# Patient Record
Sex: MALE | Race: WHITE | NOT HISPANIC OR LATINO | Employment: OTHER | URBAN - METROPOLITAN AREA
[De-identification: names, ages, dates, MRNs, and addresses within clinical notes are randomized per-mention and may not be internally consistent; named-entity substitution may affect disease eponyms.]

---

## 2021-11-16 ENCOUNTER — TELEPHONE (OUTPATIENT)
Dept: UROLOGY | Facility: CLINIC | Age: 71
End: 2021-11-16

## 2021-12-01 ENCOUNTER — OFFICE VISIT (OUTPATIENT)
Dept: UROLOGY | Facility: CLINIC | Age: 71
End: 2021-12-01
Payer: COMMERCIAL

## 2021-12-01 VITALS — DIASTOLIC BLOOD PRESSURE: 82 MMHG | HEART RATE: 72 BPM | WEIGHT: 176 LBS | SYSTOLIC BLOOD PRESSURE: 140 MMHG

## 2021-12-01 DIAGNOSIS — R97.20 ELEVATED PSA: Primary | ICD-10-CM

## 2021-12-01 PROCEDURE — 99205 OFFICE O/P NEW HI 60 MIN: CPT | Performed by: UROLOGY

## 2021-12-01 PROCEDURE — G0416 PROSTATE BIOPSY, ANY MTHD: HCPCS | Performed by: PATHOLOGY

## 2021-12-01 PROCEDURE — 55700 PR BIOPSY OF PROSTATE,NEEDLE/PUNCH: CPT | Performed by: UROLOGY

## 2021-12-01 PROCEDURE — 96372 THER/PROPH/DIAG INJ SC/IM: CPT

## 2021-12-01 PROCEDURE — 76942 ECHO GUIDE FOR BIOPSY: CPT | Performed by: UROLOGY

## 2021-12-01 RX ORDER — OMEGA-3S/DHA/EPA/FISH OIL/D3 300MG-1000
400 CAPSULE ORAL DAILY
COMMUNITY

## 2021-12-01 RX ORDER — LISINOPRIL 20 MG/1
20 TABLET ORAL DAILY
COMMUNITY

## 2021-12-01 RX ORDER — CEFTRIAXONE 1 G/1
1000 INJECTION, POWDER, FOR SOLUTION INTRAMUSCULAR; INTRAVENOUS ONCE
Status: COMPLETED | OUTPATIENT
Start: 2021-12-01 | End: 2021-12-01

## 2021-12-01 RX ORDER — PROPRANOLOL/HYDROCHLOROTHIAZID 40 MG-25MG
TABLET ORAL
COMMUNITY

## 2021-12-01 RX ADMIN — CEFTRIAXONE 1000 MG: 1 INJECTION, POWDER, FOR SOLUTION INTRAMUSCULAR; INTRAVENOUS at 14:38

## 2021-12-02 ENCOUNTER — TELEPHONE (OUTPATIENT)
Dept: UROLOGY | Facility: AMBULATORY SURGERY CENTER | Age: 71
End: 2021-12-02

## 2021-12-07 ENCOUNTER — TELEPHONE (OUTPATIENT)
Dept: UROLOGY | Facility: AMBULATORY SURGERY CENTER | Age: 71
End: 2021-12-07

## 2021-12-17 ENCOUNTER — OFFICE VISIT (OUTPATIENT)
Dept: UROLOGY | Facility: CLINIC | Age: 71
End: 2021-12-17
Payer: COMMERCIAL

## 2021-12-17 VITALS
WEIGHT: 176 LBS | BODY MASS INDEX: 26.67 KG/M2 | HEIGHT: 68 IN | DIASTOLIC BLOOD PRESSURE: 74 MMHG | SYSTOLIC BLOOD PRESSURE: 138 MMHG

## 2021-12-17 DIAGNOSIS — C61 PROSTATE CANCER (HCC): Primary | ICD-10-CM

## 2021-12-17 PROCEDURE — 99214 OFFICE O/P EST MOD 30 MIN: CPT | Performed by: UROLOGY

## 2021-12-17 RX ORDER — VITAMIN B COMPLEX
1 CAPSULE ORAL DAILY
COMMUNITY

## 2022-01-12 ENCOUNTER — APPOINTMENT (OUTPATIENT)
Dept: LAB | Facility: CLINIC | Age: 72
End: 2022-01-12
Payer: COMMERCIAL

## 2022-01-12 DIAGNOSIS — C61 PROSTATE CANCER (HCC): ICD-10-CM

## 2022-01-12 LAB
ANION GAP SERPL CALCULATED.3IONS-SCNC: 5 MMOL/L (ref 4–13)
BUN SERPL-MCNC: 20 MG/DL (ref 5–25)
CALCIUM SERPL-MCNC: 10.2 MG/DL (ref 8.3–10.1)
CHLORIDE SERPL-SCNC: 108 MMOL/L (ref 100–108)
CO2 SERPL-SCNC: 28 MMOL/L (ref 21–32)
CREAT SERPL-MCNC: 1.19 MG/DL (ref 0.6–1.3)
GFR SERPL CREATININE-BSD FRML MDRD: 60 ML/MIN/1.73SQ M
GLUCOSE P FAST SERPL-MCNC: 82 MG/DL (ref 65–99)
POTASSIUM SERPL-SCNC: 4.2 MMOL/L (ref 3.5–5.3)
SODIUM SERPL-SCNC: 141 MMOL/L (ref 136–145)

## 2022-01-12 PROCEDURE — 36415 COLL VENOUS BLD VENIPUNCTURE: CPT

## 2022-01-12 PROCEDURE — 80048 BASIC METABOLIC PNL TOTAL CA: CPT

## 2022-01-19 ENCOUNTER — HOSPITAL ENCOUNTER (OUTPATIENT)
Dept: NUCLEAR MEDICINE | Facility: HOSPITAL | Age: 72
Discharge: HOME | End: 2022-01-19
Attending: UROLOGY
Payer: COMMERCIAL

## 2022-01-19 ENCOUNTER — HOSPITAL ENCOUNTER (OUTPATIENT)
Dept: CT IMAGING | Facility: HOSPITAL | Age: 72
Discharge: HOME | End: 2022-01-19
Attending: UROLOGY
Payer: COMMERCIAL

## 2022-01-19 DIAGNOSIS — C61 PROSTATE CANCER (HCC): ICD-10-CM

## 2022-01-19 PROCEDURE — 74177 CT ABD & PELVIS W/CONTRAST: CPT

## 2022-01-19 PROCEDURE — 78306 BONE IMAGING WHOLE BODY: CPT

## 2022-01-19 PROCEDURE — A9503 TC99M MEDRONATE: HCPCS

## 2022-01-19 PROCEDURE — G1004 CDSM NDSC: HCPCS

## 2022-01-19 RX ADMIN — IOHEXOL 100 ML: 350 INJECTION, SOLUTION INTRAVENOUS at 11:25

## 2022-01-21 PROBLEM — C61 PROSTATE CANCER (HCC): Status: ACTIVE | Noted: 2022-01-21

## 2022-01-25 ENCOUNTER — RADIATION ONCOLOGY CONSULT (OUTPATIENT)
Dept: RADIATION ONCOLOGY | Facility: CLINIC | Age: 72
End: 2022-01-25
Attending: INTERNAL MEDICINE
Payer: COMMERCIAL

## 2022-01-25 VITALS
HEIGHT: 68 IN | DIASTOLIC BLOOD PRESSURE: 90 MMHG | WEIGHT: 178.57 LBS | RESPIRATION RATE: 20 BRPM | TEMPERATURE: 97.3 F | SYSTOLIC BLOOD PRESSURE: 146 MMHG | OXYGEN SATURATION: 98 % | BODY MASS INDEX: 27.06 KG/M2 | HEART RATE: 84 BPM

## 2022-01-25 DIAGNOSIS — C61 PROSTATE CANCER (HCC): Primary | ICD-10-CM

## 2022-01-25 PROCEDURE — 99244 OFF/OP CNSLTJ NEW/EST MOD 40: CPT | Performed by: INTERNAL MEDICINE

## 2022-01-25 PROCEDURE — 99211 OFF/OP EST MAY X REQ PHY/QHP: CPT | Performed by: INTERNAL MEDICINE

## 2022-01-25 NOTE — PROGRESS NOTES
Consultation - Radiation Oncology      Patient Name: Keo Low [id number]:[id number] : 1950  Encounter: [id number]  Referring Provider: Beth Cockayne,*    [address] [place] [address]  Chief Complaint   Patient presents with    Consult     Rad Onc      Cancer Staging  Prostate cancer [facility])  Staging form: Prostate, AJCC 8th Edition  - Clinical stage from 2021: Stage IIIC (cT2a, cN0, cM0, PSA: 45 5, Grade Group: 5) - Signed by Radha Pineda MD on 2022  Prostate specific antigen (PSA) range: 20 or greater  Sandhya primary pattern: 4  [place] secondary pattern: 5  Histologic grading system: 5 grade system    History of Present Illness  Keo Low 1950 is a 66 y o  male  presents today to discuss radiation therapy for recently diagnosed Sandhya 9 (4+5) prostate cancer  Referred by Dr Brent Murcia  Past history of HTN, HLD, OA  No family history of prostate cancer      66year old male followed by the [address] [place] [address], presented to urology for elevated PSA and abnormal digital rectal exam      PSA Trend (done at [address] [place] [address])  2012 PSA 2 85  2013 PSA 4 49  2013 PSA 4 79  2014 PSA 6 10  2015 PSA 7 27  3/10/2016 PSA 6 94  10/21/2021 PSA 45 46      21 Urology consult, Dr Brent Murcia  Digital rectal examination reveals a 35 g prostate which is quite firm on the right side and suspicious for prostate cancer  Transrectal ultrasound-guided biopsy of the prostate recommended     21 Transrectal ultrasound-guided biopsy of the prostate  A  Prostate, right lateral base:  - Prostatic adenocarcinoma, [place] score 7, 4+3, Prognostic Grade Group 3, continuously involving 10% of 1 of 1 core  - Periprostatic fat invasion:Not identified  - Lymph-vascular invasion:  not identified  - Perineural invasion: identified  - Additional Pathologic Findings: none identified     B   Prostate, right lateral mid:  - Prostatic adenocarcinoma, Sandhya score 9, 4+5, Prognostic Grade Group 5, continuously involving 100% of 1 of 1 core   - Percentage pattern 4: 70%  - Percentage pattern 5: 5%  - Periprostatic fat invasion:Not identified  - Lymph-vascular invasion:  not identified  - Perineural invasion:Not identified  - Additional Pathologic Findings:  high-grade prostatic intraepithelial neoplasia     C: Prostate, right lateral apex:  - Prostatic adenocarcinoma, [place] score 9, 4+5, Prognostic Grade Group 5, continuously involving 100% of 1 of 1 core  - Percentage pattern 4: 60%  - Percentage pattern 5: 10%  - Periprostatic fat invasion:Not identified  - Lymph-vascular invasion:  not identified  - Perineural invasion: identified  - Additional Pathologic Findings:  high-grade prostatic intraepithelial neoplasia      D  Prostate, right base:  - Prostatic adenocarcinoma, Sandhya score 7, 4+3, Prognostic Grade Group 3, continuously involving 10% of 1 of 2 cores  - Periprostatic fat invasion:Not identified  - Lymph-vascular invasion:  not identified  - Perineural invasion: identified  - Additional Pathologic Findings: high-grade prostatic intraepithelial neoplasia      E  Prostate, right mid:  - Prostatic adenocarcinoma, [place] score 9, 4+5, Prognostic Grade Group 5, continuously involving 100% of 1 of 1 core  - Percentage pattern 4: 60%  - Percentage pattern 5: 10%  - Periprostatic fat invasion:Not identified  - Lymph-vascular invasion:  not identified  - Perineural invasion: identified  - Additional Pathologic Findings:  high-grade prostatic intraepithelial neoplasia       F  Prostate, right apex:  - Prostatic adenocarcinoma, Sandhya score 9, 4+5, Prognostic Grade Group 5, continuously involving 100% of 1 of 1 core  - Percentage pattern 4: 80%  - Percentage pattern 5: 10%  - Periprostatic fat invasion:Not identified  - Lymph-vascular invasion:  not identified  - Perineural invasion: identified  - Additional Pathologic Findings:  high-grade prostatic intraepithelial neoplasia       G   Prostate, left lateral base:  - Prostatic adenocarcinoma, [place]hy[place] score 8, 4+4, Prognostic Grade Group 4, continuously involving 70% of 1 of 1 core  - Periprostatic fat invasion:Not identified  - Lymph-vascular invasion:  not identified  - Perineural invasion:Not identified  - Additional Pathologic Findings:  high-grade prostatic intraepithelial neoplasia       H  Prostate, left lateral mid:  - Prostatic adenocarcinoma, Sandhya score 8, 4+4, Prognostic Grade Group 4, continuously involving 70% of 1 of 1 core  - Periprostatic fat invasion:Not identified  - Lymph-vascular invasion:  not identified  - Perineural invasion:Not identified  - Additional Pathologic Findings:  high-grade prostatic intraepithelial neoplasia        I  Prostate, left lateral apex:  - Prostatic adenocarcinoma, [place] score 8, 4+4, Prognostic Grade Group 4, continuously involving 100% of 1 of 1 core  - Periprostatic fat invasion:Not identified  - Lymph-vascular invasion:  not identified  - Perineural invasion:Not identified  - Additional Pathologic Findings:  high-grade prostatic intraepithelial neoplasia       J  Prostate, left base:  - Prostatic adenocarcinoma, Sandhya score 8, 4+4, Prognostic Grade Group 4, continuously involving 60% of 1 of 1 core  - Periprostatic fat invasion:Not identified  - Lymph-vascular invasion:  not identified  - Perineural invasion:Not identified  - Additional Pathologic Findings:  high-grade prostatic intraepithelial neoplasia       K  Prostate, left mid:  - Prostatic adenocarcinoma, [place] score 8, 4+4, Prognostic Grade Group 4, continuously involving 30% of 1 of 1 core  - Periprostatic fat invasion:Not identified  - Lymph-vascular invasion:  not identified  - Perineural invasion:Not identified  - Additional Pathologic Findings: None identified       L  Prostate, left apex:  - Prostatic adenocarcinoma, Sandhya score 9, 4+5, Prognostic Grade Group 5, continuously involving 50% of 1 of 1 core    - Percentage pattern 4: 70%  - Percentage pattern 5: 10%  - Periprostatic fat invasion:Not identified  - Lymph-vascular invasion:  not identified  - Perineural invasion: identified  - Additional Pathologic Findings:  high-grade prostatic intraepithelial neoplasia         21 Urology, Dr Soni Crowe  Multiple cores revealed [place] 4 + 5 disease with high core positivity  CT scan and bone scan for staging  Refer to radiation oncology pending his staging studies     He will return in follow-up after staging to continue our discussion regarding treatment options      22 CT abdomen pelvis  IMPRESSION:   1   Indeterminate hypodense lesion in the left lobe of the liver   This is not a typical appearance for metastasis but a hepatic neoplasm is not excluded   Recommend follow-up with dedicated MRI of the liver with and without IV contrast   2   5 mm left lower lobe pulmonary nodule   Typical follow up in a patient with history of malignancy is follow up chest CT in 3 months      22 Bone Scan  IMPRESSION:   1   No scintigraphic evidence of osseous metastasis     Upcomin/4/22 Dr Soni Crowe    He reports feeling well overall  He presents with his wife who used to work in healthcare previously  He notes some mild urinary frequency and very mild urination  He has nocturia 1x per night  IPSS Questionnaire (AUA-7): Over the past month     1)  How often have you had a sensation of not emptying your bladder completely after you finish urinating? 0 - Not at all   2)  How often have you had to urinate again less than two hours after you finished urinating? 1 - Less than 1 time in 5   3)  How often have you found you stopped and started again several times when you urinated? 0 - Not at all   4) How difficult have you found it to postpone urination? 1 - Less than 1 time in 5   5) How often have you had a weak urinary stream?  0 - Not at all   6) How often have you had to push or strain to begin urination?   0 - Not at all   7) How many times did you most typically get up to urinate from the time you went to bed until the time you got up in the morning? 1 - 1 time   Total Score:  3     Oncology History   Prostate cancer ([facility] [address] )   12/1/2021 Initial Diagnosis    [facility]     12/1/2021 Biopsy    A  Prostate, right lateral base:  - Prostatic adenocarcinoma, [place]hy[place] score 7, 4+3, Prognostic Grade Group 3, continuously involving 10% of 1 of 1 core  - Periprostatic fat invasion:Not identified  - Lymph-vascular invasion:  not identified  - Perineural invasion: identified  - Additional Pathologic Findings: none identified     B  Prostate, right lateral mid:  - Prostatic adenocarcinoma, [place]hy[place] score 9, 4+5, Prognostic Grade Group 5, continuously involving 100% of 1 of 1 core  - Percentage pattern 4: 70%  - Percentage pattern 5: 5%  - Periprostatic fat invasion:Not identified  - Lymph-vascular invasion:  not identified  - Perineural invasion:Not identified  - Additional Pathologic Findings:  high-grade prostatic intraepithelial neoplasia     C: Prostate, right lateral apex:  - Prostatic adenocarcinoma, [place]hy[place] score 9, 4+5, Prognostic Grade Group 5, continuously involving 100% of 1 of 1 core  - Percentage pattern 4: 60%  - Percentage pattern 5: 10%  - Periprostatic fat invasion:Not identified  - Lymph-vascular invasion:  not identified  - Perineural invasion: identified  - Additional Pathologic Findings:  high-grade prostatic intraepithelial neoplasia      D  Prostate, right base:  - Prostatic adenocarcinoma, [place] score 7, 4+3, Prognostic Grade Group 3, continuously involving 10% of 1 of 2 cores  - Periprostatic fat invasion:Not identified  - Lymph-vascular invasion:  not identified  - Perineural invasion: identified  - Additional Pathologic Findings: high-grade prostatic intraepithelial neoplasia      E  Prostate, right mid:  - Prostatic adenocarcinoma, [place] score 9, 4+5, Prognostic Grade Group 5, continuously involving 100% of 1 of 1 core    - Percentage pattern 4: 60%  - Percentage pattern 5: 10%  - Periprostatic fat invasion:Not identified  - Lymph-vascular invasion:  not identified  - Perineural invasion: identified  - Additional Pathologic Findings:  high-grade prostatic intraepithelial neoplasia       F  Prostate, right apex:  - Prostatic adenocarcinoma, [place] score 9, 4+5, Prognostic Grade Group 5, continuously involving 100% of 1 of 1 core  - Percentage pattern 4: 80%  - Percentage pattern 5: 10%  - Periprostatic fat invasion:Not identified  - Lymph-vascular invasion:  not identified  - Perineural invasion: identified  - Additional Pathologic Findings:  high-grade prostatic intraepithelial neoplasia       G  Prostate, left lateral base:  - Prostatic adenocarcinoma, [place] score 8, 4+4, Prognostic Grade Group 4, continuously involving 70% of 1 of 1 core  - Periprostatic fat invasion:Not identified  - Lymph-vascular invasion:  not identified  - Perineural invasion:Not identified  - Additional Pathologic Findings:  high-grade prostatic intraepithelial neoplasia       H  Prostate, left lateral mid:  - Prostatic adenocarcinoma, Sandhy[place] score 8, 4+4, Prognostic Grade Group 4, continuously involving 70% of 1 of 1 core  - Periprostatic fat invasion:Not identified  - Lymph-vascular invasion:  not identified  - Perineural invasion:Not identified  - Additional Pathologic Findings:  high-grade prostatic intraepithelial neoplasia        I  Prostate, left lateral apex:  - Prostatic adenocarcinoma, [place] score 8, 4+4, Prognostic Grade Group 4, continuously involving 100% of 1 of 1 core  - Periprostatic fat invasion:Not identified  - Lymph-vascular invasion:  not identified  - Perineural invasion:Not identified  - Additional Pathologic Findings:  high-grade prostatic intraepithelial neoplasia       J  Prostate, left base:  - Prostatic adenocarcinoma, [place]hy[place] score 8, 4+4, Prognostic Grade Group 4, continuously involving 60% of 1 of 1 core    - Periprostatic fat invasion:Not identified  - Lymph-vascular invasion:  not identified  - Perineural invasion:Not identified  - Additional Pathologic Findings:  high-grade prostatic intraepithelial neoplasia       K  Prostate, left mid:  - Prostatic adenocarcinoma, [place] score 8, 4+4, Prognostic Grade Group 4, continuously involving 30% of 1 of 1 core  - Periprostatic fat invasion:Not identified  - Lymph-vascular invasion:  not identified  - Perineural invasion:Not identified  - Additional Pathologic Findings: None identified       L  Prostate, left apex:  - Prostatic adenocarcinoma, [place]hy[place] score 9, 4+5, Prognostic Grade Group 5, continuously involving 50% of 1 of 1 core  - Percentage pattern 4: 70%  - Percentage pattern 5: 10%  - Periprostatic fat invasion:Not identified  - Lymph-vascular invasion:  not identified  - Perineural invasion: identified  - Additional Pathologic Findings:  high-grade prostatic intraepithelial neoplasia        12/1/2021 -  Cancer Staged    Staging form: Prostate, AJCC 8th Edition  - Clinical stage from 12/1/2021: Stage IIIC (cT2a, cN0, cM0, PSA: 45 5, Grade Group: 5) - Signed by Bety Cabrera MD on 1/25/2022  Prostate specific antigen (PSA) range: 20 or greater  [place] primary pattern: 4  Sandhya secondary pattern: 5  Histologic grading system: 5 grade system         Historical Information   Past Medical History:   Diagnosis Date    Hypertension     Prostate cancer [facility])      Past Surgical History:   Procedure Laterality Date    PROSTATE BIOPSY       History reviewed  No pertinent family history  Social History   Social History     Substance and Sexual Activity   Alcohol Use Yes    Comment: Beer and liquor daily-average 2-3 drinks/day        Social History     Substance and Sexual Activity   Drug Use Yes    Types: Marijuana    Comment: Nightly use--since age 25 yo      Social History     Tobacco Use   Smoking Status Former Smoker   Smokeless Tobacco Never Used   Tobacco Comment    Quit smoking approx 7-8 yrs ago/Smoked 1/2 pk daily w/smoking      Meds/Allergies     Current Outpatient Medications:     b complex vitamins capsule, Take 1 capsule by mouth daily, Disp: , Rfl:     cholecalciferol (VITAMIN D3) 400 units tablet, Take 400 Units by mouth daily, Disp: , Rfl:     COD LIVER OIL PO, Take by mouth, Disp: , Rfl:     lisinopril (ZESTRIL) 20 mg tablet, Take 20 mg by mouth daily, Disp: , Rfl:     Turmeric (QC Tumeric Complex) 500 MG CAPS, Take by mouth, Disp: , Rfl:   No Known Allergies    Lab Results/Imaging Studies       Chemistry        Component Value Date/Time    K 4 2 01/12/2022 1115     01/12/2022 1115    CO2 28 01/12/2022 1115    BUN 20 01/12/2022 1115    CREATININE 1 19 01/12/2022 1115        Component Value Date/Time    CALCIUM 10 2 (H) 01/12/2022 1115          No results found for: WBC, HGB, HCT, MCV, PLT  Imaging Studies  NM bone scan whole body    Result Date: 1/19/2022  Narrative: BONE SCAN  WHOLE BODY INDICATION: C61: Malignant neoplasm of prostate PREVIOUS FILM CORRELATION:    CT abdomen pelvis 1/19/2022 TECHNIQUE:   This study was performed following the intravenous administration of 26 7 mCi Tc-99m labeled MDP  Delayed, anterior and posterior whole body images were acquired, 2-3 hours after radiopharmaceutical administration  Additional delayed oblique static images acquired of the bilateral ribs and pelvis  FINDINGS:  Mild focal radiotracer uptake at the left maxilla inferiorly may be related to dental disease  Foci of radiotracer uptake at the bilateral shoulders, manubrioclavicular joints, hips (more prominently on the right) and left knee likely related to arthritic changes given the distribution  There is no scintigraphic evidence of osseous metastasis  The renal activity is symmetric  Impression: 1  No scintigraphic evidence of osseous metastasis   Workstation performed: [id number]     CT abdomen pelvis w contrast    Result Date: 1/19/2022  Narrative: CT ABDOMEN AND PELVIS WITH IV CONTRAST INDICATION:   C61: Malignant neoplasm of prostate  COMPARISON:  Bone scan 1/19/2022 TECHNIQUE:  CT examination of the abdomen and pelvis was performed  Axial, sagittal, and coronal 2D reformatted images were created from the source data and submitted for interpretation  Radiation dose length product (DLP) for this visit:  512 mGy-cm   This examination, like all CT scans performed in the [address] [place] [address], was performed utilizing techniques to minimize radiation dose exposure, including the use of iterative reconstruction and automated exposure control  IV Contrast:  100 mL of iohexol (OMNIPAQUE) Enteric Contrast:  Enteric contrast was not administered  FINDINGS: ABDOMEN LOWER CHEST:  5 mm left lower lobe nodule posteriorly image 12 series 2  LIVER/BILIARY TREE:  Low attenuation liver suggests fatty infiltration  Indeterminate hypodense lesion in the left lobe of the liver close to the falciform ligament measuring 2 2 x 1 2 cm  There are a few small peripheral calcifications  Although a common location for focal fatty infiltration, this does appear more masslike particularly on coronal reformats  No biliary ductal dilatation  GALLBLADDER:  No calcified gallstones  No pericholecystic inflammatory change  SPLEEN:  Unremarkable  PANCREAS:  Unremarkable  ADRENAL GLANDS:  There is low density lobulated thickening of adrenal glands bilaterally statistically most likely to represent adenomatous hyperplasia  KIDNEYS/URETERS:  One or more small simple renal cyst(s) is noted  Additional subcentimeter hypodensities in the kidneys, too small to characterize but statistically likely small cysts  No hydronephrosis  STOMACH AND BOWEL:  Limited evaluation without enteric contrast   There is colonic diverticulosis without evidence of acute diverticulitis  APPENDIX:  There are expected postoperative changes of appendectomy  ABDOMINOPELVIC CAVITY:  No ascites  No pneumoperitoneum  No lymphadenopathy  VESSELS:  Unremarkable for patient's age  PELVIS REPRODUCTIVE ORGANS:  Mildly prominent prostate, heterogeneous in appearance without obvious mass  URINARY BLADDER:  Underdistended limiting evaluation  ABDOMINAL WALL/INGUINAL REGIONS:  Tiny fat-containing left inguinal hernia suggested  OSSEOUS STRUCTURES:  No acute fracture or destructive osseous lesion  Severe degenerative arthritic changes of the right hip  Scattered degenerative spurring along the visualized spine  Impression: 1  Indeterminate hypodense lesion in the left lobe of the liver  This is not a typical appearance for metastasis but a hepatic neoplasm is not excluded  Recommend follow-up with dedicated MRI of the liver with and without IV contrast  2   5 mm left lower lobe pulmonary nodule  Typical follow up in a patient with history of malignancy is follow up chest CT in 3 months  The study was marked in EPIC for significant notification  Workstation performed: [id number]        Review of Systems  Constitutional: Negative  HENT: Positive for dental problem (Regular dental care  )  Bilateral hearing aids/not wearing them at present time  Eyes:        Wears glasses      Respiratory: Negative  Cardiovascular: Negative  Gastrointestinal: Negative  Endocrine: Negative  Genitourinary:        Stream is fair   No dysuria/no hematuria   Nocturia x 1       Musculoskeletal: Positive for arthralgias (Arthritis left leg)  Skin: Negative  Allergic/Immunologic: Positive for environmental allergies (Poison ivy)  Neurological: Negative  Hematological: Negative  Psychiatric/Behavioral: Positive for sleep disturbance (Insomnia )       OBJECTIVE:   /90   Pulse 84   Temp (!) 97 3 °F (36 3 °C)   Resp 20   Ht 5' 8" (1 727 m)   Wt 81 kg (178 lb 9 2 oz)   SpO2 98%   BMI 27 15 kg/m²   Pain Assessment:  0  Performance Status: ECO - Symptomatic but completely ambulatory    Physical Exam  General Appearance:  Alert, cooperative, no distress, appears stated age  [de-identified]: normocephalic/atraumatic, neck supple  Cardiovascular:  Extremities warm and well perfused, no lower extremity edema  Lungs: Respirations unlabored, no cyanosis, able to speak in complete sentences without dyspnea  Abdomen: Soft, non-distended  Extremities: No cyanosis or edema, no joint swelling  TAZ: Deferred  Skin: No rash or dermatitis  Neurologic: AAOx3, CNII-XII grossly intact except bilateral hearing loss    Pathology:  See above  ASSESSMENT  1  Prostate cancer [facility])  Ambulatory referral to Radiation Oncology    MRI abdomen w wo contrast       Cancer Staging  Prostate cancer [facility])  Staging form: Prostate, AJCC 8th Edition  - Clinical stage from 12/1/2021: Stage IIIC (cT2a, cN0, cM0, PSA: 45 5, Grade Group: 5) - Signed by Mavis Brooke MD on 1/25/2022  Prostate specific antigen (PSA) range: 20 or greater  [place] primary pattern: 4  Sandhya secondary pattern: 5  Histologic grading system: 5 grade system    PLAN  Twin Austin is a 66 y o  male with 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 (rP2jT1T6, [place] 4+5, PSA 45 46)  Negative bone scan  CT A/P showed no evidence of metastases in the abdomen pelvis  There was an indeterminate hypodense lesion in the left liver for which an MRI was recommended  There was also a 5mm LLL nodule recommended f/u CT chest in 3 months  As discussed previously with the patient, options include radical prostatectomy vs definitive radiation therapy with 2 years of ADT  We discussed conventionally-fractionated external beam radiation therapy to a total dose of 79 2Gy with 45Gy to the pelvic nodes   We reviewed side effects of external beam radiation therapy including but not limited to skin reaction, lethargy, dysuria, frequency, hematuria, urinary retention possibly requiring catheterization, rectal irritation, permanent rectal ulceration, fistulization, small bowel obstruction, diarrhea, permanent impotence, permanent urinary incontinence, azoospermia, decreased ejaculate, and failure of treatment  At this time, Mr  Via Partenope 66 and his wife are leaning towards surgery  They will continue to consider their options  He is scheduled to see   [facility] on 2/4/22 to finalize his decision  I advised him to notify our department of his treatment decision  They seemed anxious about the liver finding so the MRI Liver was ordered and scheduled for 2/8/22  Thank you for the opportunity to participate in the care of this patient  Kwasi Ren MD  Department of [address] [place] [address] 12    Orders Placed This Encounter   Procedures    MRI abdomen w wo contrast      Portions of the record may have been created with voice recognition software  Occasional wrong word or "sound a like" substitutions may have occurred due to the inherent limitations of voice recognition software  Read the chart carefully and recognize, using context, where substitutions have occurred

## 2022-01-25 NOTE — PROGRESS NOTES
Boogie Perry 1950 is a 66 y o  male  presents today to discuss radiation therapy for recently diagnosed [place] 9 (4+5) prostate cancer  Referred by Dr Peri Buenrostro  Past history of HTN, HLD, OA  No family history of prostate cancer  66year old male followed by the [place], presented to urology for elevated PSA and abnormal digital rectal exam     PSA Trend (done at [place])  2/21/2012 PSA 2 85  4/19/2013 PSA 4 49  11/29/2013 PSA 4 79  12/24/2014 PSA 6 10  8/4/2015 PSA 7 27  3/10/2016 PSA 6 94  10/21/2021 PSA 45 46       12/1/21 Urology consult, Dr Peri Buenrostro  Digital rectal examination reveals a 35 g prostate which is quite firm on the right side and suspicious for prostate cancer  Transrectal ultrasound-guided biopsy of the prostate recommended    12/1/21 Transrectal ultrasound-guided biopsy of the prostate  A  Prostate, right lateral base:  - Prostatic adenocarcinoma, [place]hy[place] score 7, 4+3, Prognostic Grade Group 3, continuously involving 10% of 1 of 1 core  - Periprostatic fat invasion:Not identified  - Lymph-vascular invasion:  not identified  - Perineural invasion: identified  - Additional Pathologic Findings: none identified     B  Prostate, right lateral mid:  - Prostatic adenocarcinoma, Sandhya score 9, 4+5, Prognostic Grade Group 5, continuously involving 100% of 1 of 1 core  - Percentage pattern 4: 70%  - Percentage pattern 5: 5%  - Periprostatic fat invasion:Not identified  - Lymph-vascular invasion:  not identified  - Perineural invasion:Not identified  - Additional Pathologic Findings:  high-grade prostatic intraepithelial neoplasia     C: Prostate, right lateral apex:  - Prostatic adenocarcinoma, Sandhya score 9, 4+5, Prognostic Grade Group 5, continuously involving 100% of 1 of 1 core    - Percentage pattern 4: 60%  - Percentage pattern 5: 10%  - Periprostatic fat invasion:Not identified  - Lymph-vascular invasion:  not identified  - Perineural invasion: identified  - Additional Pathologic Findings: high-grade prostatic intraepithelial neoplasia      D  Prostate, right base:  - Prostatic adenocarcinoma, [place] score 7, 4+3, Prognostic Grade Group 3, continuously involving 10% of 1 of 2 cores  - Periprostatic fat invasion:Not identified  - Lymph-vascular invasion:  not identified  - Perineural invasion: identified  - Additional Pathologic Findings: high-grade prostatic intraepithelial neoplasia      E  Prostate, right mid:  - Prostatic adenocarcinoma, Sandhy[place] score 9, 4+5, Prognostic Grade Group 5, continuously involving 100% of 1 of 1 core  - Percentage pattern 4: 60%  - Percentage pattern 5: 10%  - Periprostatic fat invasion:Not identified  - Lymph-vascular invasion:  not identified  - Perineural invasion: identified  - Additional Pathologic Findings:  high-grade prostatic intraepithelial neoplasia       F  Prostate, right apex:  - Prostatic adenocarcinoma, [place] score 9, 4+5, Prognostic Grade Group 5, continuously involving 100% of 1 of 1 core  - Percentage pattern 4: 80%  - Percentage pattern 5: 10%  - Periprostatic fat invasion:Not identified  - Lymph-vascular invasion:  not identified  - Perineural invasion: identified  - Additional Pathologic Findings:  high-grade prostatic intraepithelial neoplasia       G  Prostate, left lateral base:  - Prostatic adenocarcinoma, [place] score 8, 4+4, Prognostic Grade Group 4, continuously involving 70% of 1 of 1 core  - Periprostatic fat invasion:Not identified  - Lymph-vascular invasion:  not identified  - Perineural invasion:Not identified  - Additional Pathologic Findings:  high-grade prostatic intraepithelial neoplasia       H  Prostate, left lateral mid:  - Prostatic adenocarcinoma, [place] score 8, 4+4, Prognostic Grade Group 4, continuously involving 70% of 1 of 1 core    - Periprostatic fat invasion:Not identified  - Lymph-vascular invasion:  not identified  - Perineural invasion:Not identified  - Additional Pathologic Findings:  high-grade prostatic intraepithelial neoplasia        I  Prostate, left lateral apex:  - Prostatic adenocarcinoma, Sandhya score 8, 4+4, Prognostic Grade Group 4, continuously involving 100% of 1 of 1 core  - Periprostatic fat invasion:Not identified  - Lymph-vascular invasion:  not identified  - Perineural invasion:Not identified  - Additional Pathologic Findings:  high-grade prostatic intraepithelial neoplasia       J  Prostate, left base:  - Prostatic adenocarcinoma, Sandhya score 8, 4+4, Prognostic Grade Group 4, continuously involving 60% of 1 of 1 core  - Periprostatic fat invasion:Not identified  - Lymph-vascular invasion:  not identified  - Perineural invasion:Not identified  - Additional Pathologic Findings:  high-grade prostatic intraepithelial neoplasia       K  Prostate, left mid:  - Prostatic adenocarcinoma, [place] score 8, 4+4, Prognostic Grade Group 4, continuously involving 30% of 1 of 1 core  - Periprostatic fat invasion:Not identified  - Lymph-vascular invasion:  not identified  - Perineural invasion:Not identified  - Additional Pathologic Findings: None identified       L  Prostate, left apex:  - Prostatic adenocarcinoma, [place] score 9, 4+5, Prognostic Grade Group 5, continuously involving 50% of 1 of 1 core  - Percentage pattern 4: 70%  - Percentage pattern 5: 10%  - Periprostatic fat invasion:Not identified  - Lymph-vascular invasion:  not identified  - Perineural invasion: identified  - Additional Pathologic Findings:  high-grade prostatic intraepithelial neoplasia           12/7/21 Urology, Dr Soni Crowe  Multiple cores revealed [place] 4 + 5 disease with high core positivity  CT scan and bone scan for staging  Refer to radiation oncology pending his staging studies  He will return in follow-up after staging to continue our discussion regarding treatment options  1/19/22 CT abdomen pelvis  IMPRESSION:   1  Indeterminate hypodense lesion in the left lobe of the liver    This is not a typical appearance for metastasis but a hepatic neoplasm is not excluded  Recommend follow-up with dedicated MRI of the liver with and without IV contrast   2   5 mm left lower lobe pulmonary nodule  Typical follow up in a patient with history of malignancy is follow up chest CT in 3 months  22 Bone Scan  IMPRESSION:   1  No scintigraphic evidence of osseous metastasis      Upcomin/4/22 Dr Freddy Gamez          Oncology History   Prostate cancer [facility])   2021 Initial Diagnosis    Prostate cancer ([facility] [address] )     2021 Biopsy    A  Prostate, right lateral base:  - Prostatic adenocarcinoma, Sandhya score 7, 4+3, Prognostic Grade Group 3, continuously involving 10% of 1 of 1 core  - Periprostatic fat invasion:Not identified  - Lymph-vascular invasion:  not identified  - Perineural invasion: identified  - Additional Pathologic Findings: none identified     B  Prostate, right lateral mid:  - Prostatic adenocarcinoma, Sandhya score 9, 4+5, Prognostic Grade Group 5, continuously involving 100% of 1 of 1 core  - Percentage pattern 4: 70%  - Percentage pattern 5: 5%  - Periprostatic fat invasion:Not identified  - Lymph-vascular invasion:  not identified  - Perineural invasion:Not identified  - Additional Pathologic Findings:  high-grade prostatic intraepithelial neoplasia     C: Prostate, right lateral apex:  - Prostatic adenocarcinoma, [place] score 9, 4+5, Prognostic Grade Group 5, continuously involving 100% of 1 of 1 core  - Percentage pattern 4: 60%  - Percentage pattern 5: 10%  - Periprostatic fat invasion:Not identified  - Lymph-vascular invasion:  not identified  - Perineural invasion: identified  - Additional Pathologic Findings:  high-grade prostatic intraepithelial neoplasia      D  Prostate, right base:  - Prostatic adenocarcinoma, Sandhya score 7, 4+3, Prognostic Grade Group 3, continuously involving 10% of 1 of 2 cores    - Periprostatic fat invasion:Not identified  - Lymph-vascular invasion:  not identified  - Perineural invasion: identified  - Additional Pathologic Findings: high-grade prostatic intraepithelial neoplasia      E  Prostate, right mid:  - Prostatic adenocarcinoma, [place] score 9, 4+5, Prognostic Grade Group 5, continuously involving 100% of 1 of 1 core  - Percentage pattern 4: 60%  - Percentage pattern 5: 10%  - Periprostatic fat invasion:Not identified  - Lymph-vascular invasion:  not identified  - Perineural invasion: identified  - Additional Pathologic Findings:  high-grade prostatic intraepithelial neoplasia       F  Prostate, right apex:  - Prostatic adenocarcinoma, [place] score 9, 4+5, Prognostic Grade Group 5, continuously involving 100% of 1 of 1 core  - Percentage pattern 4: 80%  - Percentage pattern 5: 10%  - Periprostatic fat invasion:Not identified  - Lymph-vascular invasion:  not identified  - Perineural invasion: identified  - Additional Pathologic Findings:  high-grade prostatic intraepithelial neoplasia       G  Prostate, left lateral base:  - Prostatic adenocarcinoma, [place] score 8, 4+4, Prognostic Grade Group 4, continuously involving 70% of 1 of 1 core  - Periprostatic fat invasion:Not identified  - Lymph-vascular invasion:  not identified  - Perineural invasion:Not identified  - Additional Pathologic Findings:  high-grade prostatic intraepithelial neoplasia       H  Prostate, left lateral mid:  - Prostatic adenocarcinoma, [place] score 8, 4+4, Prognostic Grade Group 4, continuously involving 70% of 1 of 1 core  - Periprostatic fat invasion:Not identified  - Lymph-vascular invasion:  not identified  - Perineural invasion:Not identified  - Additional Pathologic Findings:  high-grade prostatic intraepithelial neoplasia        I  Prostate, left lateral apex:  - Prostatic adenocarcinoma, [place] score 8, 4+4, Prognostic Grade Group 4, continuously involving 100% of 1 of 1 core    - Periprostatic fat invasion:Not identified  - Lymph-vascular invasion:  not identified  - Perineural invasion:Not identified  - Additional Pathologic Findings:  high-grade prostatic intraepithelial neoplasia       J  Prostate, left base:  - Prostatic adenocarcinoma, [place] score 8, 4+4, Prognostic Grade Group 4, continuously involving 60% of 1 of 1 core  - Periprostatic fat invasion:Not identified  - Lymph-vascular invasion:  not identified  - Perineural invasion:Not identified  - Additional Pathologic Findings:  high-grade prostatic intraepithelial neoplasia       K  Prostate, left mid:  - Prostatic adenocarcinoma, [place] score 8, 4+4, Prognostic Grade Group 4, continuously involving 30% of 1 of 1 core  - Periprostatic fat invasion:Not identified  - Lymph-vascular invasion:  not identified  - Perineural invasion:Not identified  - Additional Pathologic Findings: None identified       L  Prostate, left apex:  - Prostatic adenocarcinoma, Sandhy[place] score 9, 4+5, Prognostic Grade Group 5, continuously involving 50% of 1 of 1 core  - Percentage pattern 4: 70%  - Percentage pattern 5: 10%  - Periprostatic fat invasion:Not identified  - Lymph-vascular invasion:  not identified  - Perineural invasion: identified  - Additional Pathologic Findings:  high-grade prostatic intraepithelial neoplasia            Review of Systems:  Review of Systems   Constitutional: Negative  HENT: Positive for dental problem (Regular dental care  )  Bilateral hearing aids/not wearing them at present time  Eyes:        Wears glasses      Respiratory: Negative  Cardiovascular: Negative  Gastrointestinal: Negative  Endocrine: Negative  Genitourinary:        Stream is fair   No dysuria/no hematuria   Nocturia x 1       Musculoskeletal: Positive for arthralgias (Arthritis left leg)  Skin: Negative  Allergic/Immunologic: Positive for environmental allergies (Poison ivy)  Neurological: Negative  Hematological: Negative  Psychiatric/Behavioral: Positive for sleep disturbance (Insomnia )         Clinical Trial: no    IPSS Questionnaire (AUA-7): Over the past month    1)  How often have you had a sensation of not emptying your bladder completely after you finish urinating? 0 - Not at all   2)  How often have you had to urinate again less than two hours after you finished urinating? 1 - Less than 1 time in 5   3)  How often have you found you stopped and started again several times when you urinated? 0 - Not at all   4) How difficult have you found it to postpone urination? 1 - Less than 1 time in 5   5) How often have you had a weak urinary stream?  0 - Not at all   6) How often have you had to push or strain to begin urination? 0 - Not at all   7) How many times did you most typically get up to urinate from the time you went to bed until the time you got up in the morning? 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     MST      Implantable Devices [facility], pacemaker, pain stimulator)   No     Hip Replacement  No     Covid Vaccine Status   Vaccinated and Boosted  Health Maintenance   Topic Date Due    Hepatitis C Screening  Never done    BMI: Followup Plan  Never done    Annual Physical  Never done    DTaP,Tdap,and Td Vaccines (1 - Tdap) Never done    Colorectal Cancer Screening  Never done    Fall Risk  Never done    Pneumococcal Vaccine: 65+ Years (1 of 1 - PPSV23) Never done    Influenza Vaccine (1) 09/01/2021    Depression Screening  01/25/2023    BMI: Adult  01/25/2023    COVID-19 Vaccine  Completed    HIB Vaccine  Aged Out    Hepatitis B Vaccine  Aged Out    IPV Vaccine  Aged Out    Hepatitis A Vaccine  Aged Out    Meningococcal ACWY Vaccine  Aged Out    HPV Vaccine  Aged Out       Past Medical History:   Diagnosis Date    Hypertension     Prostate cancer ([facility] [address] )        Past Surgical History:   Procedure Laterality Date    PROSTATE BIOPSY         History reviewed  No pertinent family history  Social History     Tobacco Use    Smoking status: Former Smoker    Smokeless tobacco: Never Used    Tobacco comment: Quit smoking approx 7-8 yrs ago/Smoked 1/2 pk daily w/smoking    Vaping Use    Vaping Use: Never used   Substance Use Topics    Alcohol use: Yes     Comment: Beer and liquor daily-average 2-3 drinks/day       Drug use: Yes     Types: Marijuana     Comment: Nightly use--since age 25 yo           Current Outpatient Medications:     b complex vitamins capsule, Take 1 capsule by mouth daily, Disp: , Rfl:     cholecalciferol (VITAMIN D3) 400 units tablet, Take 400 Units by mouth daily, Disp: , Rfl:     COD LIVER OIL PO, Take by mouth, Disp: , Rfl:     lisinopril (ZESTRIL) 20 mg tablet, Take 20 mg by mouth daily, Disp: , Rfl:     Turmeric (QC Tumeric Complex) 500 MG CAPS, Take by mouth, Disp: , Rfl:     No Known Allergies     Vitals:    01/25/22 1244   BP: 146/90   Pulse: 84   Resp: 20   Temp: (!) 97 3 °F (36 3 °C)   SpO2: 98%   Weight: 81 kg (178 lb 9 2 oz)   Height: 5' 8" (1 727 m)       Pain Score: 0-No pain

## 2022-01-25 NOTE — LETTER
2022     Andria Mcdonnell [address]  [address] [address]    Patient: Gerhardt Blight   YOB: 1950   Date of Visit: 2022       Dear Dr Yancy Casiano:    Thank you for referring Gerhardt Blight to me for evaluation  Below are my notes for this consultation  If you have questions, please do not hesitate to call me  I look forward to following your patient along with you  Sincerely,        Ana Vick MD        CC: No Recipients  Ana iVck MD  2022  2:28 PM  Sign when Signing Visit  Consultation - Radiation Oncology      Patient Name: Gerhardt Blight DEQ:[id number] : 1950  Encounter: [id number]  Referring Provider: Damion Donnelly    [address] [place] [address]  Chief Complaint   Patient presents with    Consult     Rad Onc      Cancer Staging  Prostate cancer [facility])  Staging form: Prostate, AJCC 8th Edition  - Clinical stage from 2021: Stage IIIC (cT2a, cN0, cM0, PSA: 45 5, Grade Group: 5) - Signed by Ana Vick MD on 2022  Prostate specific antigen (PSA) range: 20 or greater  [place] primary pattern: 4  [place] secondary pattern: 5  Histologic grading system: 5 grade system    History of Present Illness  Gerhardt Blight 1950 is a 66 y o  male  presents today to discuss radiation therapy for recently diagnosed [place] 9 (4+5) prostate cancer  Referred by Dr Yancy Casiano  Past history of HTN, HLD, OA  No family history of prostate cancer      66year old male followed by the [facility], presented to urology for elevated PSA and abnormal digital rectal exam      PSA Trend (done at [facility])  2012 PSA 2 85  2013 PSA 4 49  2013 PSA 4 79  2014 PSA 6 10  2015 PSA 7 27  3/10/2016 PSA 6 94  10/21/2021 PSA 45 46      21 Urology consult, Dr Yancy Casiano  Digital rectal examination reveals a 35 g prostate which is quite firm on the right side and suspicious for prostate cancer    Transrectal ultrasound-guided biopsy of the prostate recommended     12/1/21 Transrectal ultrasound-guided biopsy of the prostate  A  Prostate, right lateral base:  - Prostatic adenocarcinoma, Sandhya score 7, 4+3, Prognostic Grade Group 3, continuously involving 10% of 1 of 1 core  - Periprostatic fat invasion:Not identified  - Lymph-vascular invasion:  not identified  - Perineural invasion: identified  - Additional Pathologic Findings: none identified     B  Prostate, right lateral mid:  - Prostatic adenocarcinoma, Sandhya score 9, 4+5, Prognostic Grade Group 5, continuously involving 100% of 1 of 1 core  - Percentage pattern 4: 70%  - Percentage pattern 5: 5%  - Periprostatic fat invasion:Not identified  - Lymph-vascular invasion:  not identified  - Perineural invasion:Not identified  - Additional Pathologic Findings:  high-grade prostatic intraepithelial neoplasia     C: Prostate, right lateral apex:  - Prostatic adenocarcinoma, Sandhya score 9, 4+5, Prognostic Grade Group 5, continuously involving 100% of 1 of 1 core  - Percentage pattern 4: 60%  - Percentage pattern 5: 10%  - Periprostatic fat invasion:Not identified  - Lymph-vascular invasion:  not identified  - Perineural invasion: identified  - Additional Pathologic Findings:  high-grade prostatic intraepithelial neoplasia      D  Prostate, right base:  - Prostatic adenocarcinoma, Sandhya score 7, 4+3, Prognostic Grade Group 3, continuously involving 10% of 1 of 2 cores  - Periprostatic fat invasion:Not identified  - Lymph-vascular invasion:  not identified  - Perineural invasion: identified  - Additional Pathologic Findings: high-grade prostatic intraepithelial neoplasia      E  Prostate, right mid:  - Prostatic adenocarcinoma, Sandhya score 9, 4+5, Prognostic Grade Group 5, continuously involving 100% of 1 of 1 core    - Percentage pattern 4: 60%  - Percentage pattern 5: 10%  - Periprostatic fat invasion:Not identified  - Lymph-vascular invasion:  not identified  - Perineural invasion: identified  - Additional Pathologic Findings:  high-grade prostatic intraepithelial neoplasia       F  Prostate, right apex:  - Prostatic adenocarcinoma, Sandhya score 9, 4+5, Prognostic Grade Group 5, continuously involving 100% of 1 of 1 core  - Percentage pattern 4: 80%  - Percentage pattern 5: 10%  - Periprostatic fat invasion:Not identified  - Lymph-vascular invasion:  not identified  - Perineural invasion: identified  - Additional Pathologic Findings:  high-grade prostatic intraepithelial neoplasia       G  Prostate, left lateral base:  - Prostatic adenocarcinoma, Sandhya score 8, 4+4, Prognostic Grade Group 4, continuously involving 70% of 1 of 1 core  - Periprostatic fat invasion:Not identified  - Lymph-vascular invasion:  not identified  - Perineural invasion:Not identified  - Additional Pathologic Findings:  high-grade prostatic intraepithelial neoplasia       H  Prostate, left lateral mid:  - Prostatic adenocarcinoma, Sandhya score 8, 4+4, Prognostic Grade Group 4, continuously involving 70% of 1 of 1 core  - Periprostatic fat invasion:Not identified  - Lymph-vascular invasion:  not identified  - Perineural invasion:Not identified  - Additional Pathologic Findings:  high-grade prostatic intraepithelial neoplasia        I  Prostate, left lateral apex:  - Prostatic adenocarcinoma, [place] score 8, 4+4, Prognostic Grade Group 4, continuously involving 100% of 1 of 1 core  - Periprostatic fat invasion:Not identified  - Lymph-vascular invasion:  not identified  - Perineural invasion:Not identified  - Additional Pathologic Findings:  high-grade prostatic intraepithelial neoplasia       J  Prostate, left base:  - Prostatic adenocarcinoma, Sandhya score 8, 4+4, Prognostic Grade Group 4, continuously involving 60% of 1 of 1 core    - Periprostatic fat invasion:Not identified  - Lymph-vascular invasion:  not identified  - Perineural invasion:Not identified  - Additional Pathologic Findings:  high-grade prostatic intraepithelial neoplasia       K  Prostate, left mid:  - Prostatic adenocarcinoma, Sandhya score 8, 4+4, Prognostic Grade Group 4, continuously involving 30% of 1 of 1 core  - Periprostatic fat invasion:Not identified  - Lymph-vascular invasion:  not identified  - Perineural invasion:Not identified  - Additional Pathologic Findings: None identified       L  Prostate, left apex:  - Prostatic adenocarcinoma, Sandhya score 9, 4+5, Prognostic Grade Group 5, continuously involving 50% of 1 of 1 core  - Percentage pattern 4: 70%  - Percentage pattern 5: 10%  - Periprostatic fat invasion:Not identified  - Lymph-vascular invasion:  not identified  - Perineural invasion: identified  - Additional Pathologic Findings:  high-grade prostatic intraepithelial neoplasia         21 Urology, Dr Jada Grier  Multiple cores revealed [place] 4 + 5 disease with high core positivity  CT scan and bone scan for staging  Refer to radiation oncology pending his staging studies     He will return in follow-up after staging to continue our discussion regarding treatment options      22 CT abdomen pelvis  IMPRESSION:   1   Indeterminate hypodense lesion in the left lobe of the liver   This is not a typical appearance for metastasis but a hepatic neoplasm is not excluded   Recommend follow-up with dedicated MRI of the liver with and without IV contrast   2   5 mm left lower lobe pulmonary nodule   Typical follow up in a patient with history of malignancy is follow up chest CT in 3 months      22 Bone Scan  IMPRESSION:   1   No scintigraphic evidence of osseous metastasis     Upcomin/4/22 Dr Jada Grier    He reports feeling well overall  He presents with his wife who used to work in healthcare previously  He notes some mild urinary frequency and very mild urination  He has nocturia 1x per night  IPSS Questionnaire (AUA-7):   Over the past month     1)  How often have you had a sensation of not emptying your bladder completely after you finish urinating? 0 - Not at all   2)  How often have you had to urinate again less than two hours after you finished urinating? 1 - Less than 1 time in 5   3)  How often have you found you stopped and started again several times when you urinated? 0 - Not at all   4) How difficult have you found it to postpone urination? 1 - Less than 1 time in 5   5) How often have you had a weak urinary stream?  0 - Not at all   6) How often have you had to push or strain to begin urination? 0 - Not at all   7) How many times did you most typically get up to urinate from the time you went to bed until the time you got up in the morning? 1 - 1 time   Total Score:  3     Oncology History   Prostate cancer ([facility] )   12/1/2021 Initial Diagnosis    Prostate cancer ([facility] )     12/1/2021 Biopsy    A  Prostate, right lateral base:  - Prostatic adenocarcinoma, [place] score 7, 4+3, Prognostic Grade Group 3, continuously involving 10% of 1 of 1 core  - Periprostatic fat invasion:Not identified  - Lymph-vascular invasion:  not identified  - Perineural invasion: identified  - Additional Pathologic Findings: none identified     B  Prostate, right lateral mid:  - Prostatic adenocarcinoma, Sandhya score 9, 4+5, Prognostic Grade Group 5, continuously involving 100% of 1 of 1 core  - Percentage pattern 4: 70%  - Percentage pattern 5: 5%  - Periprostatic fat invasion:Not identified  - Lymph-vascular invasion:  not identified  - Perineural invasion:Not identified  - Additional Pathologic Findings:  high-grade prostatic intraepithelial neoplasia     C: Prostate, right lateral apex:  - Prostatic adenocarcinoma, [place] score 9, 4+5, Prognostic Grade Group 5, continuously involving 100% of 1 of 1 core    - Percentage pattern 4: 60%  - Percentage pattern 5: 10%  - Periprostatic fat invasion:Not identified  - Lymph-vascular invasion:  not identified  - Perineural invasion: identified  - Additional Pathologic Findings:  high-grade prostatic intraepithelial neoplasia      D  Prostate, right base:  - Prostatic adenocarcinoma, [place] score 7, 4+3, Prognostic Grade Group 3, continuously involving 10% of 1 of 2 cores  - Periprostatic fat invasion:Not identified  - Lymph-vascular invasion:  not identified  - Perineural invasion: identified  - Additional Pathologic Findings: high-grade prostatic intraepithelial neoplasia      E  Prostate, right mid:  - Prostatic adenocarcinoma, [place] score 9, 4+5, Prognostic Grade Group 5, continuously involving 100% of 1 of 1 core  - Percentage pattern 4: 60%  - Percentage pattern 5: 10%  - Periprostatic fat invasion:Not identified  - Lymph-vascular invasion:  not identified  - Perineural invasion: identified  - Additional Pathologic Findings:  high-grade prostatic intraepithelial neoplasia       F  Prostate, right apex:  - Prostatic adenocarcinoma, Sandhya score 9, 4+5, Prognostic Grade Group 5, continuously involving 100% of 1 of 1 core  - Percentage pattern 4: 80%  - Percentage pattern 5: 10%  - Periprostatic fat invasion:Not identified  - Lymph-vascular invasion:  not identified  - Perineural invasion: identified  - Additional Pathologic Findings:  high-grade prostatic intraepithelial neoplasia       G  Prostate, left lateral base:  - Prostatic adenocarcinoma, Sandhya score 8, 4+4, Prognostic Grade Group 4, continuously involving 70% of 1 of 1 core  - Periprostatic fat invasion:Not identified  - Lymph-vascular invasion:  not identified  - Perineural invasion:Not identified  - Additional Pathologic Findings:  high-grade prostatic intraepithelial neoplasia       H  Prostate, left lateral mid:  - Prostatic adenocarcinoma, Sandhya score 8, 4+4, Prognostic Grade Group 4, continuously involving 70% of 1 of 1 core    - Periprostatic fat invasion:Not identified  - Lymph-vascular invasion:  not identified  - Perineural invasion:Not identified  - Additional Pathologic Findings:  high-grade prostatic intraepithelial neoplasia        I  Prostate, left lateral apex:  - Prostatic adenocarcinoma, [place] score 8, 4+4, Prognostic Grade Group 4, continuously involving 100% of 1 of 1 core  - Periprostatic fat invasion:Not identified  - Lymph-vascular invasion:  not identified  - Perineural invasion:Not identified  - Additional Pathologic Findings:  high-grade prostatic intraepithelial neoplasia       J  Prostate, left base:  - Prostatic adenocarcinoma, [place] score 8, 4+4, Prognostic Grade Group 4, continuously involving 60% of 1 of 1 core  - Periprostatic fat invasion:Not identified  - Lymph-vascular invasion:  not identified  - Perineural invasion:Not identified  - Additional Pathologic Findings:  high-grade prostatic intraepithelial neoplasia       K  Prostate, left mid:  - Prostatic adenocarcinoma, [place] score 8, 4+4, Prognostic Grade Group 4, continuously involving 30% of 1 of 1 core  - Periprostatic fat invasion:Not identified  - Lymph-vascular invasion:  not identified  - Perineural invasion:Not identified  - Additional Pathologic Findings: None identified       L  Prostate, left apex:  - Prostatic adenocarcinoma, Sandhya score 9, 4+5, Prognostic Grade Group 5, continuously involving 50% of 1 of 1 core    - Percentage pattern 4: 70%  - Percentage pattern 5: 10%  - Periprostatic fat invasion:Not identified  - Lymph-vascular invasion:  not identified  - Perineural invasion: identified  - Additional Pathologic Findings:  high-grade prostatic intraepithelial neoplasia        12/1/2021 -  Cancer Staged    Staging form: Prostate, AJCC 8th Edition  - Clinical stage from 12/1/2021: Stage IIIC (cT2a, cN0, cM0, PSA: 45 5, Grade Group: 5) - Signed by Torin Heller MD on 1/25/2022  Prostate specific antigen (PSA) range: 20 or greater  Sandhya primary pattern: 4  [place] secondary pattern: 5  Histologic grading system: 5 grade system         Historical Information   Past Medical History:   Diagnosis Date    Hypertension     Prostate cancer [facility])      Past Surgical History:   Procedure Laterality Date    PROSTATE BIOPSY       History reviewed  No pertinent family history  Social History   Social History     Substance and Sexual Activity   Alcohol Use Yes    Comment: Beer and liquor daily-average 2-3 drinks/day  Social History     Substance and Sexual Activity   Drug Use Yes    Types: Marijuana    Comment: Nightly use--since age 23 yo      Social History     Tobacco Use   Smoking Status Former Smoker   Smokeless Tobacco Never Used   Tobacco Comment    Quit smoking approx 7-8 yrs ago/Smoked 1/2 pk daily w/smoking      Meds/Allergies     Current Outpatient Medications:     b complex vitamins capsule, Take 1 capsule by mouth daily, Disp: , Rfl:     cholecalciferol (VITAMIN D3) 400 units tablet, Take 400 Units by mouth daily, Disp: , Rfl:     COD LIVER OIL PO, Take by mouth, Disp: , Rfl:     lisinopril (ZESTRIL) 20 mg tablet, Take 20 mg by mouth daily, Disp: , Rfl:     Turmeric (QC Tumeric Complex) 500 MG CAPS, Take by mouth, Disp: , Rfl:   No Known Allergies    Lab Results/Imaging Studies       Chemistry        Component Value Date/Time    K 4 2 01/12/2022 1115     01/12/2022 1115    CO2 28 01/12/2022 1115    BUN 20 01/12/2022 1115    CREATININE 1 19 01/12/2022 1115        Component Value Date/Time    CALCIUM 10 2 (H) 01/12/2022 1115          No results found for: WBC, HGB, HCT, MCV, PLT  Imaging Studies  NM bone scan whole body    Result Date: 1/19/2022  Narrative: BONE SCAN  WHOLE BODY INDICATION: C61: Malignant neoplasm of prostate PREVIOUS FILM CORRELATION:    CT abdomen pelvis 1/19/2022 TECHNIQUE:   This study was performed following the intravenous administration of 26 7 mCi Tc-99m labeled MDP  Delayed, anterior and posterior whole body images were acquired, 2-3 hours after radiopharmaceutical administration  Additional delayed oblique static images acquired of the bilateral ribs and pelvis   FINDINGS:  Mild focal radiotracer uptake at the left maxilla inferiorly may be related to dental disease  Foci of radiotracer uptake at the bilateral shoulders, manubrioclavicular joints, hips (more prominently on the right) and left knee likely related to arthritic changes given the distribution  There is no scintigraphic evidence of osseous metastasis  The renal activity is symmetric  Impression: 1  No scintigraphic evidence of osseous metastasis  Workstation performed: [id number]     CT abdomen pelvis w contrast    Result Date: 1/19/2022  Narrative: CT ABDOMEN AND PELVIS WITH IV CONTRAST INDICATION:   C61: Malignant neoplasm of prostate  COMPARISON:  Bone scan 1/19/2022 TECHNIQUE:  CT examination of the abdomen and pelvis was performed  Axial, sagittal, and coronal 2D reformatted images were created from the source data and submitted for interpretation  Radiation dose length product (DLP) for this visit:  512 mGy-cm   This examination, like all CT scans performed in the [facility], was performed utilizing techniques to minimize radiation dose exposure, including the use of iterative reconstruction and automated exposure control  IV Contrast:  100 mL of iohexol (OMNIPAQUE) Enteric Contrast:  Enteric contrast was not administered  FINDINGS: ABDOMEN LOWER CHEST:  5 mm left lower lobe nodule posteriorly image 12 series 2  LIVER/BILIARY TREE:  Low attenuation liver suggests fatty infiltration  Indeterminate hypodense lesion in the left lobe of the liver close to the falciform ligament measuring 2 2 x 1 2 cm  There are a few small peripheral calcifications  Although a common location for focal fatty infiltration, this does appear more masslike particularly on coronal reformats  No biliary ductal dilatation  GALLBLADDER:  No calcified gallstones  No pericholecystic inflammatory change  SPLEEN:  Unremarkable  PANCREAS:  Unremarkable   ADRENAL GLANDS:  There is low density lobulated thickening of adrenal glands bilaterally statistically most likely to represent adenomatous hyperplasia  KIDNEYS/URETERS:  One or more small simple renal cyst(s) is noted  Additional subcentimeter hypodensities in the kidneys, too small to characterize but statistically likely small cysts  No hydronephrosis  STOMACH AND BOWEL:  Limited evaluation without enteric contrast   There is colonic diverticulosis without evidence of acute diverticulitis  APPENDIX:  There are expected postoperative changes of appendectomy  ABDOMINOPELVIC CAVITY:  No ascites  No pneumoperitoneum  No lymphadenopathy  VESSELS:  Unremarkable for patient's age  PELVIS REPRODUCTIVE ORGANS:  Mildly prominent prostate, heterogeneous in appearance without obvious mass  URINARY BLADDER:  Underdistended limiting evaluation  ABDOMINAL WALL/INGUINAL REGIONS:  Tiny fat-containing left inguinal hernia suggested  OSSEOUS STRUCTURES:  No acute fracture or destructive osseous lesion  Severe degenerative arthritic changes of the right hip  Scattered degenerative spurring along the visualized spine  Impression: 1  Indeterminate hypodense lesion in the left lobe of the liver  This is not a typical appearance for metastasis but a hepatic neoplasm is not excluded  Recommend follow-up with dedicated MRI of the liver with and without IV contrast  2   5 mm left lower lobe pulmonary nodule  Typical follow up in a patient with history of malignancy is follow up chest CT in 3 months  The study was marked in EPIC for significant notification  Workstation performed: [id number]        Review of Systems  Constitutional: Negative  HENT: Positive for dental problem (Regular dental care  )  Bilateral hearing aids/not wearing them at present time  Eyes:        Wears glasses      Respiratory: Negative  Cardiovascular: Negative  Gastrointestinal: Negative  Endocrine: Negative      Genitourinary:        Stream is fair   No dysuria/no hematuria   Nocturia x 1 Musculoskeletal: Positive for arthralgias (Arthritis left leg)  Skin: Negative  Allergic/Immunologic: Positive for environmental allergies (Poison ivy)  Neurological: Negative  Hematological: Negative  Psychiatric/Behavioral: Positive for sleep disturbance (Insomnia )  OBJECTIVE:   /90   Pulse 84   Temp (!) 97 3 °F (36 3 °C)   Resp 20   Ht 5' 8" (1 727 m)   Wt 81 kg (178 lb 9 2 oz)   SpO2 98%   BMI 27 15 kg/m²   Pain Assessment:  0  Performance Status: ECO - Symptomatic but completely ambulatory    Physical Exam  General Appearance:  Alert, cooperative, no distress, appears stated age  HEENT: normocephalic/atraumatic, neck supple  Cardiovascular:  Extremities warm and well perfused, no lower extremity edema  Lungs: Respirations unlabored, no cyanosis, able to speak in complete sentences without dyspnea  Abdomen: Soft, non-distended  Extremities: No cyanosis or edema, no joint swelling  TAZ: Deferred  Skin: No rash or dermatitis  Neurologic: AAOx3, CNII-XII grossly intact except bilateral hearing loss    Pathology:  See above  ASSESSMENT  1  Prostate cancer [facility])  Ambulatory referral to Radiation Oncology    MRI abdomen w wo contrast       Cancer Staging  Prostate cancer [facility])  Staging form: Prostate, AJCC 8th Edition  - Clinical stage from 2021: Stage IIIC (cT2a, cN0, cM0, PSA: 45 5, Grade Group: 5) - Signed by Ruth Becerra MD on 2022  Prostate specific antigen (PSA) range: 20 or greater  [place] primary pattern: 4  [place] secondary pattern: 5  Histologic grading system: 5 grade system    PLAN  Priyanka Zaidi is a 66 y o  male with 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 ([id number], [place] 4+5, PSA 45 46)  Negative bone scan  CT A/P showed no evidence of metastases in the abdomen pelvis  There was an indeterminate hypodense lesion in the left liver for which an MRI was recommended  There was also a 5mm LLL nodule recommended f/u CT chest in 3 months      As discussed previously with the patient, options include radical prostatectomy vs definitive radiation therapy with 2 years of ADT  We discussed conventionally-fractionated external beam radiation therapy to a total dose of 79 2Gy with 45Gy to the pelvic nodes  We reviewed side effects of external beam radiation therapy including but not limited to skin reaction, lethargy, dysuria, frequency, hematuria, urinary retention possibly requiring catheterization, rectal irritation, permanent rectal ulceration, fistulization, small bowel obstruction, diarrhea, permanent impotence, permanent urinary incontinence, azoospermia, decreased ejaculate, and failure of treatment  At this time, Mr Adonay Valente and his wife are leaning towards surgery  They will continue to consider their options  He is scheduled to see Dr Amanda Montana on 2/4/22 to finalize his decision  I advised him to notify our department of his treatment decision  They seemed anxious about the liver finding so the MRI Liver was ordered and scheduled for 2/8/22  Thank you for the opportunity to participate in the care of this patient  Suni Killian MD  Department of [address] [place] [address] 12    Orders Placed This Encounter   Procedures    MRI abdomen w wo contrast      Portions of the record may have been created with voice recognition software  Occasional wrong word or "sound a like" substitutions may have occurred due to the inherent limitations of voice recognition software  Read the chart carefully and recognize, using context, where substitutions have occurred

## 2022-01-28 ENCOUNTER — PREP FOR PROCEDURE (OUTPATIENT)
Dept: UROLOGY | Facility: AMBULATORY SURGERY CENTER | Age: 72
End: 2022-01-28

## 2022-01-28 ENCOUNTER — TELEPHONE (OUTPATIENT)
Dept: UROLOGY | Facility: AMBULATORY SURGERY CENTER | Age: 72
End: 2022-01-28

## 2022-01-28 DIAGNOSIS — C61 PROSTATE CANCER (HCC): Primary | ICD-10-CM

## 2022-01-28 DIAGNOSIS — R16.0 LIVER MASS, LEFT LOBE: ICD-10-CM

## 2022-01-28 RX ORDER — CEFAZOLIN SODIUM 2 G/50ML
2000 SOLUTION INTRAVENOUS ONCE
Status: CANCELLED | OUTPATIENT
Start: 2022-02-17 | End: 2022-01-28

## 2022-01-28 RX ORDER — SODIUM CHLORIDE 9 MG/ML
125 INJECTION, SOLUTION INTRAVENOUS CONTINUOUS
Status: CANCELLED | OUTPATIENT
Start: 2022-02-17

## 2022-01-28 NOTE — TELEPHONE ENCOUNTER
Patient with a history of Sandhya 4+5=9/10 prostate cancer  His PSA is noted to be 45  CT and bone scan show no evidence of extraprostatic disease, however, the CT scan does show a indeterminate lesion in the left lobe of the liver measuring 2 cm  This will require dedicated MRI of the liver with and without IV contrast   Assuming that this is not a neoplastic process the patient wishes to proceed with robot assisted laparoscopic prostatectomy  He understands that he will be at high risk to receive multimodal therapy including surgery followed by radiation and possibly even androgen deprivation therapy in the future  MRI is scheduled already for 2/8

## 2022-02-01 ENCOUNTER — TELEPHONE (OUTPATIENT)
Dept: UROLOGY | Facility: AMBULATORY SURGERY CENTER | Age: 72
End: 2022-02-01

## 2022-02-01 ENCOUNTER — PREP FOR PROCEDURE (OUTPATIENT)
Dept: UROLOGY | Facility: AMBULATORY SURGERY CENTER | Age: 72
End: 2022-02-01

## 2022-02-01 DIAGNOSIS — Z01.812 PRE-PROCEDURE LAB EXAM: ICD-10-CM

## 2022-02-01 DIAGNOSIS — Z01.810 PREOP CARDIOVASCULAR EXAM: ICD-10-CM

## 2022-02-01 DIAGNOSIS — R39.89 SUSPECTED UTI: ICD-10-CM

## 2022-02-01 DIAGNOSIS — Z01.818 PREOP EXAMINATION: ICD-10-CM

## 2022-02-01 DIAGNOSIS — C61 PROSTATE CANCER (HCC): Primary | ICD-10-CM

## 2022-02-01 DIAGNOSIS — Z79.01 LONG TERM (CURRENT) USE OF ANTICOAGULANTS: ICD-10-CM

## 2022-02-01 NOTE — TELEPHONE ENCOUNTER
I spoke with pt this afternoon to discuss scheduling him for a RALP with Dr Grant Reyes at the [facility] on 2/17/2022  I verbally went over all of pt 's pre op instructions and prep information with pt  Pt will have his testing done next week at a [facility]'s lab as a walk in  He stated that he will  his surgical packet at his appt on 2/4/22  Pt was instructed to call our office with any questions or concerns regarding this procedure

## 2022-02-02 ENCOUNTER — ANESTHESIA EVENT (OUTPATIENT)
Dept: PERIOP | Facility: HOSPITAL | Age: 72
DRG: 707 | End: 2022-02-02
Payer: COMMERCIAL

## 2022-02-08 ENCOUNTER — HOSPITAL ENCOUNTER (OUTPATIENT)
Dept: MRI IMAGING | Facility: HOSPITAL | Age: 72
Discharge: HOME | End: 2022-02-08
Attending: INTERNAL MEDICINE
Payer: COMMERCIAL

## 2022-02-08 DIAGNOSIS — C61 PROSTATE CANCER (HCC): ICD-10-CM

## 2022-02-08 PROCEDURE — A9585 GADOBUTROL INJECTION: HCPCS | Performed by: INTERNAL MEDICINE

## 2022-02-08 PROCEDURE — 74183 MRI ABD W/O CNTR FLWD CNTR: CPT

## 2022-02-08 PROCEDURE — G1004 CDSM NDSC: HCPCS

## 2022-02-08 RX ADMIN — GADOBUTROL 8 ML: 604.72 INJECTION INTRAVENOUS at 13:48

## 2022-02-10 ENCOUNTER — OFFICE VISIT (OUTPATIENT)
Dept: UROLOGY | Facility: AMBULATORY SURGERY CENTER | Age: 72
End: 2022-02-10
Payer: COMMERCIAL

## 2022-02-10 ENCOUNTER — TELEPHONE (OUTPATIENT)
Dept: UROLOGY | Facility: AMBULATORY SURGERY CENTER | Age: 72
End: 2022-02-10

## 2022-02-10 VITALS
HEIGHT: 68 IN | SYSTOLIC BLOOD PRESSURE: 138 MMHG | WEIGHT: 174.4 LBS | BODY MASS INDEX: 26.43 KG/M2 | DIASTOLIC BLOOD PRESSURE: 88 MMHG | HEART RATE: 89 BPM

## 2022-02-10 DIAGNOSIS — C61 PROSTATE CANCER (HCC): Primary | ICD-10-CM

## 2022-02-10 PROCEDURE — 99214 OFFICE O/P EST MOD 30 MIN: CPT | Performed by: UROLOGY

## 2022-02-10 NOTE — TELEPHONE ENCOUNTER
Patient seen for H&P JERICA Couch 2/17  Unable to find 2 week PATH f/u cath removal     Please assist   Thank you!

## 2022-02-10 NOTE — PROGRESS NOTES
2/10/2022    Gonzalo Kulkarni  1950  [id number]        Assessment  [place] 9 prostate cancer      Discussion  I had a lengthy discussion with the patient in the office today regarding his Sandhya 9 prostate cancer in his PSA greater than 40  Fortunately his CT and bone scan show no evidence of extraprostatic disease without evidence of lymphadenopathy or bony osseous metastatic disease  A liver lesion was identified on the CT scan and an MRI was performed  Results are pending at this time  We discussed that solid organ visceral metastases from prostate cancer are unlikely especially if there is no sign of lymphadenopathy or accompanying osseous metastatic disease  He has been referred for radiation oncology consultation and wishes to proceed with robot assisted laparoscopic prostatectomy  Today we discussed the risk and benefits of prostatectomy including, but not limited to, intraoperative injury, postoperative incontinence and erectile dysfunction as well as disease recurrence  We discussed the risk of anastomotic stricture especially if he were to require adjuvant radiation therapy  We discussed that with Sandhya 9 disease in a PSA greater than 40 there is a high likelihood that he will require multimodal treatment  The patient wishes to proceed  Informed consent was obtained  History of Present Illness  66 y o  male with a history of PSA greater than 40 and an abnormal digital rectal examination  This prompted a prostate biopsy which revealed [place] 4+5=9/10 prostate cancer  He was referred to Radiation Oncology for discussion regarding radiation therapy  CT scan and bone scan show no evidence of extraprostatic disease, lymphadenopathy, or osseous metastatic disease  The CT scan did show an abnormal finding within the liver which prompted a MRI of the abdomen  The MRI has been performed but the results of not been reported    He returns in follow-up today to discuss options for treatment for his Sandhya 9 prostate cancer  He wishes to proceed with robot assisted laparoscopic prostatectomy  We discussed that there is a high likelihood that he may ultimately require multimodal treatment based on his high risk disease  AUA Symptom Score      Review of Systems  Review of Systems   Constitutional: Negative  HENT: Negative  Eyes: Negative  Respiratory: Negative  Cardiovascular: Negative  Gastrointestinal: Negative  Endocrine: Negative  Genitourinary:        Per HPI   Musculoskeletal: Negative  Skin: Negative  Allergic/Immunologic: Negative  Neurological: Negative  Hematological: Negative  Psychiatric/Behavioral: Negative  Past Medical History  Past Medical History:   Diagnosis Date    Hypertension     Prostate cancer [facility])        Past Social History  Past Surgical History:   Procedure Laterality Date    PROSTATE BIOPSY         Past Family History  History reviewed  No pertinent family history  Past Social history  Social History     Socioeconomic History    Marital status: /Civil Union     Spouse name: Not on file    Number of children: Not on file    Years of education: Not on file    Highest education level: Not on file   Occupational History    Not on file   Tobacco Use    Smoking status: Former Smoker    Smokeless tobacco: Never Used    Tobacco comment: Quit smoking approx 7-8 yrs ago/Smoked 1/2 pk daily w/smoking    Vaping Use    Vaping Use: Never used   Substance and Sexual Activity    Alcohol use: Yes     Comment: Beer and liquor daily-average 2-3 drinks/day       Drug use: Yes     Types: Marijuana     Comment: Nightly use--since age 25 yo     Sexual activity: Not on file   Other Topics Concern    Not on file   Social History Narrative    Not on file     Social Determinants of Health     Financial Resource Strain: Not on file   Food Insecurity: Not on file   Transportation Needs: Not on file   Physical Activity: Not on file   Stress: Not on file   Social Connections: Not on file   Intimate Partner Violence: Not on file   Housing Stability: Not on file       Current Medications  Current Outpatient Medications   Medication Sig Dispense Refill    b complex vitamins capsule Take 1 capsule by mouth daily      cholecalciferol (VITAMIN D3) 400 units tablet Take 400 Units by mouth daily      COD LIVER OIL PO Take by mouth      lisinopril (ZESTRIL) 20 mg tablet Take 20 mg by mouth daily      Turmeric (QC Tumeric Complex) 500 MG CAPS Take by mouth       No current facility-administered medications for this visit  Allergies  No Known Allergies    Past Medical History, Social History, Family History, medications and allergies were reviewed  Vitals  Vitals:    02/10/22 1106   BP: 138/88   BP Location: Left arm   Patient Position: Sitting   Cuff Size: Adult   Pulse: 89   Weight: 79 1 kg (174 lb 6 4 oz)   Height: 5' 8" (1 727 m)       Physical Exam  Physical Exam  On examination he is in no acute distress  Lungs are clear  Cardiac is regular  Abdomen is soft nontender nondistended   examination reveals no CVA tenderness  Skin is warm  Extremities without edema    Neurologic is grossly intact nonfocal   Gait normal   Affect normal    Results  No results found for: PSA  Lab Results   Component Value Date    CALCIUM 10 2 (H) 01/12/2022    K 4 2 01/12/2022    CO2 28 01/12/2022     01/12/2022    BUN 20 01/12/2022    CREATININE 1 19 01/12/2022     No results found for: WBC, HGB, HCT, MCV, PLT      Office Urine Dip  No results found for this or any previous visit (from the past 1 hour(s)) ]

## 2022-02-10 NOTE — H&P (VIEW-ONLY)
2/10/2022    León Tovar  1950  [id number]        Assessment  [place] 9 prostate cancer      Discussion  I had a lengthy discussion with the patient in the office today regarding his [place] 9 prostate cancer in his PSA greater than 40  Fortunately his CT and bone scan show no evidence of extraprostatic disease without evidence of lymphadenopathy or bony osseous metastatic disease  A liver lesion was identified on the CT scan and an MRI was performed  Results are pending at this time  We discussed that solid organ visceral metastases from prostate cancer are unlikely especially if there is no sign of lymphadenopathy or accompanying osseous metastatic disease  He has been referred for radiation oncology consultation and wishes to proceed with robot assisted laparoscopic prostatectomy  Today we discussed the risk and benefits of prostatectomy including, but not limited to, intraoperative injury, postoperative incontinence and erectile dysfunction as well as disease recurrence  We discussed the risk of anastomotic stricture especially if he were to require adjuvant radiation therapy  We discussed that with Sandhya 9 disease in a PSA greater than 40 there is a high likelihood that he will require multimodal treatment  The patient wishes to proceed  Informed consent was obtained  History of Present Illness  66 y o  male with a history of PSA greater than 40 and an abnormal digital rectal examination  This prompted a prostate biopsy which revealed [place] 4+5=9/10 prostate cancer  He was referred to Radiation Oncology for discussion regarding radiation therapy  CT scan and bone scan show no evidence of extraprostatic disease, lymphadenopathy, or osseous metastatic disease  The CT scan did show an abnormal finding within the liver which prompted a MRI of the abdomen  The MRI has been performed but the results of not been reported    He returns in follow-up today to discuss options for treatment for his [place] 9 prostate cancer  He wishes to proceed with robot assisted laparoscopic prostatectomy  We discussed that there is a high likelihood that he may ultimately require multimodal treatment based on his high risk disease  AUA Symptom Score      Review of Systems  Review of Systems   Constitutional: Negative  HENT: Negative  Eyes: Negative  Respiratory: Negative  Cardiovascular: Negative  Gastrointestinal: Negative  Endocrine: Negative  Genitourinary:        Per HPI   Musculoskeletal: Negative  Skin: Negative  Allergic/Immunologic: Negative  Neurological: Negative  Hematological: Negative  Psychiatric/Behavioral: Negative  Past Medical History  Past Medical History:   Diagnosis Date    Hypertension     Prostate cancer [facility])        Past Social History  Past Surgical History:   Procedure Laterality Date    PROSTATE BIOPSY         Past Family History  History reviewed  No pertinent family history  Past Social history  Social History     Socioeconomic History    Marital status: /Civil Union     Spouse name: Not on file    Number of children: Not on file    Years of education: Not on file    Highest education level: Not on file   Occupational History    Not on file   Tobacco Use    Smoking status: Former Smoker    Smokeless tobacco: Never Used    Tobacco comment: Quit smoking approx 7-8 yrs ago/Smoked 1/2 pk daily w/smoking    Vaping Use    Vaping Use: Never used   Substance and Sexual Activity    Alcohol use: Yes     Comment: Beer and liquor daily-average 2-3 drinks/day       Drug use: Yes     Types: Marijuana     Comment: Nightly use--since age 25 yo     Sexual activity: Not on file   Other Topics Concern    Not on file   Social History Narrative    Not on file     Social Determinants of Health     Financial Resource Strain: Not on file   Food Insecurity: Not on file   Transportation Needs: Not on file   Physical Activity: Not on file   Stress: Not on file   Social Connections: Not on file   Intimate Partner Violence: Not on file   Housing Stability: Not on file       Current Medications  Current Outpatient Medications   Medication Sig Dispense Refill    b complex vitamins capsule Take 1 capsule by mouth daily      cholecalciferol (VITAMIN D3) 400 units tablet Take 400 Units by mouth daily      COD LIVER OIL PO Take by mouth      lisinopril (ZESTRIL) 20 mg tablet Take 20 mg by mouth daily      Turmeric (QC Tumeric Complex) 500 MG CAPS Take by mouth       No current facility-administered medications for this visit  Allergies  No Known Allergies    Past Medical History, Social History, Family History, medications and allergies were reviewed  Vitals  Vitals:    02/10/22 1106   BP: 138/88   BP Location: Left arm   Patient Position: Sitting   Cuff Size: Adult   Pulse: 89   Weight: 79 1 kg (174 lb 6 4 oz)   Height: 5' 8" (1 727 m)       Physical Exam  Physical Exam  On examination he is in no acute distress  Lungs are clear  Cardiac is regular  Abdomen is soft nontender nondistended   examination reveals no CVA tenderness  Skin is warm  Extremities without edema    Neurologic is grossly intact nonfocal   Gait normal   Affect normal    Results  No results found for: PSA  Lab Results   Component Value Date    CALCIUM 10 2 (H) 01/12/2022    K 4 2 01/12/2022    CO2 28 01/12/2022     01/12/2022    BUN 20 01/12/2022    CREATININE 1 19 01/12/2022     No results found for: WBC, HGB, HCT, MCV, PLT      Office Urine Dip  No results found for this or any previous visit (from the past 1 hour(s)) ]

## 2022-02-11 RX ORDER — DIPHENHYDRAMINE HCL 25 MG
25 TABLET ORAL EVERY 6 HOURS PRN
COMMUNITY

## 2022-02-11 NOTE — PRE-PROCEDURE INSTRUCTIONS
Pre-Surgery Instructions:   Medication Instructions    b complex vitamins capsule Instructed patient per Anesthesia Guidelines  Stop 2/12  Do not take morning of surgery    cholecalciferol (VITAMIN D3) 400 units tablet Instructed patient per Anesthesia Guidelines  Stop 2/12  Do not take morning of surgery    COD LIVER OIL PO Instructed patient per Anesthesia Guidelines  Stop 2/12  Do not take morning of surgery    diphenhydrAMINE (BENADRYL) 25 mg tablet Patient was instructed by Physician and understands  Take morning of surgery with small sip of water if needed    lisinopril (ZESTRIL) 20 mg tablet Instructed patient per Anesthesia Guidelines  Do not take 2/16 or 2/17   Turmeric (QC Tumeric Complex) 500 MG CAPS Instructed patient per Anesthesia Guidelines  Stop 2/12  Do not take morning of surgery   Covid screening negative as per patient  Fully vaccinated  Reviewed showering and medication instructions  Stop all non-prescribed vitamins 2/12  Patient verbalized understanding  Advised NPO after MN and ASC will call with scheduled surgical time  Reviewed [facility] prostate booklet and bowel prep instructions  Patient verbalized understanding

## 2022-02-14 ENCOUNTER — APPOINTMENT (OUTPATIENT)
Dept: LAB | Facility: MEDICAL CENTER | Age: 72
DRG: 707 | End: 2022-02-14
Payer: COMMERCIAL

## 2022-02-14 ENCOUNTER — TELEPHONE (OUTPATIENT)
Dept: RADIATION ONCOLOGY | Facility: HOSPITAL | Age: 72
End: 2022-02-14

## 2022-02-14 ENCOUNTER — CLINICAL SUPPORT (OUTPATIENT)
Dept: URGENT CARE | Facility: MEDICAL CENTER | Age: 72
End: 2022-02-14
Payer: COMMERCIAL

## 2022-02-14 DIAGNOSIS — C61 PROSTATE CANCER (HCC): ICD-10-CM

## 2022-02-14 DIAGNOSIS — Z01.810 PREOP CARDIOVASCULAR EXAM: ICD-10-CM

## 2022-02-14 DIAGNOSIS — Z01.818 PREOP EXAMINATION: ICD-10-CM

## 2022-02-14 DIAGNOSIS — R39.89 SUSPECTED UTI: ICD-10-CM

## 2022-02-14 DIAGNOSIS — Z79.01 LONG TERM (CURRENT) USE OF ANTICOAGULANTS: ICD-10-CM

## 2022-02-14 DIAGNOSIS — Z01.812 PRE-PROCEDURE LAB EXAM: ICD-10-CM

## 2022-02-14 LAB
ABO GROUP BLD: NORMAL
APTT PPP: 28 SECONDS (ref 23–37)
ATRIAL RATE: 78 BPM
BASOPHILS # BLD AUTO: 0.05 THOUSANDS/ΜL (ref 0–0.1)
BASOPHILS NFR BLD AUTO: 1 % (ref 0–1)
BLD GP AB SCN SERPL QL: NEGATIVE
EOSINOPHIL # BLD AUTO: 0.17 THOUSAND/ΜL (ref 0–0.61)
EOSINOPHIL NFR BLD AUTO: 2 % (ref 0–6)
ERYTHROCYTE [DISTWIDTH] IN BLOOD BY AUTOMATED COUNT: 13.1 % (ref 11.6–15.1)
HCT VFR BLD AUTO: 43 % (ref 36.5–49.3)
HGB BLD-MCNC: 15 G/DL (ref 12–17)
IMM GRANULOCYTES # BLD AUTO: 0.03 THOUSAND/UL (ref 0–0.2)
IMM GRANULOCYTES NFR BLD AUTO: 0 % (ref 0–2)
INR PPP: 0.88 (ref 0.84–1.19)
LYMPHOCYTES # BLD AUTO: 2.66 THOUSANDS/ΜL (ref 0.6–4.47)
LYMPHOCYTES NFR BLD AUTO: 38 % (ref 14–44)
MCH RBC QN AUTO: 35.9 PG (ref 26.8–34.3)
MCHC RBC AUTO-ENTMCNC: 34.9 G/DL (ref 31.4–37.4)
MCV RBC AUTO: 103 FL (ref 82–98)
MONOCYTES # BLD AUTO: 0.72 THOUSAND/ΜL (ref 0.17–1.22)
MONOCYTES NFR BLD AUTO: 10 % (ref 4–12)
NEUTROPHILS # BLD AUTO: 3.46 THOUSANDS/ΜL (ref 1.85–7.62)
NEUTS SEG NFR BLD AUTO: 49 % (ref 43–75)
NRBC BLD AUTO-RTO: 0 /100 WBCS
P AXIS: 50 DEGREES
PLATELET # BLD AUTO: 196 THOUSANDS/UL (ref 149–390)
PMV BLD AUTO: 10.1 FL (ref 8.9–12.7)
PR INTERVAL: 152 MS
PROTHROMBIN TIME: 11.6 SECONDS (ref 11.6–14.5)
QRS AXIS: -4 DEGREES
QRSD INTERVAL: 82 MS
QT INTERVAL: 378 MS
QTC INTERVAL: 430 MS
RBC # BLD AUTO: 4.18 MILLION/UL (ref 3.88–5.62)
RH BLD: POSITIVE
SPECIMEN EXPIRATION DATE: NORMAL
T WAVE AXIS: 68 DEGREES
VENTRICULAR RATE: 78 BPM
WBC # BLD AUTO: 7.09 THOUSAND/UL (ref 4.31–10.16)

## 2022-02-14 PROCEDURE — 93010 ELECTROCARDIOGRAM REPORT: CPT | Performed by: INTERNAL MEDICINE

## 2022-02-14 PROCEDURE — 85610 PROTHROMBIN TIME: CPT

## 2022-02-14 PROCEDURE — 86900 BLOOD TYPING SEROLOGIC ABO: CPT

## 2022-02-14 PROCEDURE — 86850 RBC ANTIBODY SCREEN: CPT

## 2022-02-14 PROCEDURE — 85025 COMPLETE CBC W/AUTO DIFF WBC: CPT

## 2022-02-14 PROCEDURE — 85730 THROMBOPLASTIN TIME PARTIAL: CPT

## 2022-02-14 PROCEDURE — 87086 URINE CULTURE/COLONY COUNT: CPT

## 2022-02-14 PROCEDURE — 93005 ELECTROCARDIOGRAM TRACING: CPT

## 2022-02-14 PROCEDURE — 86901 BLOOD TYPING SEROLOGIC RH(D): CPT

## 2022-02-14 PROCEDURE — 36415 COLL VENOUS BLD VENIPUNCTURE: CPT

## 2022-02-14 NOTE — TELEPHONE ENCOUNTER
2422 Call to pt  Recent MRI abdomen dated 2 8 22-reviewed findings and reviewed need to follow up with PCP and/or urology, if MRI to be repeated in three months per radiologist notes  Pt has opted for surgical approach to care and will not be seeing Dr Eitan Briscoe for follow up care  Pt verbalizes understanding information above

## 2022-02-15 LAB — BACTERIA UR CULT: NORMAL

## 2022-02-16 ENCOUNTER — TELEPHONE (OUTPATIENT)
Dept: OTHER | Facility: OTHER | Age: 72
End: 2022-02-16

## 2022-02-16 NOTE — TELEPHONE ENCOUNTER
Patient would like to verify instructions about medications prior to his procedure  Please call to discuss

## 2022-02-16 NOTE — TELEPHONE ENCOUNTER
[facility] is requesting a call back to verify all paperwork has been received for patients procedure on 2/17/2022

## 2022-02-16 NOTE — TELEPHONE ENCOUNTER
I returned the call to Celeste Miramontes at the [facility] to confirm that Edgar Subhash had faxed clearance information to our office last week and we did receive everything we needed for pt  I spoke with ANA at the [facility] who stated that Celeste Miramontes was not available  I did ask ANA to let Celeste Miramontes know that we have all needed paperwork for pt 's upcoming surgery tomorrow  I instructed Marmelissafabiola to tell Celeste Miramontes call our office back if she has any questions or concerns

## 2022-02-17 ENCOUNTER — HOSPITAL ENCOUNTER (INPATIENT)
Facility: HOSPITAL | Age: 72
LOS: 4 days | Discharge: HOME | DRG: 707 | End: 2022-02-23
Attending: UROLOGY | Admitting: UROLOGY
Payer: COMMERCIAL

## 2022-02-17 ENCOUNTER — ANESTHESIA (OUTPATIENT)
Dept: PERIOP | Facility: HOSPITAL | Age: 72
DRG: 707 | End: 2022-02-17
Payer: COMMERCIAL

## 2022-02-17 DIAGNOSIS — L03.90 CELLULITIS OF SKIN: Primary | ICD-10-CM

## 2022-02-17 DIAGNOSIS — C61 PROSTATE CANCER (HCC): ICD-10-CM

## 2022-02-17 LAB
ABO GROUP BLD: NORMAL
ANION GAP SERPL CALCULATED.3IONS-SCNC: 13 MMOL/L (ref 4–13)
BUN SERPL-MCNC: 22 MG/DL (ref 5–25)
CALCIUM SERPL-MCNC: 8.5 MG/DL (ref 8.3–10.1)
CHLORIDE SERPL-SCNC: 101 MMOL/L (ref 100–108)
CO2 SERPL-SCNC: 24 MMOL/L (ref 21–32)
CREAT SERPL-MCNC: 1.52 MG/DL (ref 0.6–1.3)
ERYTHROCYTE [DISTWIDTH] IN BLOOD BY AUTOMATED COUNT: 12.6 % (ref 11.6–15.1)
GFR SERPL CREATININE-BSD FRML MDRD: 45 ML/MIN/1.73SQ M
GLUCOSE P FAST SERPL-MCNC: 123 MG/DL (ref 65–99)
GLUCOSE SERPL-MCNC: 123 MG/DL (ref 65–140)
HCT VFR BLD AUTO: 39.3 % (ref 36.5–49.3)
HGB BLD-MCNC: 13.5 G/DL (ref 12–17)
MCH RBC QN AUTO: 36.2 PG (ref 26.8–34.3)
MCHC RBC AUTO-ENTMCNC: 34.4 G/DL (ref 31.4–37.4)
MCV RBC AUTO: 105 FL (ref 82–98)
PLATELET # BLD AUTO: 151 THOUSANDS/UL (ref 149–390)
PMV BLD AUTO: 9.9 FL (ref 8.9–12.7)
POTASSIUM SERPL-SCNC: 3.6 MMOL/L (ref 3.5–5.3)
RBC # BLD AUTO: 3.73 MILLION/UL (ref 3.88–5.62)
RH BLD: POSITIVE
SODIUM SERPL-SCNC: 138 MMOL/L (ref 136–145)
WBC # BLD AUTO: 10.65 THOUSAND/UL (ref 4.31–10.16)

## 2022-02-17 PROCEDURE — 88342 IMHCHEM/IMCYTCHM 1ST ANTB: CPT | Performed by: PATHOLOGY

## 2022-02-17 PROCEDURE — 55866 LAPS SURG PRST8ECT RPBIC RAD: CPT | Performed by: UROLOGY

## 2022-02-17 PROCEDURE — S2900 ROBOTIC SURGICAL SYSTEM: HCPCS | Performed by: UROLOGY

## 2022-02-17 PROCEDURE — 88309 TISSUE EXAM BY PATHOLOGIST: CPT | Performed by: PATHOLOGY

## 2022-02-17 PROCEDURE — 55866 LAPS SURG PRST8ECT RPBIC RAD: CPT | Performed by: PHYSICIAN ASSISTANT

## 2022-02-17 PROCEDURE — 8E0W4CZ ROBOTIC ASSISTED PROCEDURE OF TRUNK REGION, PERCUTANEOUS ENDOSCOPIC APPROACH: ICD-10-PCS | Performed by: UROLOGY

## 2022-02-17 PROCEDURE — 85027 COMPLETE CBC AUTOMATED: CPT | Performed by: UROLOGY

## 2022-02-17 PROCEDURE — 88307 TISSUE EXAM BY PATHOLOGIST: CPT | Performed by: PATHOLOGY

## 2022-02-17 PROCEDURE — 80048 BASIC METABOLIC PNL TOTAL CA: CPT | Performed by: UROLOGY

## 2022-02-17 PROCEDURE — 0VT34ZZ RESECTION OF BILATERAL SEMINAL VESICLES, PERCUTANEOUS ENDOSCOPIC APPROACH: ICD-10-PCS | Performed by: UROLOGY

## 2022-02-17 PROCEDURE — 07TC4ZZ RESECTION OF PELVIS LYMPHATIC, PERCUTANEOUS ENDOSCOPIC APPROACH: ICD-10-PCS | Performed by: UROLOGY

## 2022-02-17 PROCEDURE — 38571 LAPAROSCOPY LYMPHADENECTOMY: CPT | Performed by: UROLOGY

## 2022-02-17 PROCEDURE — 0VT04ZZ RESECTION OF PROSTATE, PERCUTANEOUS ENDOSCOPIC APPROACH: ICD-10-PCS | Performed by: UROLOGY

## 2022-02-17 PROCEDURE — 38571 LAPAROSCOPY LYMPHADENECTOMY: CPT | Performed by: PHYSICIAN ASSISTANT

## 2022-02-17 RX ORDER — MAGNESIUM HYDROXIDE 1200 MG/15ML
LIQUID ORAL AS NEEDED
Status: DISCONTINUED | OUTPATIENT
Start: 2022-02-17 | End: 2022-02-17 | Stop reason: HOSPADM

## 2022-02-17 RX ORDER — FENTANYL CITRATE 50 UG/ML
INJECTION, SOLUTION INTRAMUSCULAR; INTRAVENOUS AS NEEDED
Status: DISCONTINUED | OUTPATIENT
Start: 2022-02-17 | End: 2022-02-17

## 2022-02-17 RX ORDER — SODIUM CHLORIDE, SODIUM LACTATE, POTASSIUM CHLORIDE, CALCIUM CHLORIDE 600; 310; 30; 20 MG/100ML; MG/100ML; MG/100ML; MG/100ML
INJECTION, SOLUTION INTRAVENOUS CONTINUOUS PRN
Status: DISCONTINUED | OUTPATIENT
Start: 2022-02-17 | End: 2022-02-17

## 2022-02-17 RX ORDER — ONDANSETRON 2 MG/ML
4 INJECTION INTRAMUSCULAR; INTRAVENOUS ONCE AS NEEDED
Status: DISCONTINUED | OUTPATIENT
Start: 2022-02-17 | End: 2022-02-17 | Stop reason: HOSPADM

## 2022-02-17 RX ORDER — ACETAMINOPHEN 325 MG/1
650 TABLET ORAL EVERY 6 HOURS SCHEDULED
Status: DISCONTINUED | OUTPATIENT
Start: 2022-02-17 | End: 2022-02-23 | Stop reason: HOSPADM

## 2022-02-17 RX ORDER — CEFAZOLIN SODIUM 2 G/50ML
SOLUTION INTRAVENOUS AS NEEDED
Status: DISCONTINUED | OUTPATIENT
Start: 2022-02-17 | End: 2022-02-17

## 2022-02-17 RX ORDER — MEPERIDINE HYDROCHLORIDE 25 MG/ML
12.5 INJECTION INTRAMUSCULAR; INTRAVENOUS; SUBCUTANEOUS ONCE AS NEEDED
Status: COMPLETED | OUTPATIENT
Start: 2022-02-17 | End: 2022-02-17

## 2022-02-17 RX ORDER — OXYCODONE HYDROCHLORIDE 5 MG/1
5 TABLET ORAL EVERY 4 HOURS PRN
Status: DISCONTINUED | OUTPATIENT
Start: 2022-02-17 | End: 2022-02-23 | Stop reason: HOSPADM

## 2022-02-17 RX ORDER — BUPIVACAINE HYDROCHLORIDE 5 MG/ML
INJECTION, SOLUTION PERINEURAL AS NEEDED
Status: DISCONTINUED | OUTPATIENT
Start: 2022-02-17 | End: 2022-02-17 | Stop reason: HOSPADM

## 2022-02-17 RX ORDER — DEXTROSE, SODIUM CHLORIDE, SODIUM LACTATE, POTASSIUM CHLORIDE, AND CALCIUM CHLORIDE 5; .6; .31; .03; .02 G/100ML; G/100ML; G/100ML; G/100ML; G/100ML
125 INJECTION, SOLUTION INTRAVENOUS CONTINUOUS
Status: DISCONTINUED | OUTPATIENT
Start: 2022-02-17 | End: 2022-02-18

## 2022-02-17 RX ORDER — KETAMINE HYDROCHLORIDE 50 MG/ML
INJECTION, SOLUTION, CONCENTRATE INTRAMUSCULAR; INTRAVENOUS AS NEEDED
Status: DISCONTINUED | OUTPATIENT
Start: 2022-02-17 | End: 2022-02-17

## 2022-02-17 RX ORDER — HYDROMORPHONE HCL/PF 1 MG/ML
0.5 SYRINGE (ML) INJECTION
Status: DISCONTINUED | OUTPATIENT
Start: 2022-02-17 | End: 2022-02-17 | Stop reason: HOSPADM

## 2022-02-17 RX ORDER — LIDOCAINE HYDROCHLORIDE 20 MG/ML
INJECTION, SOLUTION EPIDURAL; INFILTRATION; INTRACAUDAL; PERINEURAL AS NEEDED
Status: DISCONTINUED | OUTPATIENT
Start: 2022-02-17 | End: 2022-02-17

## 2022-02-17 RX ORDER — ROCURONIUM BROMIDE 10 MG/ML
INJECTION, SOLUTION INTRAVENOUS AS NEEDED
Status: DISCONTINUED | OUTPATIENT
Start: 2022-02-17 | End: 2022-02-17

## 2022-02-17 RX ORDER — PROPOFOL 10 MG/ML
INJECTION, EMULSION INTRAVENOUS AS NEEDED
Status: DISCONTINUED | OUTPATIENT
Start: 2022-02-17 | End: 2022-02-17

## 2022-02-17 RX ORDER — SODIUM CHLORIDE 9 MG/ML
125 INJECTION, SOLUTION INTRAVENOUS CONTINUOUS
Status: DISCONTINUED | OUTPATIENT
Start: 2022-02-17 | End: 2022-02-17

## 2022-02-17 RX ORDER — MIDAZOLAM HYDROCHLORIDE 2 MG/2ML
INJECTION, SOLUTION INTRAMUSCULAR; INTRAVENOUS AS NEEDED
Status: DISCONTINUED | OUTPATIENT
Start: 2022-02-17 | End: 2022-02-17

## 2022-02-17 RX ORDER — NEOSTIGMINE METHYLSULFATE 1 MG/ML
INJECTION INTRAVENOUS AS NEEDED
Status: DISCONTINUED | OUTPATIENT
Start: 2022-02-17 | End: 2022-02-17

## 2022-02-17 RX ORDER — PROMETHAZINE HYDROCHLORIDE 25 MG/ML
6.25 INJECTION, SOLUTION INTRAMUSCULAR; INTRAVENOUS ONCE AS NEEDED
Status: DISCONTINUED | OUTPATIENT
Start: 2022-02-17 | End: 2022-02-17 | Stop reason: HOSPADM

## 2022-02-17 RX ORDER — FENTANYL CITRATE/PF 50 MCG/ML
50 SYRINGE (ML) INJECTION
Status: DISCONTINUED | OUTPATIENT
Start: 2022-02-17 | End: 2022-02-17 | Stop reason: HOSPADM

## 2022-02-17 RX ORDER — OXYCODONE HYDROCHLORIDE 5 MG/1
2.5 TABLET ORAL EVERY 4 HOURS PRN
Status: DISCONTINUED | OUTPATIENT
Start: 2022-02-17 | End: 2022-02-23 | Stop reason: HOSPADM

## 2022-02-17 RX ORDER — ONDANSETRON 2 MG/ML
4 INJECTION INTRAMUSCULAR; INTRAVENOUS EVERY 6 HOURS PRN
Status: DISCONTINUED | OUTPATIENT
Start: 2022-02-17 | End: 2022-02-23 | Stop reason: HOSPADM

## 2022-02-17 RX ORDER — GABAPENTIN 300 MG/1
300 CAPSULE ORAL
Status: DISCONTINUED | OUTPATIENT
Start: 2022-02-17 | End: 2022-02-23 | Stop reason: HOSPADM

## 2022-02-17 RX ORDER — ONDANSETRON 2 MG/ML
INJECTION INTRAMUSCULAR; INTRAVENOUS AS NEEDED
Status: DISCONTINUED | OUTPATIENT
Start: 2022-02-17 | End: 2022-02-17

## 2022-02-17 RX ORDER — CEFAZOLIN SODIUM 2 G/50ML
2000 SOLUTION INTRAVENOUS ONCE
Status: DISCONTINUED | OUTPATIENT
Start: 2022-02-17 | End: 2022-02-17 | Stop reason: HOSPADM

## 2022-02-17 RX ORDER — DEXAMETHASONE SODIUM PHOSPHATE 4 MG/ML
INJECTION, SOLUTION INTRA-ARTICULAR; INTRALESIONAL; INTRAMUSCULAR; INTRAVENOUS; SOFT TISSUE AS NEEDED
Status: DISCONTINUED | OUTPATIENT
Start: 2022-02-17 | End: 2022-02-17

## 2022-02-17 RX ORDER — GLYCOPYRROLATE 0.2 MG/ML
INJECTION INTRAMUSCULAR; INTRAVENOUS AS NEEDED
Status: DISCONTINUED | OUTPATIENT
Start: 2022-02-17 | End: 2022-02-17

## 2022-02-17 RX ORDER — KETOROLAC TROMETHAMINE 30 MG/ML
15 INJECTION, SOLUTION INTRAMUSCULAR; INTRAVENOUS EVERY 6 HOURS SCHEDULED
Status: DISCONTINUED | OUTPATIENT
Start: 2022-02-17 | End: 2022-02-18

## 2022-02-17 RX ORDER — BACITRACIN, NEOMYCIN, POLYMYXIN B 400; 3.5; 5 [USP'U]/G; MG/G; [USP'U]/G
1 OINTMENT TOPICAL 2 TIMES DAILY
Status: DISCONTINUED | OUTPATIENT
Start: 2022-02-17 | End: 2022-02-23 | Stop reason: HOSPADM

## 2022-02-17 RX ADMIN — CEFAZOLIN SODIUM 1000 MG: 2 SOLUTION INTRAVENOUS at 12:30

## 2022-02-17 RX ADMIN — FENTANYL CITRATE 50 MCG: 50 INJECTION, SOLUTION INTRAMUSCULAR; INTRAVENOUS at 15:02

## 2022-02-17 RX ADMIN — KETAMINE HYDROCHLORIDE 50 MG: 50 INJECTION INTRAMUSCULAR; INTRAVENOUS at 08:39

## 2022-02-17 RX ADMIN — HYDROMORPHONE HYDROCHLORIDE 0.5 MG: 1 INJECTION, SOLUTION INTRAMUSCULAR; INTRAVENOUS; SUBCUTANEOUS at 15:27

## 2022-02-17 RX ADMIN — SODIUM CHLORIDE, SODIUM LACTATE, POTASSIUM CHLORIDE, AND CALCIUM CHLORIDE: .6; .31; .03; .02 INJECTION, SOLUTION INTRAVENOUS at 10:00

## 2022-02-17 RX ADMIN — CEFAZOLIN SODIUM 2000 MG: 2 SOLUTION INTRAVENOUS at 08:30

## 2022-02-17 RX ADMIN — HYDROMORPHONE HYDROCHLORIDE 0.5 MG: 1 INJECTION, SOLUTION INTRAMUSCULAR; INTRAVENOUS; SUBCUTANEOUS at 15:14

## 2022-02-17 RX ADMIN — MIDAZOLAM 2 MG: 1 INJECTION INTRAMUSCULAR; INTRAVENOUS at 08:28

## 2022-02-17 RX ADMIN — PROPOFOL 200 MG: 10 INJECTION, EMULSION INTRAVENOUS at 08:39

## 2022-02-17 RX ADMIN — ROCURONIUM BROMIDE 70 MG: 50 INJECTION, SOLUTION INTRAVENOUS at 08:39

## 2022-02-17 RX ADMIN — FENTANYL CITRATE 100 MCG: 50 INJECTION INTRAMUSCULAR; INTRAVENOUS at 09:27

## 2022-02-17 RX ADMIN — HYDROMORPHONE HYDROCHLORIDE 0.5 MG: 1 INJECTION, SOLUTION INTRAMUSCULAR; INTRAVENOUS; SUBCUTANEOUS at 15:21

## 2022-02-17 RX ADMIN — KETOROLAC TROMETHAMINE 15 MG: 30 INJECTION, SOLUTION INTRAMUSCULAR at 17:12

## 2022-02-17 RX ADMIN — ROCURONIUM BROMIDE 30 MG: 50 INJECTION, SOLUTION INTRAVENOUS at 10:00

## 2022-02-17 RX ADMIN — MEPERIDINE HYDROCHLORIDE 12.5 MG: 25 INJECTION INTRAMUSCULAR; INTRAVENOUS; SUBCUTANEOUS at 13:05

## 2022-02-17 RX ADMIN — SODIUM CHLORIDE, SODIUM LACTATE, POTASSIUM CHLORIDE, AND CALCIUM CHLORIDE: .6; .31; .03; .02 INJECTION, SOLUTION INTRAVENOUS at 08:44

## 2022-02-17 RX ADMIN — GABAPENTIN 300 MG: 300 CAPSULE ORAL at 21:43

## 2022-02-17 RX ADMIN — BACITRACIN, NEOMYCIN, POLYMYXIN B 1 SMALL APPLICATION: 400; 3.5; 5 OINTMENT TOPICAL at 17:13

## 2022-02-17 RX ADMIN — ONDANSETRON 4 MG: 2 INJECTION INTRAMUSCULAR; INTRAVENOUS at 11:54

## 2022-02-17 RX ADMIN — SODIUM CHLORIDE 125 ML/HR: 0.9 INJECTION, SOLUTION INTRAVENOUS at 06:51

## 2022-02-17 RX ADMIN — FENTANYL CITRATE 100 MCG: 50 INJECTION INTRAMUSCULAR; INTRAVENOUS at 08:39

## 2022-02-17 RX ADMIN — ROCURONIUM BROMIDE 50 MG: 50 INJECTION, SOLUTION INTRAVENOUS at 10:57

## 2022-02-17 RX ADMIN — ACETAMINOPHEN 650 MG: 325 TABLET, FILM COATED ORAL at 17:13

## 2022-02-17 RX ADMIN — ENOXAPARIN SODIUM 40 MG: 40 INJECTION SUBCUTANEOUS at 21:43

## 2022-02-17 RX ADMIN — FENTANYL CITRATE 50 MCG: 50 INJECTION, SOLUTION INTRAMUSCULAR; INTRAVENOUS at 15:08

## 2022-02-17 RX ADMIN — DEXAMETHASONE SODIUM PHOSPHATE 8 MG: 4 INJECTION INTRA-ARTICULAR; INTRALESIONAL; INTRAMUSCULAR; INTRAVENOUS; SOFT TISSUE at 08:44

## 2022-02-17 RX ADMIN — NEOSTIGMINE METHYLSULFATE 4 MG: 1 INJECTION INTRAVENOUS at 12:15

## 2022-02-17 RX ADMIN — LIDOCAINE HYDROCHLORIDE 100 MG: 20 INJECTION, SOLUTION EPIDURAL; INFILTRATION; INTRACAUDAL; PERINEURAL at 08:39

## 2022-02-17 RX ADMIN — SODIUM CHLORIDE: 0.9 INJECTION, SOLUTION INTRAVENOUS at 08:22

## 2022-02-17 RX ADMIN — DEXTROSE, SODIUM CHLORIDE, SODIUM LACTATE, POTASSIUM CHLORIDE, AND CALCIUM CHLORIDE 125 ML/HR: 5; .6; .31; .03; .02 INJECTION, SOLUTION INTRAVENOUS at 15:06

## 2022-02-17 RX ADMIN — GLYCOPYRROLATE 0.4 MG: 0.2 INJECTION, SOLUTION INTRAMUSCULAR; INTRAVENOUS at 12:15

## 2022-02-17 NOTE — OP NOTE
PERATIVE REPORT  PATIENT NAME: Amelia Paige    :  1950  MRN: [id number]  Pt Location: [facility] OR ROOM 07    SURGERY DATE: 2022    Surgeon(s) and Role:     * Purcell Boas, MD - Primary     * Rodney Lynch PA-C - Assisting    Preop Diagnosis:  Prostate cancer ([facility] [address] ) Michael [place]    Post-Op Diagnosis Codes:     * Prostate cancer ([facility] [address] ) Michael Davis    Procedure(s) (LRB):  PROSTATECTOMY RADICAL LAPAROSCOPIC  W ROBOTICS, BPLND, LAPROSCOPIC LYSIS OF ADHESIONS (N/A)    Specimen(s):  ID Type Source Tests Collected by Time Destination   1 : PROSTATE AND SEMINAL VESICLES Tissue Prostate TISSUE EXAM Purcell Boas, MD 2022 1059    2 : RIGHT PELVIC LYMPH NODE Tissue Lymph Node, Regional Resection TISSUE EXAM Purcell Boas, MD 2022 1059    3 : LEFT PELVIC LYMPH NODE Tissue Lymph Node, Regional Resection TISSUE EXAM Purcell Boas, MD 2022 1100        Estimated Blood Loss:   200 cc    Drains:  Closed/Suction Drain Lateral;Left LLQ Bulb 10 Fr  (Active)   Number of days: 0       Urethral Catheter Non-latex 20 Fr  (Active)   Number of days: 0       Anesthesia Type:   General    Operative Indications:  Prostate cancer ([facility] [address] ) [C61]    Operative Findings:  Standard robot assisted laparoscopic prostatectomy  Wide non nerve-sparing resection performed secondary to [place] 9 cancer  No gross evidence of extraprostatic or lymph node disease  Moderate adhesions in the right lower quadrant secondary to prior laparoscopic appendectomy  Complications:   None    Procedure and Technique:  Susan Cuello is a 68-year-old male with a history of [place] 4+5=9/10 prostate cancer identified diffusely throughout the prostate along with Sandhya 7 and 8 disease  His preoperative PSA was 40, however, CT scan and bone scan showed no evidence of extraprostatic disease    A 3 cm liver lesion was identified and a follow-up MRI have an isolated solid organ liver metastasis without any sign of adenopathy or osseous metastatic disease  of the prostate  Risk and benefits of da Mauricio robot-assisted laparoscopic prostatectomy with bilateral pelvic lymph node dissection were discussed and reviewed  Informed consent was obtained and the patient was brought to the operating room on February 17, 2022  After the smooth induction of general endotracheal anesthesia, the patient was placed in a low lithotomy position  Venodyne boots were applied  Pressure points were padded  The patient was secured to the bed with padding, towels, and tape  Intravenous antibiotics were administered  A timeout was performed with all members of the operative team confirming the patient's identity and procedure to be performed  A humphreys catheter was placed at the start of the case  An 8 mm supra-umbilical skin incision was made  The skin was elevated  A Veress needle was utilized to create a pneumoperitoneum  An 8 mm robotic camera port was then placed  The patient was placed into steep Trendelenburg  2 additional 8 mm robotic working robotic ports were placed at the level of the umbilicus and approximately 4 fingerbreadths lateral to the umbilicus  An additional left mid quadrant robotic port was along with a  12 mm assistant port in the right mid abdomen  After laparoscopic lysis of adhesions was performed in the right lower quadrant  The robot was docked  I did not attempt to posterior approach the seminal vesicle secondary to the adhesions in the pelvis  The urachal structures were taken down  The bladder was dropped and the space of Retzius was developed utilizing monopolar scissors and a fenestrated bipolar dissector  Fibrofatty tissue was cleaned from the dorsum of the prostate and the endopelvic fascia  A large superficial venous complex was identified on the dorsum of the prostate  This was taken with bipolar electro dissection    I then opened the endopelvic fascia bilaterally from base to apex first on the right and then on the left   I dropped the puboprostatic ligaments  I exposed the dorsal venous complex  Muscular fibers were swept off the dorsal venous complex to expose the notch between the DVC and the urethra  The dorsal venous complex was ligated with 0 Vicryl x 2  Next, I  focused my attention on the prostate-vesical junction  Hot and cold scissor dissection was utilized to create a plane between the bladder and prostate until the Dove catheter was identified in the midline  The balloon was deflated area the catheter was brought into the surgical field and placed on tension  The posterior bladder neck was taken with hot scissor dissection  A plane was now created behind the bladder and beneath the prostate until denonvilles fascia was identified  I then identified the vasa deferentia  Bilateral vasa deferentia were dissected proximally and transected  I then used each vas as a handle to provide traction to dissect out the ipsilateral seminal vesicle  Both seminal vesicles were dissected to their tip  I then performed exclusive sharp dissection beneath the prostate creating a plane between the prostate and the rectum  Per my discussion with the patient I performed a bilateral wide resection which was non nerve-sparing secondary to his PSA and Sandhya 9 score  Both vascular pedicles were taken with fenestrated bipolar dissection as well as hot and cold scissor dissection  The last remaining attachments were the dorsal venous complex and the urethra  The dorsal venous complex was taken with hot scissors  I then placed the prostate on traction and identified the urethra  The urethra was taken sharply with scissors  The Dove catheter was pulled back and the posterior urethra was taken with sharp scissors as well  The last remaining attachments of the rectourethralis muscle were taken with sharp scissors    At this point this specimen was completely free within the pelvis and placed into an Endo Catch bag     Attention was focused on performing the pelvic lymph node dissection  I first focused on the right side  The external iliac vein was identified and skeletonized  The pelvic lymph node packet adjacent to the pelvic sidewall was elevated off of the sidewall and dissected free from the surrounding tissue  The obturator nerve was identified and preserved  The lymph node packet was completely dissected and removed from the surgical field and sent for permanent specimen  The same procedure was then repeated on the left side  Prior to starting the anastomosis I performed a Jake stitch with a 3-0 V lock to reapproximate the posterior wall the bladder to the rectal urethralis muscle  I then performed the anastomosis between the bladder and urethra  The bladder neck did not require calibration  I started with 2 V lock sutures placed in an outside in fashion on the bladder neck posteriorly  The anastomosis was performed in an inside out fashion on the urethra and an outside in fashion on the bladder  Once the 2 sutures were placed in either side tension was carefully taken off of the sutures until there was excellent reapproximation of the posterior urethra to the posterior bladder neck  I then completed approximately two thirds of the anastomosis in this fashion  I ultimately locked suture on the bladder side and completed the anastomosis in an outside in fashion on the urethra and in an inside out fashion on the bladder  Prior to completing the anastomosis a new Dove catheter was placed under vision  The anastomosis was completed and the sutures were secured  15 cc were placed into the balloon  The catheter was placed on gentle traction and the bladder was irrigated with over 150 cc of sterile saline with minimal extravasation  A Negrito-Amanda drain was then brought out through the left lower quadrant robotic port    The Endo Catch bag string was brought out through the supra umbilical 8 mm port   The supraumbilical 8 mm port was opened to allow for easy removal of the prostate and seminal vesicles within the Endo Catch bag  The fascia was then reapproximated with 0 Prolene suture  All incisions were irrigated and the skin was closed with 4-0 Monocryl and history  The drain was secured in place with a drain stitch and a drain sponge was placed  The catheter was placed onto gentle traction and secured  Overall the patient tolerated the procedure well and there were no complications  The patient was extubated in the operating room and transferred to the PACU in stable condition at the conclusion of the case      Patient Disposition:  PACU stable and extubated      SIGNATURE: Jennifer Fleming MD  DATE: February 17, 2022  TIME: 12:26 PM

## 2022-02-17 NOTE — PLAN OF CARE
Problem: Potential for Falls  Goal: Patient will remain free of falls  Description: INTERVENTIONS:  - Educate patient/family on patient safety including physical limitations  - Instruct patient to call for assistance with activity   - Consult OT/PT to assist with strengthening/mobility   - Keep Call bell within reach  - Keep bed low and locked with side rails adjusted as appropriate  - Keep care items and personal belongings within reach  - Initiate and maintain comfort rounds  - Make Fall Risk Sign visible to staff  - Offer Toileting every  Hours, in advance of need  - Initiate/Maintain alarm  - Obtain necessary fall risk management equipment:   - Apply yellow socks and bracelet for high fall risk patients  - Consider moving patient to room near nurses station  2/17/2022 1606 by Ruth Godfrey RN  Outcome: Progressing  2/17/2022 1606 by Ruth Godfrey RN  Outcome: Progressing     Problem: MOBILITY - ADULT  Goal: Maintain or return to baseline ADL function  Description: INTERVENTIONS:  -  Assess patient's ability to carry out ADLs; assess patient's baseline for ADL function and identify physical deficits which impact ability to perform ADLs (bathing, care of mouth/teeth, toileting, grooming, dressing, etc )  - Assess/evaluate cause of self-care deficits   - Assess range of motion  - Assess patient's mobility; develop plan if impaired  - Assess patient's need for assistive devices and provide as appropriate  - Encourage maximum independence but intervene and supervise when necessary  - Involve family in performance of ADLs  - Assess for home care needs following discharge   - Consider OT consult to assist with ADL evaluation and planning for discharge  - Provide patient education as appropriate  2/17/2022 1606 by Ruth Godfrey RN  Outcome: Progressing  2/17/2022 1606 by Ruth Godfrey RN  Outcome: Progressing  Goal: Maintains/Returns to pre admission functional level  Description: INTERVENTIONS:  - Perform BMAT or MOVE assessment daily    - Set and communicate daily mobility goal to care team and patient/family/caregiver  - Collaborate with rehabilitation services on mobility goals if consulted  - Perform Range of Motion  times a day  - Reposition patient every  hours    - Dangle patient  times a day  - Stand patient  times a day  - Ambulate patient  times a day  - Out of bed to chair  times a day   - Out of bed for meals  times a day  - Out of bed for toileting  - Record patient progress and toleration of activity level   2/17/2022 1606 by Sagar Long RN  Outcome: Progressing  2/17/2022 1606 by Sagar Long RN  Outcome: Progressing     Problem: PAIN - ADULT  Goal: Verbalizes/displays adequate comfort level or baseline comfort level  Description: Interventions:  - Encourage patient to monitor pain and request assistance  - Assess pain using appropriate pain scale  - Administer analgesics based on type and severity of pain and evaluate response  - Implement non-pharmacological measures as appropriate and evaluate response  - Consider cultural and social influences on pain and pain management  - Notify physician/advanced practitioner if interventions unsuccessful or patient reports new pain  Outcome: Progressing     Problem: INFECTION - ADULT  Goal: Absence or prevention of progression during hospitalization  Description: INTERVENTIONS:  - Assess and monitor for signs and symptoms of infection  - Monitor lab/diagnostic results  - Monitor all insertion sites, i e  indwelling lines, tubes, and drains  - Monitor endotracheal if appropriate and nasal secretions for changes in amount and color  - [place] appropriate cooling/warming therapies per order  - Administer medications as ordered  - Instruct and encourage patient and family to use good hand hygiene technique  - Identify and instruct in appropriate isolation precautions for identified infection/condition  Outcome: Progressing  Goal: Absence of fever/infection during neutropenic period  Description: INTERVENTIONS:  - Monitor WBC    Outcome: Progressing     Problem: SAFETY ADULT  Goal: Patient will remain free of falls  Description: INTERVENTIONS:  - Educate patient/family on patient safety including physical limitations  - Instruct patient to call for assistance with activity   - Consult OT/PT to assist with strengthening/mobility   - Keep Call bell within reach  - Keep bed low and locked with side rails adjusted as appropriate  - Keep care items and personal belongings within reach  - Initiate and maintain comfort rounds  - Make Fall Risk Sign visible to staff  - Offer Toileting every  Hours, in advance of need  - Initiate/Maintain alarm  - Obtain necessary fall risk management equipment:   - Apply yellow socks and bracelet for high fall risk patients  - Consider moving patient to room near nurses station  2/17/2022 1606 by Facundo Romero RN  Outcome: Progressing  2/17/2022 1606 by Facundo Romero RN  Outcome: Progressing  Goal: Maintain or return to baseline ADL function  Description: INTERVENTIONS:  -  Assess patient's ability to carry out ADLs; assess patient's baseline for ADL function and identify physical deficits which impact ability to perform ADLs (bathing, care of mouth/teeth, toileting, grooming, dressing, etc )  - Assess/evaluate cause of self-care deficits   - Assess range of motion  - Assess patient's mobility; develop plan if impaired  - Assess patient's need for assistive devices and provide as appropriate  - Encourage maximum independence but intervene and supervise when necessary  - Involve family in performance of ADLs  - Assess for home care needs following discharge   - Consider OT consult to assist with ADL evaluation and planning for discharge  - Provide patient education as appropriate  2/17/2022 1606 by Facundo Romero RN  Outcome: Progressing  2/17/2022 1606 by Facundo Romero RN  Outcome: Progressing  Goal: Maintains/Returns to pre admission functional level  Description: INTERVENTIONS:  - Perform BMAT or MOVE assessment daily    - Set and communicate daily mobility goal to care team and patient/family/caregiver  - Collaborate with rehabilitation services on mobility goals if consulted  - Perform Range of Motion  times a day  - Reposition patient every  hours  - Dangle patient  times a day  - Stand patient  times a day  - Ambulate patient  times a day  - Out of bed to chair  times a day   - Out of bed for meals  times a day  - Out of bed for toileting  - Record patient progress and toleration of activity level   2/17/2022 1606 by Giuliana Steel RN  Outcome: Progressing  2/17/2022 1606 by Giuliana Steel RN  Outcome: Progressing     Problem: DISCHARGE PLANNING  Goal: Discharge to home or other facility with appropriate resources  Description: INTERVENTIONS:  - Identify barriers to discharge w/patient and caregiver  - Arrange for needed discharge resources and transportation as appropriate  - Identify discharge learning needs (meds, wound care, etc )  - Arrange for interpretive services to assist at discharge as needed  - Refer to Case Management Department for coordinating discharge planning if the patient needs post-hospital services based on physician/advanced practitioner order or complex needs related to functional status, cognitive ability, or social support system  Outcome: Progressing     Problem: Knowledge Deficit  Goal: Patient/family/caregiver demonstrates understanding of disease process, treatment plan, medications, and discharge instructions  Description: Complete learning assessment and assess knowledge base    Interventions:  - Provide teaching at level of understanding  - Provide teaching via preferred learning methods  Outcome: Progressing

## 2022-02-17 NOTE — ANESTHESIA PREPROCEDURE EVALUATION
Procedure:  PROSTATECTOMY RADICAL LAPAROSCOPIC  W ROBOTICS, BPLND (N/A Abdomen)    Relevant Problems   ANESTHESIA (within normal limits)      CARDIO (within normal limits)      ENDO (within normal limits)      GI/HEPATIC (within normal limits)      /RENAL   (+) Prostate cancer ([facility] [address] )      GYN (within normal limits)      HEMATOLOGY (within normal limits)      MUSCULOSKELETAL (within normal limits)      NEURO/PSYCH (within normal limits)      PULMONARY (within normal limits)        Physical Exam    Airway    Mallampati score: III  TM Distance: <3 FB  Neck ROM: full     Dental   No notable dental hx     Cardiovascular  Rhythm: regular, Murmur,     Pulmonary  Breath sounds clear to auscultation,     Other Findings        Anesthesia Plan  ASA Score- 2     Anesthesia Type- general with ASA Monitors  Additional Monitors:   Airway Plan: ETT  Plan Factors-Exercise tolerance (METS): >4 METS  Chart reviewed  EKG reviewed  Existing labs reviewed  Patient summary reviewed  Patient is a current smoker  Patient instructed to abstain from smoking on day of procedure  Patient did not smoke on day of surgery  Induction- intravenous  Postoperative Plan- Plan for postoperative opioid use  Planned trial extubation    Informed Consent- Anesthetic plan and risks discussed with patient

## 2022-02-17 NOTE — INTERVAL H&P NOTE
H&P reviewed  After examining the patient I find no changes in the patients condition since the H&P had been written  Vitals:    02/17/22 0714   BP: 168/91   Pulse:    Resp:    Temp:    SpO2:        Darreld Andrew has a history of a PSA greater than 40  He underwent transrectal ultrasound-guided biopsy the prostate revealing [place] 9 disease  Fortunately there is no sign of lymphadenopathy or osseous metastatic disease on CT scan or bone scan  A 3 cm lesion in the liver was identified and workup with an MRI was performed  Although the radiologist as a metastatic focus cannot be excluded, this would be unlikely without signs of lymphadenopathy or osseous metastatic disease  I have discussed this at length with the patient  Based on his overall clinical picture he is most likely to have organ confined prostate cancer at this time or possibly micro metastatic disease but unlikely to have a 3 cm lesion in his liver which may also be a hemangioma by report  The patient is in agreement and wishes to proceed with robot assisted laparoscopic prostatectomy  He clearly understands that he may require multimodal therapy including adjuvant radiation therapy  He also understands that he may require additional workup for his liver lesion with a possible biopsy  Risk and benefits of surgery were discussed reviewed  Risks including, but not limited to, intraoperative abdominal organ injury, postoperative incontinence/erectile dysfunction, as well as the need for adjuvant therapy such as radiation after surgery  Informed consent obtained

## 2022-02-18 LAB
ANION GAP SERPL CALCULATED.3IONS-SCNC: 10 MMOL/L (ref 4–13)
ANION GAP SERPL CALCULATED.3IONS-SCNC: 9 MMOL/L (ref 4–13)
BACTERIA UR QL AUTO: ABNORMAL /HPF
BILIRUB UR QL STRIP: NEGATIVE
BUN SERPL-MCNC: 21 MG/DL (ref 5–25)
BUN SERPL-MCNC: 22 MG/DL (ref 5–25)
CALCIUM SERPL-MCNC: 7.2 MG/DL (ref 8.3–10.1)
CALCIUM SERPL-MCNC: 7.4 MG/DL (ref 8.3–10.1)
CHLORIDE SERPL-SCNC: 101 MMOL/L (ref 100–108)
CHLORIDE SERPL-SCNC: 101 MMOL/L (ref 100–108)
CLARITY UR: ABNORMAL
CO2 SERPL-SCNC: 23 MMOL/L (ref 21–32)
CO2 SERPL-SCNC: 26 MMOL/L (ref 21–32)
COLOR UR: YELLOW
CREAT FLD-MCNC: 2.08 MG/DL
CREAT SERPL-MCNC: 1.71 MG/DL (ref 0.6–1.3)
CREAT SERPL-MCNC: 2.09 MG/DL (ref 0.6–1.3)
ERYTHROCYTE [DISTWIDTH] IN BLOOD BY AUTOMATED COUNT: 12.6 % (ref 11.6–15.1)
GFR SERPL CREATININE-BSD FRML MDRD: 30 ML/MIN/1.73SQ M
GFR SERPL CREATININE-BSD FRML MDRD: 39 ML/MIN/1.73SQ M
GLUCOSE P FAST SERPL-MCNC: 115 MG/DL (ref 65–99)
GLUCOSE SERPL-MCNC: 112 MG/DL (ref 65–140)
GLUCOSE SERPL-MCNC: 115 MG/DL (ref 65–140)
GLUCOSE UR STRIP-MCNC: NEGATIVE MG/DL
HCT VFR BLD AUTO: 32.6 % (ref 36.5–49.3)
HGB BLD-MCNC: 11.3 G/DL (ref 12–17)
HGB UR QL STRIP.AUTO: ABNORMAL
KETONES UR STRIP-MCNC: NEGATIVE MG/DL
LEUKOCYTE ESTERASE UR QL STRIP: ABNORMAL
MCH RBC QN AUTO: 36.5 PG (ref 26.8–34.3)
MCHC RBC AUTO-ENTMCNC: 34.7 G/DL (ref 31.4–37.4)
MCV RBC AUTO: 105 FL (ref 82–98)
NITRITE UR QL STRIP: NEGATIVE
NON-SQ EPI CELLS URNS QL MICRO: ABNORMAL /HPF
PH UR STRIP.AUTO: 6 [PH]
PLATELET # BLD AUTO: 130 THOUSANDS/UL (ref 149–390)
PMV BLD AUTO: 10.6 FL (ref 8.9–12.7)
POTASSIUM SERPL-SCNC: 3.3 MMOL/L (ref 3.5–5.3)
POTASSIUM SERPL-SCNC: 3.6 MMOL/L (ref 3.5–5.3)
PROT UR STRIP-MCNC: NEGATIVE MG/DL
RBC # BLD AUTO: 3.1 MILLION/UL (ref 3.88–5.62)
RBC #/AREA URNS AUTO: ABNORMAL /HPF
SODIUM SERPL-SCNC: 134 MMOL/L (ref 136–145)
SODIUM SERPL-SCNC: 136 MMOL/L (ref 136–145)
SP GR UR STRIP.AUTO: 1.01 (ref 1–1.03)
UROBILINOGEN UR QL STRIP.AUTO: 0.2 E.U./DL
WBC # BLD AUTO: 8.44 THOUSAND/UL (ref 4.31–10.16)
WBC #/AREA URNS AUTO: ABNORMAL /HPF

## 2022-02-18 PROCEDURE — 99024 POSTOP FOLLOW-UP VISIT: CPT | Performed by: UROLOGY

## 2022-02-18 PROCEDURE — 85027 COMPLETE CBC AUTOMATED: CPT | Performed by: UROLOGY

## 2022-02-18 PROCEDURE — 80048 BASIC METABOLIC PNL TOTAL CA: CPT | Performed by: NURSE PRACTITIONER

## 2022-02-18 PROCEDURE — 82570 ASSAY OF URINE CREATININE: CPT | Performed by: NURSE PRACTITIONER

## 2022-02-18 PROCEDURE — 80048 BASIC METABOLIC PNL TOTAL CA: CPT | Performed by: UROLOGY

## 2022-02-18 PROCEDURE — 99204 OFFICE O/P NEW MOD 45 MIN: CPT | Performed by: INTERNAL MEDICINE

## 2022-02-18 PROCEDURE — 81001 URINALYSIS AUTO W/SCOPE: CPT | Performed by: INTERNAL MEDICINE

## 2022-02-18 RX ORDER — SODIUM CHLORIDE, SODIUM GLUCONATE, SODIUM ACETATE, POTASSIUM CHLORIDE, MAGNESIUM CHLORIDE, SODIUM PHOSPHATE, DIBASIC, AND POTASSIUM PHOSPHATE .53; .5; .37; .037; .03; .012; .00082 G/100ML; G/100ML; G/100ML; G/100ML; G/100ML; G/100ML; G/100ML
75 INJECTION, SOLUTION INTRAVENOUS CONTINUOUS
Status: DISPENSED | OUTPATIENT
Start: 2022-02-18 | End: 2022-02-21

## 2022-02-18 RX ORDER — POTASSIUM CHLORIDE 20 MEQ/1
20 TABLET, EXTENDED RELEASE ORAL ONCE
Status: COMPLETED | OUTPATIENT
Start: 2022-02-18 | End: 2022-02-18

## 2022-02-18 RX ADMIN — POTASSIUM CHLORIDE 20 MEQ: 1500 TABLET, EXTENDED RELEASE ORAL at 13:55

## 2022-02-18 RX ADMIN — ENOXAPARIN SODIUM 40 MG: 40 INJECTION SUBCUTANEOUS at 22:45

## 2022-02-18 RX ADMIN — ACETAMINOPHEN 650 MG: 325 TABLET, FILM COATED ORAL at 17:19

## 2022-02-18 RX ADMIN — ACETAMINOPHEN 650 MG: 325 TABLET, FILM COATED ORAL at 06:00

## 2022-02-18 RX ADMIN — SODIUM CHLORIDE, SODIUM GLUCONATE, SODIUM ACETATE, POTASSIUM CHLORIDE, MAGNESIUM CHLORIDE, SODIUM PHOSPHATE, DIBASIC, AND POTASSIUM PHOSPHATE 100 ML/HR: .53; .5; .37; .037; .03; .012; .00082 INJECTION, SOLUTION INTRAVENOUS at 13:55

## 2022-02-18 RX ADMIN — SODIUM CHLORIDE, SODIUM LACTATE, POTASSIUM CHLORIDE, AND CALCIUM CHLORIDE 1000 ML: .6; .31; .03; .02 INJECTION, SOLUTION INTRAVENOUS at 08:08

## 2022-02-18 RX ADMIN — GABAPENTIN 300 MG: 300 CAPSULE ORAL at 22:45

## 2022-02-18 RX ADMIN — DEXTROSE, SODIUM CHLORIDE, SODIUM LACTATE, POTASSIUM CHLORIDE, AND CALCIUM CHLORIDE 125 ML/HR: 5; .6; .31; .03; .02 INJECTION, SOLUTION INTRAVENOUS at 01:26

## 2022-02-18 RX ADMIN — ACETAMINOPHEN 650 MG: 325 TABLET, FILM COATED ORAL at 12:47

## 2022-02-18 RX ADMIN — BACITRACIN, NEOMYCIN, POLYMYXIN B 1 SMALL APPLICATION: 400; 3.5; 5 OINTMENT TOPICAL at 17:18

## 2022-02-18 RX ADMIN — ACETAMINOPHEN 650 MG: 325 TABLET, FILM COATED ORAL at 23:03

## 2022-02-18 RX ADMIN — BACITRACIN, NEOMYCIN, POLYMYXIN B 1 SMALL APPLICATION: 400; 3.5; 5 OINTMENT TOPICAL at 09:47

## 2022-02-18 RX ADMIN — KETOROLAC TROMETHAMINE 15 MG: 30 INJECTION, SOLUTION INTRAMUSCULAR at 06:00

## 2022-02-18 RX ADMIN — KETOROLAC TROMETHAMINE 15 MG: 30 INJECTION, SOLUTION INTRAMUSCULAR at 00:58

## 2022-02-18 RX ADMIN — ACETAMINOPHEN 650 MG: 325 TABLET, FILM COATED ORAL at 00:58

## 2022-02-18 NOTE — PLAN OF CARE
Problem: Potential for Falls  Goal: Patient will remain free of falls  Description: INTERVENTIONS:  - Educate patient/family on patient safety including physical limitations  - Instruct patient to call for assistance with activity   - Consult OT/PT to assist with strengthening/mobility   - Keep Call bell within reach  - Keep bed low and locked with side rails adjusted as appropriate  - Keep care items and personal belongings within reach  - Initiate and maintain comfort rounds  - Make Fall Risk Sign visible to staff  - Offer Toileting every 3 Hours, in advance of need  - Initiate/Maintain bed alarm  - Obtain necessary fall risk management equipment:   - Apply yellow socks and bracelet for high fall risk patients  - Consider moving patient to room near nurses station  Outcome: Progressing     Problem: MOBILITY - ADULT  Goal: Maintain or return to baseline ADL function  Description: INTERVENTIONS:  -  Assess patient's ability to carry out ADLs; assess patient's baseline for ADL function and identify physical deficits which impact ability to perform ADLs (bathing, care of mouth/teeth, toileting, grooming, dressing, etc )  - Assess/evaluate cause of self-care deficits   - Assess range of motion  - Assess patient's mobility; develop plan if impaired  - Assess patient's need for assistive devices and provide as appropriate  - Encourage maximum independence but intervene and supervise when necessary  - Involve family in performance of ADLs  - Assess for home care needs following discharge   - Consider OT consult to assist with ADL evaluation and planning for discharge  - Provide patient education as appropriate  Outcome: Progressing  Goal: Maintains/Returns to pre admission functional level  Description: INTERVENTIONS:  - Perform BMAT or MOVE assessment daily    - Set and communicate daily mobility goal to care team and patient/family/caregiver     - Collaborate with rehabilitation services on mobility goals if consulted  - Perform Range of Motion 3 times a day  - Reposition patient every 2 hours    - Dangle patient 3 times a day  - Stand patient 3 times a day  - Ambulate patient 3 times a day  - Out of bed to chair 3 times a day   - Out of bed for meals 3 times a day  - Out of bed for toileting  - Record patient progress and toleration of activity level   Outcome: Progressing     Problem: PAIN - ADULT  Goal: Verbalizes/displays adequate comfort level or baseline comfort level  Description: Interventions:  - Encourage patient to monitor pain and request assistance  - Assess pain using appropriate pain scale  - Administer analgesics based on type and severity of pain and evaluate response  - Implement non-pharmacological measures as appropriate and evaluate response  - Consider cultural and social influences on pain and pain management  - Notify physician/advanced practitioner if interventions unsuccessful or patient reports new pain  Outcome: Progressing     Problem: INFECTION - ADULT  Goal: Absence or prevention of progression during hospitalization  Description: INTERVENTIONS:  - Assess and monitor for signs and symptoms of infection  - Monitor lab/diagnostic results  - Monitor all insertion sites, i e  indwelling lines, tubes, and drains  - Monitor endotracheal if appropriate and nasal secretions for changes in amount and color  - [place] appropriate cooling/warming therapies per order  - Administer medications as ordered  - Instruct and encourage patient and family to use good hand hygiene technique  - Identify and instruct in appropriate isolation precautions for identified infection/condition  Outcome: Progressing  Goal: Absence of fever/infection during neutropenic period  Description: INTERVENTIONS:  - Monitor WBC    Outcome: Progressing     Problem: SAFETY ADULT  Goal: Patient will remain free of falls  Description: INTERVENTIONS:  - Educate patient/family on patient safety including physical limitations  - Instruct patient to call for assistance with activity   - Consult OT/PT to assist with strengthening/mobility   - Keep Call bell within reach  - Keep bed low and locked with side rails adjusted as appropriate  - Keep care items and personal belongings within reach  - Initiate and maintain comfort rounds  - Make Fall Risk Sign visible to staff  - Offer Toileting every 2 Hours, in advance of need  - Initiate/Maintain bed alarm  - Obtain necessary fall risk management equipment:   - Apply yellow socks and bracelet for high fall risk patients  - Consider moving patient to room near nurses station  Outcome: Progressing  Goal: Maintain or return to baseline ADL function  Description: INTERVENTIONS:  -  Assess patient's ability to carry out ADLs; assess patient's baseline for ADL function and identify physical deficits which impact ability to perform ADLs (bathing, care of mouth/teeth, toileting, grooming, dressing, etc )  - Assess/evaluate cause of self-care deficits   - Assess range of motion  - Assess patient's mobility; develop plan if impaired  - Assess patient's need for assistive devices and provide as appropriate  - Encourage maximum independence but intervene and supervise when necessary  - Involve family in performance of ADLs  - Assess for home care needs following discharge   - Consider OT consult to assist with ADL evaluation and planning for discharge  - Provide patient education as appropriate  Outcome: Progressing  Goal: Maintains/Returns to pre admission functional level  Description: INTERVENTIONS:  - Perform BMAT or MOVE assessment daily    - Set and communicate daily mobility goal to care team and patient/family/caregiver  - Collaborate with rehabilitation services on mobility goals if consulted  - Perform Range of Motion 6 times a day  - Reposition patient every 2 hours    - Dangle patient 6 times a day  - Stand patient 3 times a day  - Ambulate patient 3 times a day  - Out of bed to chair 3 times a day   - Out of bed for meals 3 times a day  - Out of bed for toileting  - Record patient progress and toleration of activity level   Outcome: Progressing     Problem: DISCHARGE PLANNING  Goal: Discharge to home or other facility with appropriate resources  Description: INTERVENTIONS:  - Identify barriers to discharge w/patient and caregiver  - Arrange for needed discharge resources and transportation as appropriate  - Identify discharge learning needs (meds, wound care, etc )  - Arrange for interpretive services to assist at discharge as needed  - Refer to Case Management Department for coordinating discharge planning if the patient needs post-hospital services based on physician/advanced practitioner order or complex needs related to functional status, cognitive ability, or social support system  Outcome: Progressing     Problem: Knowledge Deficit  Goal: Patient/family/caregiver demonstrates understanding of disease process, treatment plan, medications, and discharge instructions  Description: Complete learning assessment and assess knowledge base    Interventions:  - Provide teaching at level of understanding  - Provide teaching via preferred learning methods  Outcome: Progressing

## 2022-02-18 NOTE — PHYSICIAN ADVISOR
Current patient class: Outpatient Surgery  The patient is currently on Hospital Day: 2 at [address]        The patient was admitted to the hospital  on N/A at N/A for the following diagnosis[facility]  [facility]     After review of the relevant documentation, labs, vital signs and test results, the patient is most appropriate to remain OUTPATIENT CLASS today  Rationale is as follows: The patient is a 65 yo male with HTN who was recently diagnosed with prostate CA who was admitted  2/17/22 for radical prostatectomy  Pt had PSA > 40 and underwent a transrectal US guided prostate biopsy revealing [place] 9 disease  No sign of lyphadenopathy or osseous mets noted on CT or bone scan  After lengthy discussion with numerous specialists was admitted yesterday and underwent a radical prostatectomy with bilateral pelvic node dissection and lysis of adhesions  Surgery was uncomplicated  Today urology noted that creat had bumped to 2 0 (was 1 52 day of surgery) and WANDA output was elevated  Nephrology was consulted who noted only other lab hx shows creat 1 19 January 12 2022 but note that he underwent CT a/p with IV contrast 1 week after those labs and unsure if he had insult to kidneys at that time  They suspect acute kidney injury related to hypotension (pt had some towards end of procedure and when he got to the floor post op),  that may have led sot component of ATN  Dove cath in place and pt nonoliguric  They changed IVF to isolate 100cc/hr and ordered u/a, want to continue to avoid any further renal insults and requested creat be resent this afternoon  There was thought that pt might need to stay for another 24 hours if not improved  Creat 1 71 down from 2 0  Pt will likely be discharged home and therefore pt appropriate to stay OP at this time  If for any reason he stays overnight will likely change to IP tomorrow        The patients vitals on arrival were   ED Triage Vitals   Temperature Pulse Respirations Blood Pressure SpO2   02/17/22 0637 02/17/22 0637 02/17/22 0637 02/17/22 0637 02/17/22 0637   97 9 °F (36 6 °C) 102 16 (!) 175/96 97 %      Temp Source Heart Rate Source Patient Position - Orthostatic VS BP Location FiO2 (%)   02/17/22 0637 -- 02/18/22 0750 02/18/22 0750 --   Temporal  Lying Right arm       Pain Score       02/17/22 0708       No Pain           Past Medical History:   Diagnosis Date    Cancer [facility])     prostate  dx 12/2021    Colon polyp     Hearing aid worn     b/l    Hypertension     Prostate cancer ([address] )     Seasonal allergies     Wears glasses      Past Surgical History:   Procedure Laterality Date    APPENDECTOMY      COLONOSCOPY      EGD      HI LAP,PROSTATECTOMY,RADICAL,W/NERVE SPARE,INCL ROBOTIC N/A 2/17/2022    Procedure: PROSTATECTOMY RADICAL LAPAROSCOPIC  W ROBOTICS, BPLND, LAPROSCOPIC LYSIS OF ADHESIONS;  Surgeon: Nirmal Mcadams MD;  Location: [facility];  Service: Urology    PROSTATE BIOPSY      TONSILLECTOMY             Consults have been placed to:   IP CONSULT TO NEPHROLOGY    Vitals:    02/18/22 0400 02/18/22 0750 02/18/22 0933 02/18/22 1531   BP: 109/67 (!) 89/60 126/84 125/83   BP Location:  Right arm  Right arm   Pulse: 81 76 89 91   Resp: 17 17  18   Temp:  98 3 °F (36 8 °C)  98 3 °F (36 8 °C)   TempSrc:  Oral  Oral   SpO2: 99% 97% 99% 97%   Weight:       Height:           Most recent labs:    Recent Labs     02/18/22  0517 02/18/22  0517 02/18/22  1522   WBC 8 44  --   --    HGB 11 3*  --   --    HCT 32 6*  --   --    *  --   --    K 3 3*   < > 3 6   CALCIUM 7 4*   < > 7 2*   BUN 21   < > 22   CREATININE 2 09*   < > 1 71*    < > = values in this interval not displayed         Scheduled Meds:  Current Facility-Administered Medications   Medication Dose Route Frequency Provider Last Rate    acetaminophen  650 mg Oral Q6H [address] Nirmal Mcadams MD      enoxaparin  40 mg Subcutaneous Daily Justin Valentine Александр Manning MD      gabapentin  300 mg Oral HS Mary Pozo MD      morphine injection  2 mg Intravenous Q2H PRN Mary Pozo MD      multi-electrolyte  100 mL/hr Intravenous Continuous Enrigue MD Renate 100 mL/hr (02/18/22 6745)    neomycin-bacitracin-polymyxin b  1 small application Topical BID Mary Pozo MD      ondansetron  4 mg Intravenous Q6H PRN Mary Pozo MD      oxyCODONE  2 5 mg Oral Q4H PRN Mary Pozo MD      oxyCODONE  5 mg Oral Q4H PRN Mary Pozo MD       Continuous Infusions:multi-electrolyte, 100 mL/hr, Last Rate: 100 mL/hr (02/18/22 2595)      PRN Meds: morphine injection    ondansetron    oxyCODONE    oxyCODONE

## 2022-02-18 NOTE — PROGRESS NOTES
Progress Note - Urology  Amelia Paige 1950, 66 y o  male MRN: [id number]    Unit/Bed#: E5 -01 Encounter: [id number]    Prostate Ca  · POD # 1 RALP with BL pelvic lymph node dissection and lysis of adhesions   · Dove draining pale pink-yellow urine  · WANDA serosanguinous drainage-325 ml in 24 hours   · Will remove prior to dc once outputs decrease   · Advance diet     AYO  · Creatinine 1 5 yesterday increased to 2 0 today   · Continue IVFs  · Nephrology consulted  · Repeat BMP at 3 pm today   · Discontinued Toradol     Subjective:   HPI: Patient is doing well  Has some abdominal pain with movement, "feels muscular"  Passing some minimal gas, no BM yet  Diet increased for lunch, tolerating well  Review of Systems   Constitutional: Negative  HENT: Negative  Respiratory: Negative  Gastrointestinal: Positive for abdominal pain  Genitourinary: Negative for hematuria  Musculoskeletal: Negative  Skin: Positive for wound  Neurological: Negative  Hematological: Negative  Psychiatric/Behavioral: Negative  Objective:  Nursing Rounds: RN updated  Vitals: Blood pressure 126/84, pulse 89, temperature 98 3 °F (36 8 °C), temperature source Oral, resp  rate 17, height 5' 8" (1 727 m), weight 77 7 kg (171 lb 4 8 oz), SpO2 99 %  ,Body mass index is 26 05 kg/m²  Physical Exam  Vitals reviewed  Constitutional:       Appearance: He is obese  HENT:      Head: Normocephalic and atraumatic  Cardiovascular:      Rate and Rhythm: Normal rate  Pulmonary:      Effort: Pulmonary effort is normal    Abdominal:      General: Bowel sounds are normal       Tenderness: There is abdominal tenderness  Genitourinary:     Comments: Dove draining pale pink yellow urine   Musculoskeletal:      Cervical back: Normal range of motion  Skin:     General: Skin is warm and dry  Comments: Trocar sites, CDI   Neurological:      Mental Status: He is alert and oriented to person, place, and time  Psychiatric:         Mood and Affect: Mood normal          Behavior: Behavior normal          Thought Content: Thought content normal          Judgment: Judgment normal            Labs:  Recent Labs     22  1420 22  0517   WBC 10 65* 8 44       Recent Labs     22  1420 22  0517   HGB 13 5 11 3*     Recent Labs     22  1420 22  0517   HCT 39 3 32 6*     Recent Labs     22  1420 22  0517   CREATININE 1 52* 2 09*         History:    Past Medical History:   Diagnosis Date    Cancer [facility])     prostate  dx 2021    Colon polyp     Hearing aid worn     b/l    Hypertension     Prostate cancer ([facility] [address] )     Seasonal allergies     Wears glasses      Social History     Socioeconomic History    Marital status: /Civil Union     Spouse name: None    Number of children: None    Years of education: None    Highest education level: None   Occupational History    None   Tobacco Use    Smoking status: Former Smoker     Packs/day: 0 50     Years: 45 00     Pack years: 22 50     Quit date:      Years since quittin 1    Smokeless tobacco: Never Used   Vaping Use    Vaping Use: Never used   Substance and Sexual Activity    Alcohol use: Yes     Alcohol/week: 14 0 standard drinks     Types: 14 Standard drinks or equivalent per week     Comment: Beer and liquor daily-average 2-3 drinks/day       Drug use: Yes     Types: Marijuana     Comment: marijuana   smokes  1 x daily  LD 2022    Sexual activity: Not Currently   Other Topics Concern    None   Social History Narrative    None     Social Determinants of Health     Financial Resource Strain: Not on file   Food Insecurity: Not on file   Transportation Needs: Not on file   Physical Activity: Not on file   Stress: Not on file   Social Connections: Not on file   Intimate Partner Violence: Not on file   Housing Stability: Not on file     Past Surgical History:   Procedure Laterality Date    APPENDECTOMY      COLONOSCOPY      EGD      [place] LAP,PROSTATECTOMY,RADICAL,W/NERVE SPARE,INCL ROBOTIC N/A 2/17/2022    Procedure: PROSTATECTOMY RADICAL LAPAROSCOPIC  W ROBOTICS, BPLND, LAPROSCOPIC LYSIS OF ADHESIONS;  Surgeon: Herberth Ortiz MD;  Location: [facility];  Service: Urology    PROSTATE BIOPSY      TONSILLECTOMY       History reviewed  No pertinent family history      DEMAR Escamilla  Date: 2/18/2022 Time: 2:53 PM

## 2022-02-18 NOTE — CONSULTS
[address] 66 y o  male MRN: [id number]  Unit/Bed#: E5 -01 Encounter: [id number]    ASSESSMENT and PLAN:    41-year-old male with recently diagnosed prostate cancer who was electively admitted to [facility] for prostatectomy  Nephrology consult for acute kidney injury    Acute kidney injury  --postoperative creatinine 1 5 mg/dL, renal function worsening now to 2 mg/dL  --creatinine from January 12 2022 was 1 19 mg/dL, otherwise we have no other prior blood work to comment    --it should be noted that the patient underwent a CT scan with intravenous contrast on January 19th, unsure if that patient underwent contrast associated nephropathy at that time  --cannot fully comment on a baseline creatinine  --suspect acute kidney injury in the setting of hypotension, and fluctuations of blood pressure that may have also led to a component of acute tubular necrosis  --Dove catheter in place, nonoliguric  --may possibly have some underlying chronic kidney disease  --tolerating oral intake  --check urinalysis, though Dove specimen anticipate hematuria  --change fluids to isolate at 100 cc/hour  --monitor urine output  --closely monitor volume status  --avoid any further contrast studies and avoid NSAIDs  --continue to hold lisinopril as you currently are  --maintain systolic blood pressure greater than 120    Hypokalemia  --secondary to intravenous fluids  --replace potassium  --continue balanced crystalloid fluid    Blood pressure/volume status  --history of hypertension  --on lisinopril as an outpatient which has been currently on hold since February 16th  --continue intravenous fluids but change to isolate at 100 cc/hour can bolus if needed  --examined euvolemic  --fluctuations in blood pressure with episodes of hypotension and intra operative hypotension    Prostate cancer  --recently diagnosed  --elevated PSA  --status post radical laparoscopic prostatectomy on February 17th  --Dove catheter in place    SUMMARY OF RECOMMENDATIONS:    Please see above    HISTORY OF PRESENT ILLNESS:  Requesting Physician: Bambi Jean MD  Reason for Consult:  Acute kidney injury    Mickey Bain is a 66 y o  male who was admitted to [facility] after presenting with elective prostatectomy for prostate cancer  A renal consultation is requested today for assistance in the management of acute kidney injury  Patient has recently diagnosed prostate cancer which was initially found by an elevated PSA level  He reported no use significant urinary symptoms such as weak urinary stream   Reported no chest pain or shortness of breath  Has a history of hypertension for many years and maintained on lisinopril  His average blood pressures around 785 systolic  Smoker which she is actively quit many years ago  No family history of kidney disease  He was found to have a new diagnosis prostate cancer and was electively admitted for radical prostatectomy which he underwent yesterday February 17th  Intraoperative report reviewed and blood pressure started to drop at the end of the procedure  He did have episodes of low blood pressure and hypotension on the floor  Examined euvolemic at this time a Dove catheter is in place      PAST MEDICAL HISTORY:  Past Medical History:   Diagnosis Date    Cancer [facility])     prostate  dx 12/2021    Colon polyp     Hearing aid worn     b/l    Hypertension     Prostate cancer ([address] )     Seasonal allergies     Wears glasses        PAST SURGICAL HISTORY:  Past Surgical History:   Procedure Laterality Date    APPENDECTOMY      COLONOSCOPY      EGD      [place] LAP,PROSTATECTOMY,RADICAL,W/NERVE SPARE,INCL ROBOTIC N/A 2/17/2022    Procedure: PROSTATECTOMY RADICAL LAPAROSCOPIC  W ROBOTICS, BPLND, LAPROSCOPIC LYSIS OF ADHESIONS;  Surgeon: Bambi Jean MD;  Location: [facility];  Service: Urology    PROSTATE BIOPSY      TONSILLECTOMY         ALLERGIES:  No Known Allergies    SOCIAL HISTORY:  Social History     Substance and Sexual Activity   Alcohol Use Yes    Alcohol/week: 14 0 standard drinks    Types: 14 Standard drinks or equivalent per week    Comment: Beer and liquor daily-average 2-3 drinks/day  Social History     Substance and Sexual Activity   Drug Use Yes    Types: Marijuana    Comment: marijuana   smokes  1 x daily  LD 2022     Social History     Tobacco Use   Smoking Status Former Smoker    Packs/day: 0 50    Years: 45 00    Pack years: 22 50    Quit date:     Years since quittin 1   Smokeless Tobacco Never Used       FAMILY HISTORY:  History reviewed  No pertinent family history      MEDICATIONS:    Current Facility-Administered Medications:     acetaminophen (TYLENOL) tablet 650 mg, 650 mg, Oral, Q6H [address], Jovita Eaton MD, 650 mg at 22 1247    dextrose 5 % in lactated Ringer's infusion, 125 mL/hr, Intravenous, Continuous, Jovita Eaton MD, Last Rate: 125 mL/hr at 22 0126, 125 mL/hr at 22 0126    enoxaparin (LOVENOX) subcutaneous injection 40 mg, 40 mg, Subcutaneous, Daily, Jovita Eaton MD, 40 mg at 22    gabapentin (NEURONTIN) capsule 300 mg, 300 mg, Oral, HS, Jovita Eaton MD, 300 mg at 22    lactated ringers bolus 1,000 mL, 1,000 mL, Intravenous, Once PRN **AND** lactated ringers bolus 1,000 mL, 1,000 mL, Intravenous, Once PRN, Jovita Eaton MD, Last Rate: 1,000 mL/hr at 22 0808, 1,000 mL at 22 0808    morphine injection 2 mg, 2 mg, Intravenous, Q2H PRN, Jovita Eaton MD    neomycin-bacitracin-polymyxin b (NEOSPORIN) ointment 1 small application, 1 small application, Topical, BID, Jovita Eaton MD, 1 small application at 72/64/50 0947    ondansetron (ZOFRAN) injection 4 mg, 4 mg, Intravenous, Q6H PRN, Jovita Eaton MD    oxyCODONE (ROXICODONE) IR tablet 2 5 mg, 2 5 mg, Oral, Q4H PRN, Austin Marques MD    oxyCODONE (ROXICODONE) IR tablet 5 mg, 5 mg, Oral, Q4H PRN, Austin Marques MD    sodium chloride 0 9 % bolus 1,000 mL, 1,000 mL, Intravenous, Once PRN **AND** sodium chloride 0 9 % bolus 1,000 mL, 1,000 mL, Intravenous, Once PRN, Austin Marques MD    REVIEW OF SYSTEMS:  Constitutional: Negative for fatigue, anorexia, fever, chills, diaphoresis  HENT: Negative for postnasal drip  Eyes: Negative for visual disturbance  Respiratory: Negative for cough, shortness of breath and wheezing  Cardiovascular: Negative for chest pain, palpitations and leg swelling  Gastrointestinal: Negative for abdominal pain, constipation, diarrhea, nausea and vomiting  Genitourinary: No dysuria, hematuria  Endocrine: Negative for polyuria  Musculoskeletal: Negative for arthralgias, back pain and joint swelling  Skin: Negative for rash  Neurological: Negative for focal weakness, headaches, dizziness  Hematological: Negative for easy bruising or bleeding  Psychiatric/Behavioral: Negative for confusion and sleep disturbance  All the systems were reviewed and were negative except as documented on the HPI  PHYSICAL EXAM:  Current Weight: Weight - Scale: 77 7 kg (171 lb 4 8 oz) (with clothing on)  First Weight: Weight - Scale: 77 7 kg (171 lb 4 8 oz) (with clothing on)  Vitals:    02/18/22 0231 02/18/22 0400 02/18/22 0750 02/18/22 0933   BP: 99/55 109/67 (!) 89/60 126/84   BP Location:   Right arm    Pulse: 89 81 76 89   Resp: 17 17 17    Temp: 98 °F (36 7 °C)  98 3 °F (36 8 °C)    TempSrc:   Oral    SpO2: (!) 85% 99% 97% 99%   Weight:       Height:           Intake/Output Summary (Last 24 hours) at 2/18/2022 1316  Last data filed at 2/18/2022 0955  Gross per 24 hour   Intake 2231 25 ml   Output 1425 ml   Net 806 25 ml     Physical Exam  Vitals and nursing note reviewed  Constitutional:       General: He is not in acute distress       Appearance: He is well-developed  He is not diaphoretic  HENT:      Head: Normocephalic and atraumatic  Eyes:      General: No scleral icterus  Pupils: Pupils are equal, round, and reactive to light  Cardiovascular:      Rate and Rhythm: Normal rate and regular rhythm  Heart sounds: Normal heart sounds  No murmur heard  No friction rub  No gallop  Pulmonary:      Effort: Pulmonary effort is normal  No respiratory distress  Breath sounds: Normal breath sounds  No wheezing or rales  Chest:      Chest wall: No tenderness  Abdominal:      General: Bowel sounds are normal  There is no distension  Palpations: Abdomen is soft  Tenderness: There is no abdominal tenderness  There is no rebound  Genitourinary:     Comments: Dove catheter in place with some hematuria noted  Musculoskeletal:         General: Normal range of motion  Cervical back: Normal range of motion and neck supple  Skin:     Findings: No rash  Neurological:      Mental Status: He is alert and oriented to person, place, and time  Invasive Devices:   Urethral Catheter Non-latex 20 Fr   (Active)   Site Assessment Clean;Skin intact 02/18/22 0642   Dove Care Done 02/18/22 0900   Collection Container Standard drainage bag 02/18/22 0642   Output (mL) 350 mL 02/18/22 0955     Lab Results:   Results from last 7 days   Lab Units 02/18/22  0517 02/17/22  1420 02/14/22  1139   WBC Thousand/uL 8 44 10 65* 7 09   HEMOGLOBIN g/dL 11 3* 13 5 15 0   HEMATOCRIT % 32 6* 39 3 43 0   PLATELETS Thousands/uL 130* 151 196   POTASSIUM mmol/L 3 3* 3 6  --    CHLORIDE mmol/L 101 101  --    CO2 mmol/L 26 24  --    BUN mg/dL 21 22  --    CREATININE mg/dL 2 09* 1 52*  --    CALCIUM mg/dL 7 4* 8 5  --

## 2022-02-18 NOTE — PLAN OF CARE
Problem: Potential for Falls  Goal: Patient will remain free of falls  Description: INTERVENTIONS:  - Educate patient/family on patient safety including physical limitations  - Instruct patient to call for assistance with activity   - Consult OT/PT to assist with strengthening/mobility   - Keep Call bell within reach  - Keep bed low and locked with side rails adjusted as appropriate  - Keep care items and personal belongings within reach  - Initiate and maintain comfort rounds  - Make Fall Risk Sign visible to staff  - Offer Toileting every  Hours, in advance of need  - Initiate/Maintain alarm  - Obtain necessary fall risk management equipment:   - Apply yellow socks and bracelet for high fall risk patients  - Consider moving patient to room near nurses station  Outcome: Progressing     Problem: MOBILITY - ADULT  Goal: Maintain or return to baseline ADL function  Description: INTERVENTIONS:  -  Assess patient's ability to carry out ADLs; assess patient's baseline for ADL function and identify physical deficits which impact ability to perform ADLs (bathing, care of mouth/teeth, toileting, grooming, dressing, etc )  - Assess/evaluate cause of self-care deficits   - Assess range of motion  - Assess patient's mobility; develop plan if impaired  - Assess patient's need for assistive devices and provide as appropriate  - Encourage maximum independence but intervene and supervise when necessary  - Involve family in performance of ADLs  - Assess for home care needs following discharge   - Consider OT consult to assist with ADL evaluation and planning for discharge  - Provide patient education as appropriate  Outcome: Progressing  Goal: Maintains/Returns to pre admission functional level  Description: INTERVENTIONS:  - Perform BMAT or MOVE assessment daily    - Set and communicate daily mobility goal to care team and patient/family/caregiver     - Collaborate with rehabilitation services on mobility goals if consulted  - Perform Range of Motion  times a day  - Reposition patient every  hours    - Dangle patient  times a day  - Stand patient  times a day  - Ambulate patient  times a day  - Out of bed to chair  times a day   - Out of bed for meals  times a day  - Out of bed for toileting  - Record patient progress and toleration of activity level   Outcome: Progressing     Problem: PAIN - ADULT  Goal: Verbalizes/displays adequate comfort level or baseline comfort level  Description: Interventions:  - Encourage patient to monitor pain and request assistance  - Assess pain using appropriate pain scale  - Administer analgesics based on type and severity of pain and evaluate response  - Implement non-pharmacological measures as appropriate and evaluate response  - Consider cultural and social influences on pain and pain management  - Notify physician/advanced practitioner if interventions unsuccessful or patient reports new pain  Outcome: Progressing     Problem: INFECTION - ADULT  Goal: Absence or prevention of progression during hospitalization  Description: INTERVENTIONS:  - Assess and monitor for signs and symptoms of infection  - Monitor lab/diagnostic results  - Monitor all insertion sites, i e  indwelling lines, tubes, and drains  - Monitor endotracheal if appropriate and nasal secretions for changes in amount and color  - [place] appropriate cooling/warming therapies per order  - Administer medications as ordered  - Instruct and encourage patient and family to use good hand hygiene technique  - Identify and instruct in appropriate isolation precautions for identified infection/condition  Outcome: Progressing  Goal: Absence of fever/infection during neutropenic period  Description: INTERVENTIONS:  - Monitor WBC    Outcome: Progressing     Problem: SAFETY ADULT  Goal: Patient will remain free of falls  Description: INTERVENTIONS:  - Educate patient/family on patient safety including physical limitations  - Instruct patient to call for assistance with activity   - Consult OT/PT to assist with strengthening/mobility   - Keep Call bell within reach  - Keep bed low and locked with side rails adjusted as appropriate  - Keep care items and personal belongings within reach  - Initiate and maintain comfort rounds  - Make Fall Risk Sign visible to staff  - Offer Toileting every  Hours, in advance of need  - Initiate/Maintain alarm  - Obtain necessary fall risk management equipment:   - Apply yellow socks and bracelet for high fall risk patients  - Consider moving patient to room near nurses station  Outcome: Progressing  Goal: Maintain or return to baseline ADL function  Description: INTERVENTIONS:  -  Assess patient's ability to carry out ADLs; assess patient's baseline for ADL function and identify physical deficits which impact ability to perform ADLs (bathing, care of mouth/teeth, toileting, grooming, dressing, etc )  - Assess/evaluate cause of self-care deficits   - Assess range of motion  - Assess patient's mobility; develop plan if impaired  - Assess patient's need for assistive devices and provide as appropriate  - Encourage maximum independence but intervene and supervise when necessary  - Involve family in performance of ADLs  - Assess for home care needs following discharge   - Consider OT consult to assist with ADL evaluation and planning for discharge  - Provide patient education as appropriate  Outcome: Progressing  Goal: Maintains/Returns to pre admission functional level  Description: INTERVENTIONS:  - Perform BMAT or MOVE assessment daily    - Set and communicate daily mobility goal to care team and patient/family/caregiver  - Collaborate with rehabilitation services on mobility goals if consulted  - Perform Range of Motion  times a day  - Reposition patient every  hours    - Dangle patient  times a day  - Stand patient  times a day  - Ambulate patient  times a day  - Out of bed to chair  times a day   - Out of bed for meals times a day  - Out of bed for toileting  - Record patient progress and toleration of activity level   Outcome: Progressing     Problem: DISCHARGE PLANNING  Goal: Discharge to home or other facility with appropriate resources  Description: INTERVENTIONS:  - Identify barriers to discharge w/patient and caregiver  - Arrange for needed discharge resources and transportation as appropriate  - Identify discharge learning needs (meds, wound care, etc )  - Arrange for interpretive services to assist at discharge as needed  - Refer to Case Management Department for coordinating discharge planning if the patient needs post-hospital services based on physician/advanced practitioner order or complex needs related to functional status, cognitive ability, or social support system  Outcome: Progressing     Problem: Knowledge Deficit  Goal: Patient/family/caregiver demonstrates understanding of disease process, treatment plan, medications, and discharge instructions  Description: Complete learning assessment and assess knowledge base    Interventions:  - Provide teaching at level of understanding  - Provide teaching via preferred learning methods  Outcome: Progressing

## 2022-02-19 LAB
ALBUMIN SERPL BCP-MCNC: 2.6 G/DL (ref 3.5–5)
ALP SERPL-CCNC: 67 U/L (ref 46–116)
ALT SERPL W P-5'-P-CCNC: 33 U/L (ref 12–78)
ANION GAP SERPL CALCULATED.3IONS-SCNC: 11 MMOL/L (ref 4–13)
ANION GAP SERPL CALCULATED.3IONS-SCNC: 7 MMOL/L (ref 4–13)
AST SERPL W P-5'-P-CCNC: 20 U/L (ref 5–45)
BILIRUB SERPL-MCNC: 0.39 MG/DL (ref 0.2–1)
BUN SERPL-MCNC: 20 MG/DL (ref 5–25)
BUN SERPL-MCNC: 21 MG/DL (ref 5–25)
CALCIUM ALBUM COR SERPL-MCNC: 8.3 MG/DL (ref 8.3–10.1)
CALCIUM SERPL-MCNC: 7.2 MG/DL (ref 8.3–10.1)
CALCIUM SERPL-MCNC: 7.6 MG/DL (ref 8.3–10.1)
CHLORIDE SERPL-SCNC: 102 MMOL/L (ref 100–108)
CHLORIDE SERPL-SCNC: 102 MMOL/L (ref 100–108)
CO2 SERPL-SCNC: 25 MMOL/L (ref 21–32)
CO2 SERPL-SCNC: 27 MMOL/L (ref 21–32)
CREAT SERPL-MCNC: 1.97 MG/DL (ref 0.6–1.3)
CREAT SERPL-MCNC: 2.19 MG/DL (ref 0.6–1.3)
GFR SERPL CREATININE-BSD FRML MDRD: 29 ML/MIN/1.73SQ M
GFR SERPL CREATININE-BSD FRML MDRD: 32 ML/MIN/1.73SQ M
GLUCOSE P FAST SERPL-MCNC: 85 MG/DL (ref 65–99)
GLUCOSE SERPL-MCNC: 118 MG/DL (ref 65–140)
GLUCOSE SERPL-MCNC: 85 MG/DL (ref 65–140)
MAGNESIUM SERPL-MCNC: 1.3 MG/DL (ref 1.6–2.6)
POTASSIUM SERPL-SCNC: 3.4 MMOL/L (ref 3.5–5.3)
POTASSIUM SERPL-SCNC: 4 MMOL/L (ref 3.5–5.3)
PROT SERPL-MCNC: 5.7 G/DL (ref 6.4–8.2)
SODIUM SERPL-SCNC: 136 MMOL/L (ref 136–145)
SODIUM SERPL-SCNC: 138 MMOL/L (ref 136–145)

## 2022-02-19 PROCEDURE — 80048 BASIC METABOLIC PNL TOTAL CA: CPT | Performed by: NURSE PRACTITIONER

## 2022-02-19 PROCEDURE — 83735 ASSAY OF MAGNESIUM: CPT | Performed by: INTERNAL MEDICINE

## 2022-02-19 PROCEDURE — 80053 COMPREHEN METABOLIC PANEL: CPT | Performed by: INTERNAL MEDICINE

## 2022-02-19 PROCEDURE — 82570 ASSAY OF URINE CREATININE: CPT | Performed by: NURSE PRACTITIONER

## 2022-02-19 PROCEDURE — 99232 SBSQ HOSP IP/OBS MODERATE 35: CPT | Performed by: INTERNAL MEDICINE

## 2022-02-19 PROCEDURE — 99024 POSTOP FOLLOW-UP VISIT: CPT | Performed by: NURSE PRACTITIONER

## 2022-02-19 RX ORDER — SIMETHICONE 80 MG
80 TABLET,CHEWABLE ORAL EVERY 6 HOURS PRN
Status: DISCONTINUED | OUTPATIENT
Start: 2022-02-19 | End: 2022-02-23 | Stop reason: HOSPADM

## 2022-02-19 RX ORDER — CALCIUM CARBONATE 200(500)MG
500 TABLET,CHEWABLE ORAL DAILY PRN
Status: DISCONTINUED | OUTPATIENT
Start: 2022-02-19 | End: 2022-02-23 | Stop reason: HOSPADM

## 2022-02-19 RX ORDER — POTASSIUM CHLORIDE 20 MEQ/1
40 TABLET, EXTENDED RELEASE ORAL ONCE
Status: COMPLETED | OUTPATIENT
Start: 2022-02-19 | End: 2022-02-19

## 2022-02-19 RX ORDER — SENNOSIDES 8.6 MG
1 TABLET ORAL
Status: DISCONTINUED | OUTPATIENT
Start: 2022-02-19 | End: 2022-02-23 | Stop reason: HOSPADM

## 2022-02-19 RX ORDER — DOCUSATE SODIUM 100 MG/1
100 CAPSULE, LIQUID FILLED ORAL 2 TIMES DAILY
Status: DISCONTINUED | OUTPATIENT
Start: 2022-02-19 | End: 2022-02-23 | Stop reason: HOSPADM

## 2022-02-19 RX ORDER — CALCIUM CARBONATE 200(500)MG
500 TABLET,CHEWABLE ORAL DAILY PRN
Status: CANCELLED | OUTPATIENT
Start: 2022-02-19

## 2022-02-19 RX ADMIN — DOCUSATE SODIUM 100 MG: 100 CAPSULE ORAL at 17:12

## 2022-02-19 RX ADMIN — SODIUM CHLORIDE, SODIUM GLUCONATE, SODIUM ACETATE, POTASSIUM CHLORIDE, MAGNESIUM CHLORIDE, SODIUM PHOSPHATE, DIBASIC, AND POTASSIUM PHOSPHATE 100 ML/HR: .53; .5; .37; .037; .03; .012; .00082 INJECTION, SOLUTION INTRAVENOUS at 09:40

## 2022-02-19 RX ADMIN — SODIUM CHLORIDE, SODIUM GLUCONATE, SODIUM ACETATE, POTASSIUM CHLORIDE, MAGNESIUM CHLORIDE, SODIUM PHOSPHATE, DIBASIC, AND POTASSIUM PHOSPHATE 100 ML/HR: .53; .5; .37; .037; .03; .012; .00082 INJECTION, SOLUTION INTRAVENOUS at 19:20

## 2022-02-19 RX ADMIN — ACETAMINOPHEN 650 MG: 325 TABLET, FILM COATED ORAL at 13:43

## 2022-02-19 RX ADMIN — ENOXAPARIN SODIUM 30 MG: 30 INJECTION, SOLUTION INTRAVENOUS; SUBCUTANEOUS at 23:19

## 2022-02-19 RX ADMIN — SIMETHICONE 80 MG: 80 TABLET, CHEWABLE ORAL at 23:54

## 2022-02-19 RX ADMIN — SENNOSIDES 8.6 MG: 8.6 TABLET ORAL at 23:19

## 2022-02-19 RX ADMIN — ACETAMINOPHEN 650 MG: 325 TABLET, FILM COATED ORAL at 23:19

## 2022-02-19 RX ADMIN — ACETAMINOPHEN 650 MG: 325 TABLET, FILM COATED ORAL at 17:12

## 2022-02-19 RX ADMIN — BACITRACIN, NEOMYCIN, POLYMYXIN B 1 SMALL APPLICATION: 400; 3.5; 5 OINTMENT TOPICAL at 17:12

## 2022-02-19 RX ADMIN — CALCIUM CARBONATE (ANTACID) CHEW TAB 500 MG 500 MG: 500 CHEW TAB at 04:55

## 2022-02-19 RX ADMIN — POTASSIUM CHLORIDE 40 MEQ: 1500 TABLET, EXTENDED RELEASE ORAL at 10:49

## 2022-02-19 RX ADMIN — GABAPENTIN 300 MG: 300 CAPSULE ORAL at 23:19

## 2022-02-19 RX ADMIN — BACITRACIN, NEOMYCIN, POLYMYXIN B 1 SMALL APPLICATION: 400; 3.5; 5 OINTMENT TOPICAL at 09:14

## 2022-02-19 RX ADMIN — ACETAMINOPHEN 650 MG: 325 TABLET, FILM COATED ORAL at 04:55

## 2022-02-19 NOTE — PROGRESS NOTES
NEPHROLOGY PROGRESS NOTE   Minesh Stout 66 y o  male MRN: [id number]  Unit/Bed#: E5 -01 Encounter: [id number]  Reason for Consult: AYO      SUMMARY:    69-year-old male with recently diagnosed prostate cancer who was electively admitted to [facility] for prostatectomy  Nephrology consult for acute kidney injury    ASSESSMENT and PLAN:    Acute kidney injury  --postoperative creatinine 1 5 mg/dL, creatinine peaked at 2 09 mg/dL  --creatinine from January 12 2022 was 1 19 mg/dL, otherwise we have no other prior blood work to comment    --it should be noted that the patient underwent a CT scan with intravenous contrast on January 19th, unsure if that patient underwent contrast associated nephropathy at that time  --cannot fully comment on a baseline creatinine  --suspect acute kidney injury in the setting of hypotension, and fluctuations of blood pressure that may have also led to a component of acute tubular necrosis  --Dove catheter in place, nonoliguric  --may possibly have some underlying chronic kidney disease  --tolerating oral intake  --urinalysis showed large blood, Dove specimen, 10-20 RBCs, no protein  --continue isolate at 100 cc/hour today  --monitor urine output  --closely monitor volume status  --avoid any further contrast studies and avoid NSAIDs  --continue to hold lisinopril as you currently are  --maintain systolic blood pressure greater than 120  --check a BMP later this evening and tomorrow morning  --creatinine appears to be starting to trend down most recently at 1 97 mg/dL but had been 1 7 last night     Hypokalemia  --replace potassium  --continue balanced crystalloid fluid     Blood pressure/volume status  --history of hypertension  --on lisinopril as an outpatient which has been currently on hold since February 16th  --continue intravenous fluids but change to isolate at 100 cc/hour can bolus if needed, can discontinue fluids tomorrow morning  --examined euvolemic  --fluctuations in blood pressure with episodes of hypotension and intra operative hypotension     Prostate cancer  --recently diagnosed  --elevated PSA  --status post radical laparoscopic prostatectomy on February 17th  --Dove catheter in place      SUBJECTIVE / INTERVAL HISTORY:    No complaints    OBJECTIVE:  Current Weight: Weight - Scale: 77 7 kg (171 lb 4 8 oz) (with clothing on)  Vitals:    02/18/22 1531 02/18/22 1924 02/18/22 2323 02/19/22 0737   BP: 125/83 144/91 144/90 151/94   BP Location: Right arm      Pulse: 91 89 86 90   Resp: 18 18 20 17   Temp: 98 3 °F (36 8 °C) 98 1 °F (36 7 °C) 98 8 °F (37 1 °C) 98 1 °F (36 7 °C)   TempSrc: Oral      SpO2: 97% 97% 94% 97%   Weight:       Height:           Intake/Output Summary (Last 24 hours) at 2/19/2022 0942  Last data filed at 2/19/2022 0829  Gross per 24 hour   Intake 1427 08 ml   Output 2455 ml   Net -1027 92 ml       Review of Systems:    12 point ROS has been reviewed  Physical Exam  Vitals and nursing note reviewed  Exam conducted with a chaperone present  Constitutional:       General: He is not in acute distress  Appearance: He is well-developed  He is not diaphoretic  HENT:      Head: Normocephalic and atraumatic  Eyes:      General: No scleral icterus  Pupils: Pupils are equal, round, and reactive to light  Cardiovascular:      Rate and Rhythm: Normal rate and regular rhythm  Heart sounds: Normal heart sounds  No murmur heard  No friction rub  No gallop  Pulmonary:      Effort: Pulmonary effort is normal  No respiratory distress  Breath sounds: Normal breath sounds  No wheezing or rales  Chest:      Chest wall: No tenderness  Abdominal:      General: Bowel sounds are normal  There is no distension  Palpations: Abdomen is soft  Tenderness: There is no abdominal tenderness  There is no rebound  Musculoskeletal:         General: Normal range of motion        Cervical back: Normal range of motion and neck supple  Skin:     Findings: No rash  Neurological:      Mental Status: He is alert and oriented to person, place, and time           Medications:    Current Facility-Administered Medications:     acetaminophen (TYLENOL) tablet 650 mg, 650 mg, Oral, Q6H [address], Dennis Holden MD, 650 mg at 02/19/22 0455    calcium carbonate (TUMS) chewable tablet 500 mg, 500 mg, Oral, Daily PRN, Miladys Sauceda PA-C, 500 mg at 02/19/22 0455    enoxaparin (LOVENOX) subcutaneous injection 40 mg, 40 mg, Subcutaneous, Daily, Dennis Holden MD, 40 mg at 02/18/22 2245    gabapentin (NEURONTIN) capsule 300 mg, 300 mg, Oral, HS, Dennis Holden MD, 300 mg at 02/18/22 2245    morphine injection 2 mg, 2 mg, Intravenous, Q2H PRN, Dennis Holden MD    multi-electrolyte (PLASMALYTE-A/ISOLYTE-S PH 7 4) IV solution, 100 mL/hr, Intravenous, Continuous, Yvan Hines MD, Last Rate: 100 mL/hr at 02/19/22 0940, 100 mL/hr at 02/19/22 0940    neomycin-bacitracin-polymyxin b (NEOSPORIN) ointment 1 small application, 1 small application, Topical, BID, Dennis Holden MD, 1 small application at 30/28/87 0914    ondansetron (ZOFRAN) injection 4 mg, 4 mg, Intravenous, Q6H PRN, Dennis Holden MD    oxyCODONE (ROXICODONE) IR tablet 2 5 mg, 2 5 mg, Oral, Q4H PRN, Dennis Holden MD    oxyCODONE (ROXICODONE) IR tablet 5 mg, 5 mg, Oral, Q4H PRN, Dennis Holden MD    [facility]) chewable tablet 80 mg, 80 mg, Oral, Q6H PRN, Miladys Sauceda PA-C    Laboratory Results:  Results from last 7 days   Lab Units 02/19/22  0504 02/18/22  1522 02/18/22  0517 02/17/22  1420 02/14/22  1139   WBC Thousand/uL  --   --  8 44 10 65* 7 09   HEMOGLOBIN g/dL  --   --  11 3* 13 5 15 0   HEMATOCRIT %  --   --  32 6* 39 3 43 0   PLATELETS Thousands/uL  --   --  130* 151 196   POTASSIUM mmol/L 3 4* 3 6 3 3* 3 6  --    CHLORIDE mmol/L 102 101 101 101  --    CO2 mmol/L 25 23 26 24  --    BUN mg/dL 20 22 21 22 --    CREATININE mg/dL 1 97* 1 71* 2 09* 1 52*  --    CALCIUM mg/dL 7 2* 7 2* 7 4* 8 5  --    MAGNESIUM mg/dL 1 3*  --   --   --   --

## 2022-02-19 NOTE — PLAN OF CARE
Problem: Potential for Falls  Goal: Patient will remain free of falls  Description: INTERVENTIONS:  - Educate patient/family on patient safety including physical limitations  - Instruct patient to call for assistance with activity   - Consult OT/PT to assist with strengthening/mobility   - Keep Call bell within reach  - Keep bed low and locked with side rails adjusted as appropriate  - Keep care items and personal belongings within reach  - Initiate and maintain comfort rounds  - Make Fall Risk Sign visible to staff  - Offer Toileting every  Hours, in advance of need  - Initiate/Maintain alarm  - Obtain necessary fall risk management equipment:   - Apply yellow socks and bracelet for high fall risk patients  - Consider moving patient to room near nurses station  Outcome: Progressing     Problem: MOBILITY - ADULT  Goal: Maintain or return to baseline ADL function  Description: INTERVENTIONS:  -  Assess patient's ability to carry out ADLs; assess patient's baseline for ADL function and identify physical deficits which impact ability to perform ADLs (bathing, care of mouth/teeth, toileting, grooming, dressing, etc )  - Assess/evaluate cause of self-care deficits   - Assess range of motion  - Assess patient's mobility; develop plan if impaired  - Assess patient's need for assistive devices and provide as appropriate  - Encourage maximum independence but intervene and supervise when necessary  - Involve family in performance of ADLs  - Assess for home care needs following discharge   - Consider OT consult to assist with ADL evaluation and planning for discharge  - Provide patient education as appropriate  Outcome: Progressing  Goal: Maintains/Returns to pre admission functional level  Description: INTERVENTIONS:  - Perform BMAT or MOVE assessment daily    - Set and communicate daily mobility goal to care team and patient/family/caregiver     - Collaborate with rehabilitation services on mobility goals if consulted  - Perform Range of Motion  times a day  - Reposition patient every  hours    - Dangle patient  times a day  - Stand patient  times a day  - Ambulate patient  times a day  - Out of bed to chair  times a day   - Out of bed for meals times a day  - Out of bed for toileting  - Record patient progress and toleration of activity level   Outcome: Progressing     Problem: PAIN - ADULT  Goal: Verbalizes/displays adequate comfort level or baseline comfort level  Description: Interventions:  - Encourage patient to monitor pain and request assistance  - Assess pain using appropriate pain scale  - Administer analgesics based on type and severity of pain and evaluate response  - Implement non-pharmacological measures as appropriate and evaluate response  - Consider cultural and social influences on pain and pain management  - Notify physician/advanced practitioner if interventions unsuccessful or patient reports new pain  Outcome: Progressing     Problem: INFECTION - ADULT  Goal: Absence or prevention of progression during hospitalization  Description: INTERVENTIONS:  - Assess and monitor for signs and symptoms of infection  - Monitor lab/diagnostic results  - Monitor all insertion sites, i e  indwelling lines, tubes, and drains  - Monitor endotracheal if appropriate and nasal secretions for changes in amount and color  - [place] appropriate cooling/warming therapies per order  - Administer medications as ordered  - Instruct and encourage patient and family to use good hand hygiene technique  - Identify and instruct in appropriate isolation precautions for identified infection/condition  Outcome: Progressing  Goal: Absence of fever/infection during neutropenic period  Description: INTERVENTIONS:  - Monitor WBC    Outcome: Progressing

## 2022-02-19 NOTE — PROGRESS NOTES
Progress Note - Urology  Stevie De La Torre 1950, 66 y o  male MRN: [id number]    Unit/Bed#: E5 -01 Encounter: [id number]      Prostate Ca  · POD # 2 RALP with BL pelvic lymph node dissection and lysis of adhesions   · Dove draining pale clear yellow urine  · WANDA serosanguinous drainage- outputs increasing  325--380  · Wanda fluid sent yesterday for creatinine   2 0  No urine leak   · Will remove prior to dc once outputs decrease   · Tolerating advanced diet   · Encourage out of bed and ambulation      AYO  · Creatinine 1 5 , increased to 2 0  Repeat check 1 7 now back up to 1 97   · Continue IVFs  · Nephrology following   · BMP ordered for 6 pm  · AM labs   · Avoid hypotension and nephrotoxic medications  Lisinopril on hold, toradol stopped yesterday       Subjective:   HPI:  Patient states he tolerated his meals yesterday however woke up in the night with a lot of gas pain  Pain has now resolved however patient still has not had a bowel movement feels that he needs to  Patient encouraged to get out of bed and ambulate in the halls with nursing  Patient denies any pain at rest   Endorses pain with movement in more muscular  Does have tenderness to palpation  Review of Systems   Constitutional: Negative  HENT: Negative  Respiratory: Negative  Gastrointestinal: Positive for abdominal pain  Negative for nausea and vomiting  Blood in stool: Muscular and tender to touch  Flatus and gas pain   Genitourinary: Negative for hematuria  Musculoskeletal: Negative  Skin: Positive for wound  Neurological: Negative  Hematological: Negative  Psychiatric/Behavioral: Negative  Objective:  Nursing Rounds: updated RN   Vitals: Blood pressure 151/94, pulse 90, temperature 98 1 °F (36 7 °C), resp  rate 17, height 5' 8" (1 727 m), weight 77 7 kg (171 lb 4 8 oz), SpO2 97 %  ,Body mass index is 26 05 kg/m²  Physical Exam  Vitals reviewed  Constitutional:       Appearance: He is obese  HENT:      Head: Normocephalic and atraumatic  Cardiovascular:      Rate and Rhythm: Normal rate  Pulmonary:      Effort: Pulmonary effort is normal    Abdominal:      General: Bowel sounds are normal       Tenderness: There is abdominal tenderness (Suprapubic and lower abdominal tenderness to touch)  Genitourinary:     Penis: Normal        Comments: Dove draining pale yellow urine, no clots  Musculoskeletal:      Cervical back: Normal range of motion  Skin:     General: Skin is warm and dry  Comments: Trocar sites, CDI   Neurological:      Mental Status: He is alert and oriented to person, place, and time  Psychiatric:         Mood and Affect: Mood normal          Behavior: Behavior normal          Thought Content: Thought content normal          Judgment: Judgment normal              Labs:  Recent Labs     22  1420 22  0517   WBC 10 65* 8 44       Recent Labs     22  1420 22  0517   HGB 13 5 11 3*     Recent Labs     22  1420 22  0517   HCT 39 3 32 6*     Recent Labs     22  1420 22  0517 22  1522 22  0504   CREATININE 1 52* 2 09* 1 71* 1 97*         History:    Past Medical History:   Diagnosis Date    [facility])     prostate  dx 2021    Colon polyp     Hearing aid worn     b/l    Hypertension     Prostate cancer ([address] )     Seasonal allergies     Wears glasses      Social History     Socioeconomic History    Marital status: /Civil Union     Spouse name: None    Number of children: None    Years of education: None    Highest education level: None   Occupational History    None   Tobacco Use    Smoking status: Former Smoker     Packs/day: 0 50     Years: 45 00     Pack years: 22 50     Quit date:      Years since quittin 1    Smokeless tobacco: Never Used   Vaping Use    Vaping Use: Never used   Substance and Sexual Activity    Alcohol use:  Yes     Alcohol/week: 14 0 standard drinks     Types: 14 Standard drinks or equivalent per week     Comment: Beer and liquor daily-average 2-3 drinks/day   Drug use: Yes     Types: Marijuana     Comment: marijuana   smokes  1 x daily  LD 1/2022    Sexual activity: Not Currently   Other Topics Concern    None   Social History Narrative    None     Social Determinants of Health     Financial Resource Strain: Not on file   Food Insecurity: Not on file   Transportation Needs: Not on file   Physical Activity: Not on file   Stress: Not on file   Social Connections: Not on file   Intimate Partner Violence: Not on file   Housing Stability: Not on file     Past Surgical History:   Procedure Laterality Date    APPENDECTOMY      COLONOSCOPY      EGD      NH LAP,PROSTATECTOMY,RADICAL,W/NERVE SPARE,INCL ROBOTIC N/A 2/17/2022    Procedure: [address], [facility], [address];  Surgeon: Hari Torres MD;  Location: [facility];  Service: Urology    PROSTATE BIOPSY      TONSILLECTOMY       History reviewed  No pertinent family history      DEMAR Hoffman  Date: 2/19/2022 Time: 9:04 AM

## 2022-02-19 NOTE — PLAN OF CARE
Problem: Potential for Falls  Goal: Patient will remain free of falls  Description: INTERVENTIONS:  - Educate patient/family on patient safety including physical limitations  - Instruct patient to call for assistance with activity   - Consult OT/PT to assist with strengthening/mobility   - Keep Call bell within reach  - Keep bed low and locked with side rails adjusted as appropriate  - Keep care items and personal belongings within reach  - Initiate and maintain comfort rounds  - Make Fall Risk Sign visible to staff  - Offer Toileting every 2 Hours, in advance of need  - Initiate/Maintain bed alarm  - Obtain necessary fall risk management equipment: socks   - Apply yellow socks and bracelet for high fall risk patients  - Consider moving patient to room near nurses station  Outcome: Progressing     Problem: MOBILITY - ADULT  Goal: Maintain or return to baseline ADL function  Description: INTERVENTIONS:  -  Assess patient's ability to carry out ADLs; assess patient's baseline for ADL function and identify physical deficits which impact ability to perform ADLs (bathing, care of mouth/teeth, toileting, grooming, dressing, etc )  - Assess/evaluate cause of self-care deficits   - Assess range of motion  - Assess patient's mobility; develop plan if impaired  - Assess patient's need for assistive devices and provide as appropriate  - Encourage maximum independence but intervene and supervise when necessary  - Involve family in performance of ADLs  - Assess for home care needs following discharge   - Consider OT consult to assist with ADL evaluation and planning for discharge  - Provide patient education as appropriate  Outcome: Progressing  Goal: Maintains/Returns to pre admission functional level  Description: INTERVENTIONS:  - Perform BMAT or MOVE assessment daily    - Set and communicate daily mobility goal to care team and patient/family/caregiver     - Collaborate with rehabilitation services on mobility goals if consulted  - Perform Range of Motion 4 times a day  - Reposition patient every 2 hours    - Dangle patient 3 times a day  - Stand patient 3 times a day  - Ambulate patient 3 times a day  - Out of bed to chair 3 times a day   - Out of bed for meals 3 times a day  - Out of bed for toileting  - Record patient progress and toleration of activity level   Outcome: Progressing     Problem: PAIN - ADULT  Goal: Verbalizes/displays adequate comfort level or baseline comfort level  Description: Interventions:  - Encourage patient to monitor pain and request assistance  - Assess pain using appropriate pain scale  - Administer analgesics based on type and severity of pain and evaluate response  - Implement non-pharmacological measures as appropriate and evaluate response  - Consider cultural and social influences on pain and pain management  - Notify physician/advanced practitioner if interventions unsuccessful or patient reports new pain  Outcome: Progressing     Problem: INFECTION - ADULT  Goal: Absence or prevention of progression during hospitalization  Description: INTERVENTIONS:  - Assess and monitor for signs and symptoms of infection  - Monitor lab/diagnostic results  - Monitor all insertion sites, i e  indwelling lines, tubes, and drains  - Monitor endotracheal if appropriate and nasal secretions for changes in amount and color  - [place] appropriate cooling/warming therapies per order  - Administer medications as ordered  - Instruct and encourage patient and family to use good hand hygiene technique  - Identify and instruct in appropriate isolation precautions for identified infection/condition  Outcome: Progressing  Goal: Absence of fever/infection during neutropenic period  Description: INTERVENTIONS:  - Monitor WBC    Outcome: Progressing     Problem: SAFETY ADULT  Goal: Patient will remain free of falls  Description: INTERVENTIONS:  - Educate patient/family on patient safety including physical limitations  - Instruct patient to call for assistance with activity   - Consult OT/PT to assist with strengthening/mobility   - Keep Call bell within reach  - Keep bed low and locked with side rails adjusted as appropriate  - Keep care items and personal belongings within reach  - Initiate and maintain comfort rounds  - Make Fall Risk Sign visible to staff  - Offer Toileting every 2 Hours, in advance of need  - Initiate/Maintain bed alarm  - Obtain necessary fall risk management equipment: socks   - Apply yellow socks and bracelet for high fall risk patients  - Consider moving patient to room near nurses station  Outcome: Progressing  Goal: Maintain or return to baseline ADL function  Description: INTERVENTIONS:  -  Assess patient's ability to carry out ADLs; assess patient's baseline for ADL function and identify physical deficits which impact ability to perform ADLs (bathing, care of mouth/teeth, toileting, grooming, dressing, etc )  - Assess/evaluate cause of self-care deficits   - Assess range of motion  - Assess patient's mobility; develop plan if impaired  - Assess patient's need for assistive devices and provide as appropriate  - Encourage maximum independence but intervene and supervise when necessary  - Involve family in performance of ADLs  - Assess for home care needs following discharge   - Consider OT consult to assist with ADL evaluation and planning for discharge  - Provide patient education as appropriate  Outcome: Progressing  Goal: Maintains/Returns to pre admission functional level  Description: INTERVENTIONS:  - Perform BMAT or MOVE assessment daily    - Set and communicate daily mobility goal to care team and patient/family/caregiver  - Collaborate with rehabilitation services on mobility goals if consulted  - Perform Range of Motion 4 times a day  - Reposition patient every 2 hours    - Dangle patient 3 times a day  - Stand patient 3 times a day  - Ambulate patient 3 times a day  - Out of bed to chair 3 times a day   - Out of bed for meals 3 times a day  - Out of bed for toileting  - Record patient progress and toleration of activity level   Outcome: Progressing     Problem: DISCHARGE PLANNING  Goal: Discharge to home or other facility with appropriate resources  Description: INTERVENTIONS:  - Identify barriers to discharge w/patient and caregiver  - Arrange for needed discharge resources and transportation as appropriate  - Identify discharge learning needs (meds, wound care, etc )  - Arrange for interpretive services to assist at discharge as needed  - Refer to Case Management Department for coordinating discharge planning if the patient needs post-hospital services based on physician/advanced practitioner order or complex needs related to functional status, cognitive ability, or social support system  Outcome: Progressing     Problem: Knowledge Deficit  Goal: Patient/family/caregiver demonstrates understanding of disease process, treatment plan, medications, and discharge instructions  Description: Complete learning assessment and assess knowledge base    Interventions:  - Provide teaching at level of understanding  - Provide teaching via preferred learning methods  Outcome: Progressing

## 2022-02-20 LAB
ALBUMIN SERPL BCP-MCNC: 2.5 G/DL (ref 3.5–5)
ALP SERPL-CCNC: 78 U/L (ref 46–116)
ALT SERPL W P-5'-P-CCNC: 27 U/L (ref 12–78)
ANION GAP SERPL CALCULATED.3IONS-SCNC: 12 MMOL/L (ref 4–13)
ANION GAP SERPL CALCULATED.3IONS-SCNC: 7 MMOL/L (ref 4–13)
AST SERPL W P-5'-P-CCNC: 18 U/L (ref 5–45)
BASOPHILS # BLD AUTO: 0.02 THOUSANDS/ΜL (ref 0–0.1)
BASOPHILS NFR BLD AUTO: 0 % (ref 0–1)
BILIRUB SERPL-MCNC: 0.45 MG/DL (ref 0.2–1)
BUN SERPL-MCNC: 19 MG/DL (ref 5–25)
BUN SERPL-MCNC: 23 MG/DL (ref 5–25)
CALCIUM ALBUM COR SERPL-MCNC: 8.8 MG/DL (ref 8.3–10.1)
CALCIUM SERPL-MCNC: 7.5 MG/DL (ref 8.3–10.1)
CALCIUM SERPL-MCNC: 7.6 MG/DL (ref 8.3–10.1)
CHLORIDE SERPL-SCNC: 101 MMOL/L (ref 100–108)
CHLORIDE SERPL-SCNC: 102 MMOL/L (ref 100–108)
CO2 SERPL-SCNC: 24 MMOL/L (ref 21–32)
CO2 SERPL-SCNC: 27 MMOL/L (ref 21–32)
CREAT FLD-MCNC: >40 MG/DL
CREAT SERPL-MCNC: 1.8 MG/DL (ref 0.6–1.3)
CREAT SERPL-MCNC: 2.29 MG/DL (ref 0.6–1.3)
EOSINOPHIL # BLD AUTO: 0.03 THOUSAND/ΜL (ref 0–0.61)
EOSINOPHIL NFR BLD AUTO: 0 % (ref 0–6)
ERYTHROCYTE [DISTWIDTH] IN BLOOD BY AUTOMATED COUNT: 12.6 % (ref 11.6–15.1)
GFR SERPL CREATININE-BSD FRML MDRD: 27 ML/MIN/1.73SQ M
GFR SERPL CREATININE-BSD FRML MDRD: 36 ML/MIN/1.73SQ M
GLUCOSE SERPL-MCNC: 101 MG/DL (ref 65–140)
GLUCOSE SERPL-MCNC: 103 MG/DL (ref 65–140)
HCT VFR BLD AUTO: 32.1 % (ref 36.5–49.3)
HGB BLD-MCNC: 11.1 G/DL (ref 12–17)
IMM GRANULOCYTES # BLD AUTO: 0.08 THOUSAND/UL (ref 0–0.2)
IMM GRANULOCYTES NFR BLD AUTO: 1 % (ref 0–2)
LYMPHOCYTES # BLD AUTO: 0.88 THOUSANDS/ΜL (ref 0.6–4.47)
LYMPHOCYTES NFR BLD AUTO: 6 % (ref 14–44)
MCH RBC QN AUTO: 36.4 PG (ref 26.8–34.3)
MCHC RBC AUTO-ENTMCNC: 34.6 G/DL (ref 31.4–37.4)
MCV RBC AUTO: 105 FL (ref 82–98)
MONOCYTES # BLD AUTO: 0.93 THOUSAND/ΜL (ref 0.17–1.22)
MONOCYTES NFR BLD AUTO: 7 % (ref 4–12)
NEUTROPHILS # BLD AUTO: 11.9 THOUSANDS/ΜL (ref 1.85–7.62)
NEUTS SEG NFR BLD AUTO: 86 % (ref 43–75)
NRBC BLD AUTO-RTO: 0 /100 WBCS
PLATELET # BLD AUTO: 159 THOUSANDS/UL (ref 149–390)
PMV BLD AUTO: 9.8 FL (ref 8.9–12.7)
POTASSIUM SERPL-SCNC: 3.7 MMOL/L (ref 3.5–5.3)
POTASSIUM SERPL-SCNC: 3.7 MMOL/L (ref 3.5–5.3)
PROT SERPL-MCNC: 6.3 G/DL (ref 6.4–8.2)
RBC # BLD AUTO: 3.05 MILLION/UL (ref 3.88–5.62)
SODIUM SERPL-SCNC: 136 MMOL/L (ref 136–145)
SODIUM SERPL-SCNC: 137 MMOL/L (ref 136–145)
WBC # BLD AUTO: 13.84 THOUSAND/UL (ref 4.31–10.16)

## 2022-02-20 PROCEDURE — 99024 POSTOP FOLLOW-UP VISIT: CPT | Performed by: NURSE PRACTITIONER

## 2022-02-20 PROCEDURE — 80053 COMPREHEN METABOLIC PANEL: CPT | Performed by: INTERNAL MEDICINE

## 2022-02-20 PROCEDURE — 99232 SBSQ HOSP IP/OBS MODERATE 35: CPT | Performed by: INTERNAL MEDICINE

## 2022-02-20 PROCEDURE — 85025 COMPLETE CBC W/AUTO DIFF WBC: CPT | Performed by: NURSE PRACTITIONER

## 2022-02-20 PROCEDURE — 80048 BASIC METABOLIC PNL TOTAL CA: CPT | Performed by: NURSE PRACTITIONER

## 2022-02-20 RX ORDER — HYDROMORPHONE HCL/PF 1 MG/ML
0.5 SYRINGE (ML) INJECTION EVERY 6 HOURS PRN
Status: DISCONTINUED | OUTPATIENT
Start: 2022-02-20 | End: 2022-02-23 | Stop reason: HOSPADM

## 2022-02-20 RX ORDER — HYDROMORPHONE HCL/PF 1 MG/ML
0.5 SYRINGE (ML) INJECTION ONCE
Status: COMPLETED | OUTPATIENT
Start: 2022-02-20 | End: 2022-02-20

## 2022-02-20 RX ADMIN — ACETAMINOPHEN 650 MG: 325 TABLET, FILM COATED ORAL at 05:14

## 2022-02-20 RX ADMIN — HYDROMORPHONE HYDROCHLORIDE 0.5 MG: 1 INJECTION, SOLUTION INTRAMUSCULAR; INTRAVENOUS; SUBCUTANEOUS at 20:47

## 2022-02-20 RX ADMIN — SIMETHICONE 80 MG: 80 TABLET, CHEWABLE ORAL at 12:33

## 2022-02-20 RX ADMIN — SIMETHICONE 80 MG: 80 TABLET, CHEWABLE ORAL at 20:47

## 2022-02-20 RX ADMIN — ENOXAPARIN SODIUM 30 MG: 30 INJECTION, SOLUTION INTRAVENOUS; SUBCUTANEOUS at 23:03

## 2022-02-20 RX ADMIN — BACITRACIN, NEOMYCIN, POLYMYXIN B 1 SMALL APPLICATION: 400; 3.5; 5 OINTMENT TOPICAL at 17:50

## 2022-02-20 RX ADMIN — HYDROMORPHONE HYDROCHLORIDE 0.5 MG: 1 INJECTION, SOLUTION INTRAMUSCULAR; INTRAVENOUS; SUBCUTANEOUS at 13:56

## 2022-02-20 RX ADMIN — BACITRACIN, NEOMYCIN, POLYMYXIN B 1 SMALL APPLICATION: 400; 3.5; 5 OINTMENT TOPICAL at 09:40

## 2022-02-20 RX ADMIN — SODIUM CHLORIDE, SODIUM GLUCONATE, SODIUM ACETATE, POTASSIUM CHLORIDE, MAGNESIUM CHLORIDE, SODIUM PHOSPHATE, DIBASIC, AND POTASSIUM PHOSPHATE 100 ML/HR: .53; .5; .37; .037; .03; .012; .00082 INJECTION, SOLUTION INTRAVENOUS at 05:14

## 2022-02-20 RX ADMIN — ACETAMINOPHEN 650 MG: 325 TABLET, FILM COATED ORAL at 12:27

## 2022-02-20 RX ADMIN — ACETAMINOPHEN 650 MG: 325 TABLET, FILM COATED ORAL at 17:50

## 2022-02-20 RX ADMIN — ACETAMINOPHEN 650 MG: 325 TABLET, FILM COATED ORAL at 23:02

## 2022-02-20 RX ADMIN — GABAPENTIN 300 MG: 300 CAPSULE ORAL at 23:03

## 2022-02-20 NOTE — PROGRESS NOTES
Progress Note - Urology  Jose C Lozano 1950, 66 y o  male MRN: [id number]    Unit/Bed#: E5 -01 Encounter: [id number]    Prostate Ca  · POD # 3 RALP with BL pelvic lymph node dissection and lysis of adhesions   · Dove draining pale clear yellow urine, 1015 cc over last 24 hours   · ANTHONY serosanguinous drainage- outputs increasing  380 to 1500 cc    · Anthony fluid sent friday for creatinine 2 0  Outputs increased sent again 2/19 consistent with leak  > 40   · Dove placed on traction and additional 5 cc saline placed in balloon   · Tolerating advanced diet   · Encourage out of bed and ambulation        AYO  · Creatinine increasing  1 7--1 9--2 1--2 29  · Continue IVFs  · Nephrology following   · BMP ordered for 3 pm   · AM labs   · Avoid hypotension and nephrotoxic medications  Lisinopril on hold, toradol stopped yesterday       Subjective:   HPI: Patient is feeling generally well  Had two liquid bowel movements and now having less gas pain  Lower abdominal tenderness with movement and palpation  Tolerating diet, no nausea or vomiting  Objective:  Nursing Rounds: Didi Benjamin at bedside   Vitals: Blood pressure 142/91, pulse 85, temperature 97 9 °F (36 6 °C), resp  rate 16, height 5' 8" (1 727 m), weight 77 7 kg (171 lb 4 8 oz), SpO2 97 %  ,Body mass index is 26 05 kg/m²  Physical Exam  Vitals reviewed  Constitutional:       Appearance: He is obese  He is not ill-appearing  HENT:      Head: Normocephalic and atraumatic  Cardiovascular:      Rate and Rhythm: Normal rate  Pulmonary:      Effort: Pulmonary effort is normal    Abdominal:      General: Bowel sounds are normal  There is distension  Tenderness: There is abdominal tenderness (lower abdominal )  Genitourinary:     Penis: Normal        Comments: Dove draining yellow urine, on traction   Musculoskeletal:         General: Normal range of motion  Cervical back: Normal range of motion  Skin:     General: Skin is warm and dry  Neurological:      Mental Status: He is alert and oriented to person, place, and time  Psychiatric:         Mood and Affect: Mood normal          Behavior: Behavior normal          Thought Content: Thought content normal          Judgment: Judgment normal            Labs:  Recent Labs     22  1420 22  0517   WBC 10 65* 8 44       Recent Labs     22  1420 22  0517   HGB 13 5 11 3*     Recent Labs     22  1420 22  0517   HCT 39 3 32 6*     Recent Labs     22  1420 22  0517 22  1522 22  0504 22  1614 22  0527   CREATININE 1 52* 2 09* 1 71* 1 97* 2 19* 2 29*       History:    Past Medical History:   Diagnosis Date    Cancer [facility])     prostate  dx 2021    Colon polyp     Hearing aid worn     b/l    Hypertension     Prostate cancer ([address] )     Seasonal allergies     Wears glasses      Social History     Socioeconomic History    Marital status: /Civil Union     Spouse name: None    Number of children: None    Years of education: None    Highest education level: None   Occupational History    None   Tobacco Use    Smoking status: Former Smoker     Packs/day: 0 50     Years: 45 00     Pack years: 22 50     Quit date:      Years since quittin 1    Smokeless tobacco: Never Used   Vaping Use    Vaping Use: Never used   Substance and Sexual Activity    Alcohol use: Yes     Alcohol/week: 14 0 standard drinks     Types: 14 Standard drinks or equivalent per week     Comment: Beer and liquor daily-average 2-3 drinks/day       Drug use: Yes     Types: Marijuana     Comment: marijuana   smokes  1 x daily  LD 2022    Sexual activity: Not Currently   Other Topics Concern    None   Social History Narrative    None     Social Determinants of Health     Financial Resource Strain: Not on file   Food Insecurity: Not on file   Transportation Needs: Not on file   Physical Activity: Not on file   Stress: Not on file   Social Connections: Not on file   Intimate Partner Violence: Not on file   Housing Stability: Not on file     Past Surgical History:   Procedure Laterality Date    APPENDECTOMY      COLONOSCOPY      EGD      NV LAP,PROSTATECTOMY,RADICAL,W/NERVE SPARE,INCL ROBOTIC N/A 2/17/2022    Procedure: PROSTATECTOMY RADICAL LAPAROSCOPIC  W ROBOTICS, BPLND, LAPROSCOPIC LYSIS OF ADHESIONS;  Surgeon: Herberth Ortiz MD;  Location: [facility];  Service: Urology    PROSTATE BIOPSY      TONSILLECTOMY       History reviewed  No pertinent family history      DEMAR Escamilla  Date: 2/20/2022 Time: 8:02 AM

## 2022-02-20 NOTE — NURSING NOTE
It was noted that the pt's zay drain had a high amount of output and minimal drainage of Humphreys even with flushing Humphreys  Urology made aware  Orders to bladder scan and collect ZAY drainage to test for creatinine were completed  Urology was messaged again for high output of zay drain and that the bladder scanner showed no urine  Orders to keep an eye on humphreys output and flush PRN were sent to RN

## 2022-02-20 NOTE — PLAN OF CARE
Problem: Potential for Falls  Goal: Patient will remain free of falls  Description: INTERVENTIONS:  - Educate patient/family on patient safety including physical limitations  - Instruct patient to call for assistance with activity   - Consult OT/PT to assist with strengthening/mobility   - Keep Call bell within reach  - Keep bed low and locked with side rails adjusted as appropriate  - Keep care items and personal belongings within reach  - Initiate and maintain comfort rounds  - Make Fall Risk Sign visible to staff  - Offer Toileting every  Hours, in advance of need  - Initiate/Maintain alarm  - Obtain necessary fall risk management equipment:   - Apply yellow socks and bracelet for high fall risk patients  - Consider moving patient to room near nurses station  Outcome: Progressing     Problem: MOBILITY - ADULT  Goal: Maintain or return to baseline ADL function  Description: INTERVENTIONS:  -  Assess patient's ability to carry out ADLs; assess patient's baseline for ADL function and identify physical deficits which impact ability to perform ADLs (bathing, care of mouth/teeth, toileting, grooming, dressing, etc )  - Assess/evaluate cause of self-care deficits   - Assess range of motion  - Assess patient's mobility; develop plan if impaired  - Assess patient's need for assistive devices and provide as appropriate  - Encourage maximum independence but intervene and supervise when necessary  - Involve family in performance of ADLs  - Assess for home care needs following discharge   - Consider OT consult to assist with ADL evaluation and planning for discharge  - Provide patient education as appropriate  Outcome: Progressing  Goal: Maintains/Returns to pre admission functional level  Description: INTERVENTIONS:  - Perform BMAT or MOVE assessment daily    - Set and communicate daily mobility goal to care team and patient/family/caregiver     - Collaborate with rehabilitation services on mobility goals if consulted  - Perform Range of Motion  times a day  - Reposition patient every  hours    - Dangle patient  times a day  - Stand patient  times a day  - Ambulate patient  times a day  - Out of bed to chair  times a day   - Out of bed for meals  times a day  - Out of bed for toileting  - Record patient progress and toleration of activity level   Outcome: Progressing     Problem: PAIN - ADULT  Goal: Verbalizes/displays adequate comfort level or baseline comfort level  Description: Interventions:  - Encourage patient to monitor pain and request assistance  - Assess pain using appropriate pain scale  - Administer analgesics based on type and severity of pain and evaluate response  - Implement non-pharmacological measures as appropriate and evaluate response  - Consider cultural and social influences on pain and pain management  - Notify physician/advanced practitioner if interventions unsuccessful or patient reports new pain  Outcome: Progressing     Problem: INFECTION - ADULT  Goal: Absence or prevention of progression during hospitalization  Description: INTERVENTIONS:  - Assess and monitor for signs and symptoms of infection  - Monitor lab/diagnostic results  - Monitor all insertion sites, i e  indwelling lines, tubes, and drains  - Monitor endotracheal if appropriate and nasal secretions for changes in amount and color  - Yale appropriate cooling/warming therapies per order  - Administer medications as ordered  - Instruct and encourage patient and family to use good hand hygiene technique  - Identify and instruct in appropriate isolation precautions for identified infection/condition  Outcome: Progressing  Goal: Absence of fever/infection during neutropenic period  Description: INTERVENTIONS:  - Monitor WBC    Outcome: Progressing     Problem: SAFETY ADULT  Goal: Patient will remain free of falls  Description: INTERVENTIONS:  - Educate patient/family on patient safety including physical limitations  - Instruct patient to call for assistance with activity   - Consult OT/PT to assist with strengthening/mobility   - Keep Call bell within reach  - Keep bed low and locked with side rails adjusted as appropriate  - Keep care items and personal belongings within reach  - Initiate and maintain comfort rounds  - Make Fall Risk Sign visible to staff  - Offer Toileting every  Hours, in advance of need  - Initiate/Maintain alarm  - Obtain necessary fall risk management equipment:   - Apply yellow socks and bracelet for high fall risk patients  - Consider moving patient to room near nurses station  Outcome: Progressing  Goal: Maintain or return to baseline ADL function  Description: INTERVENTIONS:  -  Assess patient's ability to carry out ADLs; assess patient's baseline for ADL function and identify physical deficits which impact ability to perform ADLs (bathing, care of mouth/teeth, toileting, grooming, dressing, etc )  - Assess/evaluate cause of self-care deficits   - Assess range of motion  - Assess patient's mobility; develop plan if impaired  - Assess patient's need for assistive devices and provide as appropriate  - Encourage maximum independence but intervene and supervise when necessary  - Involve family in performance of ADLs  - Assess for home care needs following discharge   - Consider OT consult to assist with ADL evaluation and planning for discharge  - Provide patient education as appropriate  Outcome: Progressing  Goal: Maintains/Returns to pre admission functional level  Description: INTERVENTIONS:  - Perform BMAT or MOVE assessment daily    - Set and communicate daily mobility goal to care team and patient/family/caregiver  - Collaborate with rehabilitation services on mobility goals if consulted  - Perform Range of Motion  times a day  - Reposition patient every  hours    - Dangle patient  times a day  - Stand patient  times a day  - Ambulate patient  times a day  - Out of bed to chair  times a day   - Out of bed for meals times a day  - Out of bed for toileting  - Record patient progress and toleration of activity level   Outcome: Progressing     Problem: DISCHARGE PLANNING  Goal: Discharge to home or other facility with appropriate resources  Description: INTERVENTIONS:  - Identify barriers to discharge w/patient and caregiver  - Arrange for needed discharge resources and transportation as appropriate  - Identify discharge learning needs (meds, wound care, etc )  - Arrange for interpretive services to assist at discharge as needed  - Refer to Case Management Department for coordinating discharge planning if the patient needs post-hospital services based on physician/advanced practitioner order or complex needs related to functional status, cognitive ability, or social support system  Outcome: Progressing     Problem: Knowledge Deficit  Goal: Patient/family/caregiver demonstrates understanding of disease process, treatment plan, medications, and discharge instructions  Description: Complete learning assessment and assess knowledge base    Interventions:  - Provide teaching at level of understanding  - Provide teaching via preferred learning methods  Outcome: Progressing

## 2022-02-20 NOTE — PROGRESS NOTES
NEPHROLOGY PROGRESS NOTE   Boogie Perry 66 y o  male MRN: [id number]  Unit/Bed#: E5 -01 Encounter: [id number]  Reason for Consult: AYO      SUMMARY:    71-year-old male with recently diagnosed prostate cancer who was electively admitted to [facility] for prostatectomy   Nephrology consult for acute kidney injury     ASSESSMENT and PLAN:     Acute kidney injury  --postoperative creatinine 1 5 mg/dL, creatinine peaked at 2 09 mg/dL  --creatinine from January 12 2022 was 1 19 mg/dL, otherwise we have no other prior blood work to comment    --it should be noted that the patient underwent a CT scan with intravenous contrast on January 19th, unsure if that patient underwent contrast associated nephropathy at that time  --cannot fully comment on a baseline creatinine  --suspect acute kidney injury in the setting of hypotension, and fluctuations of blood pressure that may have also led to a component of acute tubular necrosis  --Dove catheter in place, nonoliguric  --may possibly have some underlying chronic kidney disease  --tolerating oral intake, now having acute diarrhea that started this morning and going proximally every 10-15 minutes watery diarrhea  --urinalysis showed large blood, Dove specimen, 10-20 RBCs, no protein  --reduce the rate of fluids to 75 cc/hour will maintain at this time given the diarrhea to prevent any further prerenal azotemia  --monitor urine output  --closely monitor volume status  --avoid any further contrast studies and avoid NSAIDs  --continue to hold lisinopril as you currently are  --maintain systolic blood pressure greater than 120  --his creatinine appears to be plateauing, as the rate of rise has started to slow  --check a BMP this afternoon and tomorrow morning     Hypokalemia--resolved     Blood pressure/volume status  --history of hypertension  --on lisinopril as an outpatient which has been currently on hold since February 16th  --reduce isolate to 75 cc/hour  --now having diarrhea this morning  --examined euvolemic  --fluctuations in blood pressure with episodes of hypotension and intra operative hypotension     Prostate cancer  --recently diagnosed  --elevated PSA  --status post radical laparoscopic prostatectomy on February 17th  --Dove catheter in place      SUBJECTIVE / INTERVAL HISTORY:    Having rather profuse diarrhea since 5:00 a m  This morning going multiple times and having watery diarrhea  Reports no blood  Reports good oral intake    OBJECTIVE:  Current Weight: Weight - Scale: 77 7 kg (171 lb 4 8 oz) (with clothing on)  Vitals:    02/19/22 2013 02/19/22 2319 02/20/22 0047 02/20/22 0732   BP: 140/97 151/95 128/89 142/91   BP Location:  Right arm     Pulse: 94 86 96 85   Resp: 18   16   Temp: (!) 97 3 °F (36 3 °C) 98 °F (36 7 °C)  97 9 °F (36 6 °C)   TempSrc:       SpO2: 97%  97% 97%   Weight:       Height:           Intake/Output Summary (Last 24 hours) at 2/20/2022 1007  Last data filed at 2/20/2022 0932  Gross per 24 hour   Intake 1900 ml   Output 2265 ml   Net -365 ml       Review of Systems:    12 point ROS has been reviewed  Physical Exam  Vitals and nursing note reviewed  Constitutional:       General: He is not in acute distress  Appearance: He is well-developed  He is not diaphoretic  HENT:      Head: Normocephalic and atraumatic  Eyes:      General: No scleral icterus  Pupils: Pupils are equal, round, and reactive to light  Cardiovascular:      Rate and Rhythm: Normal rate  Pulmonary:      Effort: Pulmonary effort is normal  No respiratory distress  Abdominal:      General: Bowel sounds are normal  There is no distension  Palpations: Abdomen is soft  Tenderness: There is no abdominal tenderness  There is no rebound  Musculoskeletal:         General: Normal range of motion  Cervical back: Normal range of motion and neck supple  Skin:     Findings: No rash     Neurological:      Mental Status: He is alert and oriented to person, place, and time  Mental status is at baseline  Psychiatric:         Mood and Affect: Mood normal          Behavior: Behavior normal          Thought Content:  Thought content normal          Judgment: Judgment normal          Medications:    Current Facility-Administered Medications:     acetaminophen (TYLENOL) tablet 650 mg, 650 mg, Oral, Q6H [address], Robert Das MD, 650 mg at 02/20/22 0514    calcium carbonate (TUMS) chewable tablet 500 mg, 500 mg, Oral, Daily PRN, Claudetta Repine, PA-C, 500 mg at 02/19/22 0455    docusate sodium (COLACE) capsule 100 mg, 100 mg, Oral, BID, Usha Katz, CRNP, 100 mg at 02/19/22 1712    enoxaparin (LOVENOX) subcutaneous injection 30 mg, 30 mg, Subcutaneous, Q24H [address], Usha Katz CRNP, 30 mg at 02/19/22 2319    gabapentin (NEURONTIN) capsule 300 mg, 300 mg, Oral, HS, Robert Das MD, 300 mg at 02/19/22 2319    multi-electrolyte (PLASMALYTE-A/ISOLYTE-S PH 7 4) IV solution, 75 mL/hr, Intravenous, Continuous, Jluian Alexis MD, Last Rate: 100 mL/hr at 02/20/22 0514, 100 mL/hr at 02/20/22 0514    neomycin-bacitracin-polymyxin b (NEOSPORIN) ointment 1 small application, 1 small application, Topical, BID, Robert Das MD, 1 small application at 37/55/31 0940    ondansetron [facility]) injection 4 mg, 4 mg, Intravenous, Q6H PRN, Robert Das MD    oxyCODONE (ROXICODONE) IR tablet 2 5 mg, 2 5 mg, Oral, Q4H PRN, Robert Das MD    oxyCODONE (ROXICODONE) IR tablet 5 mg, 5 mg, Oral, Q4H PRN, Robert Das MD    [facility]) tablet 8 6 mg, 1 tablet, Oral, HS PRN, CHRIS CantrellNP, 8 6 mg at 02/19/22 2319    simethicone (MYLICON) chewable tablet 80 mg, 80 mg, Oral, Q6H PRN, Claudetta Repine, PA-C, 80 mg at 02/19/22 4280    Laboratory Results:  Results from last 7 days   Lab Units 02/20/22  0928 02/20/22  0527 02/19/22  1614 02/19/22  0504 02/18/22  1522 02/18/22  0517 02/17/22  1420 02/14/22  1139   WBC Thousand/uL 13 84*  --   --   --   --  8 44 10 65* 7 09   HEMOGLOBIN g/dL 11 1*  --   --   --   --  11 3* 13 5 15 0   HEMATOCRIT % 32 1*  --   --   --   --  32 6* 39 3 43 0   PLATELETS Thousands/uL 159  --   --   --   --  130* 151 196   POTASSIUM mmol/L  --  3 7 4 0 3 4* 3 6 3 3* 3 6  --    CHLORIDE mmol/L  --  101 102 102 101 101 101  --    CO2 mmol/L  --  24 27 25 23 26 24  --    BUN mg/dL  --  23 21 20 22 21 22  --    CREATININE mg/dL  --  2 29* 2 19* 1 97* 1 71* 2 09* 1 52*  --    CALCIUM mg/dL  --  7 6* 7 6* 7 2* 7 2* 7 4* 8 5  --    MAGNESIUM mg/dL  --   --   --  1 3*  --   --   --   --

## 2022-02-20 NOTE — PLAN OF CARE
Problem: Potential for Falls  Goal: Patient will remain free of falls  Description: INTERVENTIONS:  - Educate patient/family on patient safety including physical limitations  - Instruct patient to call for assistance with activity   - Consult OT/PT to assist with strengthening/mobility   - Keep Call bell within reach  - Keep bed low and locked with side rails adjusted as appropriate  - Keep care items and personal belongings within reach  - Initiate and maintain comfort rounds  - Make Fall Risk Sign visible to staff  - Offer Toileting every 2 Hours, in advance of need  - Initiate/Maintain bed alarm  - Obtain necessary fall risk management equipment: socks   - Apply yellow socks and bracelet for high fall risk patients  - Consider moving patient to room near nurses station  Outcome: Progressing     Problem: MOBILITY - ADULT  Goal: Maintain or return to baseline ADL function  Description: INTERVENTIONS:  -  Assess patient's ability to carry out ADLs; assess patient's baseline for ADL function and identify physical deficits which impact ability to perform ADLs (bathing, care of mouth/teeth, toileting, grooming, dressing, etc )  - Assess/evaluate cause of self-care deficits   - Assess range of motion  - Assess patient's mobility; develop plan if impaired  - Assess patient's need for assistive devices and provide as appropriate  - Encourage maximum independence but intervene and supervise when necessary  - Involve family in performance of ADLs  - Assess for home care needs following discharge   - Consider OT consult to assist with ADL evaluation and planning for discharge  - Provide patient education as appropriate  Outcome: Progressing  Goal: Maintains/Returns to pre admission functional level  Description: INTERVENTIONS:  - Perform BMAT or MOVE assessment daily    - Set and communicate daily mobility goal to care team and patient/family/caregiver     - Collaborate with rehabilitation services on mobility goals if consulted  - Perform Range of Motion 3 times a day  - Reposition patient every 2 hours    - Dangle patient 3 times a day  - Stand patient 3 times a day  - Ambulate patient 3 times a day  - Out of bed to chair 3 times a day   - Out of bed for meals 3 times a day  - Out of bed for toileting  - Record patient progress and toleration of activity level   Outcome: Progressing     Problem: PAIN - ADULT  Goal: Verbalizes/displays adequate comfort level or baseline comfort level  Description: Interventions:  - Encourage patient to monitor pain and request assistance  - Assess pain using appropriate pain scale  - Administer analgesics based on type and severity of pain and evaluate response  - Implement non-pharmacological measures as appropriate and evaluate response  - Consider cultural and social influences on pain and pain management  - Notify physician/advanced practitioner if interventions unsuccessful or patient reports new pain  Outcome: Progressing     Problem: INFECTION - ADULT  Goal: Absence or prevention of progression during hospitalization  Description: INTERVENTIONS:  - Assess and monitor for signs and symptoms of infection  - Monitor lab/diagnostic results  - Monitor all insertion sites, i e  indwelling lines, tubes, and drains  - Monitor endotracheal if appropriate and nasal secretions for changes in amount and color  - [place] appropriate cooling/warming therapies per order  - Administer medications as ordered  - Instruct and encourage patient and family to use good hand hygiene technique  - Identify and instruct in appropriate isolation precautions for identified infection/condition  Outcome: Progressing  Goal: Absence of fever/infection during neutropenic period  Description: INTERVENTIONS:  - Monitor WBC    Outcome: Progressing     Problem: SAFETY ADULT  Goal: Patient will remain free of falls  Description: INTERVENTIONS:  - Educate patient/family on patient safety including physical limitations  - Instruct patient to call for assistance with activity   - Consult OT/PT to assist with strengthening/mobility   - Keep Call bell within reach  - Keep bed low and locked with side rails adjusted as appropriate  - Keep care items and personal belongings within reach  - Initiate and maintain comfort rounds  - Make Fall Risk Sign visible to staff  - Offer Toileting every 2 Hours, in advance of need  - Initiate/Maintain bed alarm  - Obtain necessary fall risk management equipment: socks   - Apply yellow socks and bracelet for high fall risk patients  - Consider moving patient to room near nurses station  Outcome: Progressing  Goal: Maintain or return to baseline ADL function  Description: INTERVENTIONS:  -  Assess patient's ability to carry out ADLs; assess patient's baseline for ADL function and identify physical deficits which impact ability to perform ADLs (bathing, care of mouth/teeth, toileting, grooming, dressing, etc )  - Assess/evaluate cause of self-care deficits   - Assess range of motion  - Assess patient's mobility; develop plan if impaired  - Assess patient's need for assistive devices and provide as appropriate  - Encourage maximum independence but intervene and supervise when necessary  - Involve family in performance of ADLs  - Assess for home care needs following discharge   - Consider OT consult to assist with ADL evaluation and planning for discharge  - Provide patient education as appropriate  Outcome: Progressing  Goal: Maintains/Returns to pre admission functional level  Description: INTERVENTIONS:  - Perform BMAT or MOVE assessment daily    - Set and communicate daily mobility goal to care team and patient/family/caregiver  - Collaborate with rehabilitation services on mobility goals if consulted  - Perform Range of Motion 4 times a day  - Reposition patient every 2 hours    - Dangle patient 3 times a day  - Stand patient 3 times a day  - Ambulate patient 3 times a day  - Out of bed to chair 3 times a day   - Out of bed for meals 3 times a day  - Out of bed for toileting  - Record patient progress and toleration of activity level   Outcome: Progressing     Problem: DISCHARGE PLANNING  Goal: Discharge to home or other facility with appropriate resources  Description: INTERVENTIONS:  - Identify barriers to discharge w/patient and caregiver  - Arrange for needed discharge resources and transportation as appropriate  - Identify discharge learning needs (meds, wound care, etc )  - Arrange for interpretive services to assist at discharge as needed  - Refer to Case Management Department for coordinating discharge planning if the patient needs post-hospital services based on physician/advanced practitioner order or complex needs related to functional status, cognitive ability, or social support system  Outcome: Progressing     Problem: Knowledge Deficit  Goal: Patient/family/caregiver demonstrates understanding of disease process, treatment plan, medications, and discharge instructions  Description: Complete learning assessment and assess knowledge base    Interventions:  - Provide teaching at level of understanding  - Provide teaching via preferred learning methods  Outcome: Progressing

## 2022-02-21 LAB
ANION GAP SERPL CALCULATED.3IONS-SCNC: 9 MMOL/L (ref 4–13)
BUN SERPL-MCNC: 18 MG/DL (ref 5–25)
CALCIUM SERPL-MCNC: 7.7 MG/DL (ref 8.3–10.1)
CHLORIDE SERPL-SCNC: 102 MMOL/L (ref 100–108)
CO2 SERPL-SCNC: 26 MMOL/L (ref 21–32)
CREAT FLD-MCNC: >40 MG/DL
CREAT SERPL-MCNC: 1.52 MG/DL (ref 0.6–1.3)
GFR SERPL CREATININE-BSD FRML MDRD: 45 ML/MIN/1.73SQ M
GLUCOSE SERPL-MCNC: 88 MG/DL (ref 65–140)
POTASSIUM SERPL-SCNC: 3.8 MMOL/L (ref 3.5–5.3)
SODIUM SERPL-SCNC: 137 MMOL/L (ref 136–145)

## 2022-02-21 PROCEDURE — 80048 BASIC METABOLIC PNL TOTAL CA: CPT | Performed by: INTERNAL MEDICINE

## 2022-02-21 PROCEDURE — 82570 ASSAY OF URINE CREATININE: CPT | Performed by: NURSE PRACTITIONER

## 2022-02-21 PROCEDURE — 99232 SBSQ HOSP IP/OBS MODERATE 35: CPT | Performed by: INTERNAL MEDICINE

## 2022-02-21 PROCEDURE — 99024 POSTOP FOLLOW-UP VISIT: CPT

## 2022-02-21 RX ORDER — LANOLIN ALCOHOL/MO/W.PET/CERES
3 CREAM (GRAM) TOPICAL
Status: DISCONTINUED | OUTPATIENT
Start: 2022-02-21 | End: 2022-02-23 | Stop reason: HOSPADM

## 2022-02-21 RX ADMIN — OXYCODONE HYDROCHLORIDE 5 MG: 5 TABLET ORAL at 18:03

## 2022-02-21 RX ADMIN — ACETAMINOPHEN 650 MG: 325 TABLET, FILM COATED ORAL at 17:57

## 2022-02-21 RX ADMIN — SIMETHICONE 80 MG: 80 TABLET, CHEWABLE ORAL at 15:20

## 2022-02-21 RX ADMIN — HYDROMORPHONE HYDROCHLORIDE 0.5 MG: 1 INJECTION, SOLUTION INTRAMUSCULAR; INTRAVENOUS; SUBCUTANEOUS at 07:41

## 2022-02-21 RX ADMIN — ACETAMINOPHEN 650 MG: 325 TABLET, FILM COATED ORAL at 23:55

## 2022-02-21 RX ADMIN — MELATONIN 3 MG: at 23:55

## 2022-02-21 RX ADMIN — SIMETHICONE 80 MG: 80 TABLET, CHEWABLE ORAL at 22:36

## 2022-02-21 RX ADMIN — ACETAMINOPHEN 650 MG: 325 TABLET, FILM COATED ORAL at 11:05

## 2022-02-21 RX ADMIN — SIMETHICONE 80 MG: 80 TABLET, CHEWABLE ORAL at 03:43

## 2022-02-21 RX ADMIN — ENOXAPARIN SODIUM 30 MG: 30 INJECTION, SOLUTION INTRAVENOUS; SUBCUTANEOUS at 22:02

## 2022-02-21 RX ADMIN — SIMETHICONE 80 MG: 80 TABLET, CHEWABLE ORAL at 11:06

## 2022-02-21 RX ADMIN — GABAPENTIN 300 MG: 300 CAPSULE ORAL at 22:12

## 2022-02-21 RX ADMIN — BACITRACIN, NEOMYCIN, POLYMYXIN B 1 SMALL APPLICATION: 400; 3.5; 5 OINTMENT TOPICAL at 17:57

## 2022-02-21 RX ADMIN — BACITRACIN, NEOMYCIN, POLYMYXIN B 1 SMALL APPLICATION: 400; 3.5; 5 OINTMENT TOPICAL at 09:44

## 2022-02-21 RX ADMIN — ACETAMINOPHEN 650 MG: 325 TABLET, FILM COATED ORAL at 05:22

## 2022-02-21 RX ADMIN — OXYCODONE HYDROCHLORIDE 2.5 MG: 5 TABLET ORAL at 03:43

## 2022-02-21 NOTE — PROGRESS NOTES
Follow up Consultation    Nephrology   Melissa Velásquez 66 y o  male MRN: [id number]  Unit/Bed#: E5 -01 Encounter: [id number]      Physician Requesting Consult: Marisabel Crespo MD        ASSESSMENT/PLAN:  76-year-old male with multiple comorbidities including prostate cancer admitted for elective prostatectomy postoperatively with acute kidney injury nephrology following  Acute kidney injury :  AYO most likely secondary to MAGEN (01/17/2022) plus failure to auto regulate presence of hemodynamic perturbations intraoperatively with subsequent ATN  After review of records In [facility] as well as Care everywhere it appears that the patient has a baseline Creatinine of 1 2 mg/dL  Preoperatively  Other than this no other lab works  patient was admitted with a creatinine of 1 52 mg/dL on 02/17/2022  Peak creatinine this hospitalization thus far at 2 29 mg/dL on 02/20/2022  patient's creatinine today is at 1 52 mg/dL, stable and continues to improve  DC Plasmalyte at 50 cc an hour at midnight tonight  check BMP in a m  Await renal recovery  Optimize hemodynamic status to avoid delay in renal recovery  Place on a renal diet when allowed diet order  Avoid nephrotoxins, adjust meds to appropriate GFR  Strict I/O  Daily weights  Urinary retention protocol if patient does not have a Dove  will need to set up patient for follow up with Nephrology as an outpatient post hospitalization  for nephrology as an outpatient patient follows up with no nephrologist  Stable for discharge from renal standpoint if medically cleared in the next 24 hours if creatinine continues to improve  Will need arrangements for outpatient blood work and close follow-up  Blood pressure/hypertension:  current medications:  No meds remains stable  recommendations:  Hold lisinopril for now  Optimize hemodynamics  Maintain MAP > 65mmHg  Avoid BP fluctuations      H/H/anemia:  most recent hemoglobin at 11 1 grams/deciliter  maintain hemoglobin greater than 8 grams/deciliter    Acid-base electrolytes:    Electrolytes:    Stable    Acid-base:    Most recent bicarb stable at 26    Other medical problems:  Proteinuria: Most recent UA with no protein as of 2022  Prostate cancer:  Management per primary team   Follow-up with Urology status post radical laparoscopic prostatectomy on 2022  Dove remains in place  Thanks for the consult  Will continue to follow  Please call with questions/ concerns  Above-mentioned orders and Plan in terms of acute kidney injury was discussed with the team in [address] London[address] MD, FASN, 2022, 12:06 PM              Objective :   Patient seen and examined in his room no overnight events hemodynamically stable remains afebrile urine output close to 1 5 L last 24 hours  Happy to hear renal parameters are improving  No further diarrhea  PHYSICAL EXAM  /90   Pulse 87   Temp 98 °F (36 7 °C)   Resp 17   Ht 5' 8" (1 727 m)   Wt 77 7 kg (171 lb 4 8 oz) Comment: with clothing on  SpO2 99%   BMI 26 05 kg/m²   Temp (24hrs), Av 1 °F (36 7 °C), Min:97 7 °F (36 5 °C), Max:98 7 °F (37 1 °C)        Intake/Output Summary (Last 24 hours) at 2022 1206  Last data filed at 2022 1000  Gross per 24 hour   Intake --   Output 1115 ml   Net -1115 ml       I/O last 24 hours: In: -   Out: 3691 [Urine:200; Drains:1445]      Current Weight: Weight - Scale: 77 7 kg (171 lb 4 8 oz) (with clothing on)  First Weight: Weight - Scale: 77 7 kg (171 lb 4 8 oz) (with clothing on)  Physical Exam  Vitals and nursing note reviewed  Constitutional:       General: He is not in acute distress  Appearance: Normal appearance  He is normal weight  He is not ill-appearing, toxic-appearing or diaphoretic  HENT:      Mouth/Throat:      Mouth: Mucous membranes are moist       Pharynx: Oropharynx is clear  No oropharyngeal exudate or posterior oropharyngeal erythema     Eyes:      General: No scleral icterus  Conjunctiva/sclera: Conjunctivae normal    Cardiovascular:      Rate and Rhythm: Normal rate  Heart sounds: Normal heart sounds  No friction rub  Pulmonary:      Effort: Pulmonary effort is normal  No respiratory distress  Breath sounds: Normal breath sounds  No stridor  No wheezing  Abdominal:      General: There is no distension  Palpations: Abdomen is soft  There is no mass  Tenderness: There is no abdominal tenderness  Musculoskeletal:         General: No swelling  Cervical back: Normal range of motion and neck supple  No rigidity  Skin:     General: Skin is warm  Coloration: Skin is not jaundiced  Neurological:      General: No focal deficit present  Mental Status: He is alert and oriented to person, place, and time  Psychiatric:         Mood and Affect: Mood normal          Behavior: Behavior normal              Review of Systems   Constitutional: Positive for appetite change  Negative for chills, fatigue and fever  HENT: Negative for congestion  Respiratory: Negative for cough, shortness of breath and wheezing  Cardiovascular: Negative for leg swelling  Gastrointestinal: Negative for abdominal pain, diarrhea, nausea and vomiting  Musculoskeletal: Negative for back pain  Skin: Negative for rash  Neurological: Negative for headaches  Psychiatric/Behavioral: Negative for agitation and confusion  All other systems reviewed and are negative        Scheduled Meds:  Current Facility-Administered Medications   Medication Dose Route Frequency Provider Last Rate    acetaminophen  650 mg Oral Q6H [address] Mukesh Hernandez MD      calcium carbonate  500 mg Oral Daily PRN Slick Antunez PA-C      docusate sodium  100 mg Oral BID DEMAR Dwyer      enoxaparin  30 mg Subcutaneous Q24H [address] DEMAR Dwyer      gabapentin  300 mg Oral HS Mukesh Hernandez MD      HYDROmorphone  0 5 mg Intravenous Q6H PRN Karlene Dumont DEMAR Melchor      multi-electrolyte  75 mL/hr Intravenous Continuous Rd Lerma MD 75 mL/hr (02/20/22 1117)    neomycin-bacitracin-polymyxin b  1 small application Topical BID Niru Paige MD      ondansetron  4 mg Intravenous Q6H PRN Niru Paige MD      oxyCODONE  2 5 mg Oral Q4H PRN Niru Paige MD      oxyCODONE  5 mg Oral Q4H PRN Niru Paige MD      senna  1 tablet Oral HS PRN DEMAR Cole      simethicone  80 mg Oral Q6H PRN Sophie Rangel PA-C         PRN Meds: calcium carbonate    HYDROmorphone    ondansetron    oxyCODONE    oxyCODONE    senna    simethicone    Continuous Infusions:multi-electrolyte, 75 mL/hr, Last Rate: 75 mL/hr (02/20/22 1117)          Invasive Devices: Invasive Devices  Report    Peripheral Intravenous Line            Peripheral IV 02/21/22 Dorsal (posterior); Right Hand <1 day          Drain            Closed/Suction Drain Lateral;Left LLQ Bulb 10 Fr  4 days    Urethral Catheter Non-latex 20 Fr  4 days                  LABORATORY:    Results from last 7 days   Lab Units 02/21/22  0614 02/20/22  1516 02/20/22  0928 02/20/22  0527 02/19/22  1614 02/19/22  0504 02/18/22  1522 02/18/22  0517 02/17/22  1420 02/17/22  1420   WBC Thousand/uL  --   --  13 84*  --   --   --   --  8 44  --  10 65*   HEMOGLOBIN g/dL  --   --  11 1*  --   --   --   --  11 3*  --  13 5   HEMATOCRIT %  --   --  32 1*  --   --   --   --  32 6*  --  39 3   PLATELETS Thousands/uL  --   --  159  --   --   --   --  130*  --  151   POTASSIUM mmol/L 3 8 3 7  --  3 7 4 0 3 4* 3 6 3 3*   < > 3 6   CHLORIDE mmol/L 102 102  --  101 102 102 101 101   < > 101   CO2 mmol/L 26 27  --  24 27 25 23 26   < > 24   BUN mg/dL 18 19  --  23 21 20 22 21   < > 22   CREATININE mg/dL 1 52* 1 80*  --  2 29* 2 19* 1 97* 1 71* 2 09*   < > 1 52*   CALCIUM mg/dL 7 7* 7 5*  --  7 6* 7 6* 7 2* 7 2* 7 4*   < > 8 5   MAGNESIUM mg/dL  --   --   --   --   --  1 3*  --   --   --   -- < > = values in this interval not displayed  rest all reviewed    RADIOLOGY:  No orders to display     Rest all reviewed    Portions of the record may have been created with voice recognition software  Occasional wrong word or "sound a like" substitutions may have occurred due to the inherent limitations of voice recognition software  Read the chart carefully and recognize, using context, where substitutions have occurred  If you have any questions, please contact the dictating provider

## 2022-02-21 NOTE — ASSESSMENT & PLAN NOTE
· POD#6 RALP with BPLND and MAT  · New fenestrated Humphreys catheter output excellent 800ml overnight  · Pelvic drain to gravity- output lessening, 450ml overnight  · pelvid drain fluid Creatinine >40 consistent with suspected anastomotic urine leak; expect will heal on its own over time  Monitoring outputs humphreys vs pelvic drain over the next week or two  Both stay in place on discharge as is  · AYO resolved; peak 2 29, current 1 10  · Resume lisinopril for HTN  · BMP in 1 week after discharge  · Followup nephrology in office setting  · Erythema of right hip/aneterior thigh suspicious of cellulitis  Picture documented in Media section  IV Ancef ordered  Resolved overnight  Will continue PO keflex at discharge  · Doing well with ambulation  Incentive Spirometry, and DVT prophylaxis

## 2022-02-21 NOTE — PLAN OF CARE
Problem: Potential for Falls  Goal: Patient will remain free of falls  Description: INTERVENTIONS:  - Educate patient/family on patient safety including physical limitations  - Instruct patient to call for assistance with activity   - Consult OT/PT to assist with strengthening/mobility   - Keep Call bell within reach  - Keep bed low and locked with side rails adjusted as appropriate  - Keep care items and personal belongings within reach  - Initiate and maintain comfort rounds  - Make Fall Risk Sign visible to staff  - Offer Toileting every 2 Hours, in advance of need  - Initiate/Maintain bed alarm  - Obtain necessary fall risk management equipment: socks   - Apply yellow socks and bracelet for high fall risk patients  - Consider moving patient to room near nurses station  Outcome: Progressing     Problem: MOBILITY - ADULT  Goal: Maintain or return to baseline ADL function  Description: INTERVENTIONS:  -  Assess patient's ability to carry out ADLs; assess patient's baseline for ADL function and identify physical deficits which impact ability to perform ADLs (bathing, care of mouth/teeth, toileting, grooming, dressing, etc )  - Assess/evaluate cause of self-care deficits   - Assess range of motion  - Assess patient's mobility; develop plan if impaired  - Assess patient's need for assistive devices and provide as appropriate  - Encourage maximum independence but intervene and supervise when necessary  - Involve family in performance of ADLs  - Assess for home care needs following discharge   - Consider OT consult to assist with ADL evaluation and planning for discharge  - Provide patient education as appropriate  Outcome: Progressing  Goal: Maintains/Returns to pre admission functional level  Description: INTERVENTIONS:  - Perform BMAT or MOVE assessment daily    - Set and communicate daily mobility goal to care team and patient/family/caregiver     - Collaborate with rehabilitation services on mobility goals if consulted  - Perform Range of Motion 4 times a day  - Reposition patient every 2 hours    - Dangle patient 3 times a day  - Stand patient 3 times a day  - Ambulate patient 3 times a day  - Out of bed to chair 3 times a day   - Out of bed for meals 3 times a day  - Out of bed for toileting  - Record patient progress and toleration of activity level   Outcome: Progressing     Problem: PAIN - ADULT  Goal: Verbalizes/displays adequate comfort level or baseline comfort level  Description: Interventions:  - Encourage patient to monitor pain and request assistance  - Assess pain using appropriate pain scale  - Administer analgesics based on type and severity of pain and evaluate response  - Implement non-pharmacological measures as appropriate and evaluate response  - Consider cultural and social influences on pain and pain management  - Notify physician/advanced practitioner if interventions unsuccessful or patient reports new pain  Outcome: Progressing     Problem: INFECTION - ADULT  Goal: Absence or prevention of progression during hospitalization  Description: INTERVENTIONS:  - Assess and monitor for signs and symptoms of infection  - Monitor lab/diagnostic results  - Monitor all insertion sites, i e  indwelling lines, tubes, and drains  - Monitor endotracheal if appropriate and nasal secretions for changes in amount and color  - [place] appropriate cooling/warming therapies per order  - Administer medications as ordered  - Instruct and encourage patient and family to use good hand hygiene technique  - Identify and instruct in appropriate isolation precautions for identified infection/condition  Outcome: Progressing  Goal: Absence of fever/infection during neutropenic period  Description: INTERVENTIONS:  - Monitor WBC    Outcome: Progressing     Problem: SAFETY ADULT  Goal: Patient will remain free of falls  Description: INTERVENTIONS:  - Educate patient/family on patient safety including physical limitations  - Instruct patient to call for assistance with activity   - Consult OT/PT to assist with strengthening/mobility   - Keep Call bell within reach  - Keep bed low and locked with side rails adjusted as appropriate  - Keep care items and personal belongings within reach  - Initiate and maintain comfort rounds  - Make Fall Risk Sign visible to staff  - Offer Toileting every 2 Hours, in advance of need  - Initiate/Maintain bed alarm  - Obtain necessary fall risk management equipment: socks   - Apply yellow socks and bracelet for high fall risk patients  - Consider moving patient to room near nurses station  Outcome: Progressing  Goal: Maintain or return to baseline ADL function  Description: INTERVENTIONS:  -  Assess patient's ability to carry out ADLs; assess patient's baseline for ADL function and identify physical deficits which impact ability to perform ADLs (bathing, care of mouth/teeth, toileting, grooming, dressing, etc )  - Assess/evaluate cause of self-care deficits   - Assess range of motion  - Assess patient's mobility; develop plan if impaired  - Assess patient's need for assistive devices and provide as appropriate  - Encourage maximum independence but intervene and supervise when necessary  - Involve family in performance of ADLs  - Assess for home care needs following discharge   - Consider OT consult to assist with ADL evaluation and planning for discharge  - Provide patient education as appropriate  Outcome: Progressing  Goal: Maintains/Returns to pre admission functional level  Description: INTERVENTIONS:  - Perform BMAT or MOVE assessment daily    - Set and communicate daily mobility goal to care team and patient/family/caregiver  - Collaborate with rehabilitation services on mobility goals if consulted  - Perform Range of Motion 4 times a day  - Reposition patient every 2 hours    - Dangle patient 3 times a day  - Stand patient 3 times a day  - Ambulate patient 3 times a day  - Out of bed to chair 3 times a day   - Out of bed for meals 3 times a day  - Out of bed for toileting  - Record patient progress and toleration of activity level   Outcome: Progressing     Problem: DISCHARGE PLANNING  Goal: Discharge to home or other facility with appropriate resources  Description: INTERVENTIONS:  - Identify barriers to discharge w/patient and caregiver  - Arrange for needed discharge resources and transportation as appropriate  - Identify discharge learning needs (meds, wound care, etc )  - Arrange for interpretive services to assist at discharge as needed  - Refer to Case Management Department for coordinating discharge planning if the patient needs post-hospital services based on physician/advanced practitioner order or complex needs related to functional status, cognitive ability, or social support system  Outcome: Progressing     Problem: Knowledge Deficit  Goal: Patient/family/caregiver demonstrates understanding of disease process, treatment plan, medications, and discharge instructions  Description: Complete learning assessment and assess knowledge base    Interventions:  - Provide teaching at level of understanding  - Provide teaching via preferred learning methods  Outcome: Progressing

## 2022-02-21 NOTE — PROGRESS NOTES
Progress Note - Urology  Addy Veliz 1950, 66 y o  male MRN: [id number]    Unit/Bed#: E5 -01 Encounter: [id number]    * Prostate cancer ([address] )  Assessment & Plan  · POD# 4 RALP with BL pelvic lymph node dissection and lysis of adhesions  · Urine humphreys draining to traction with clear yellow drainage  Total output in 12 hours is 75mL  · WANDA drain, total drainage for 12 hours 585mL, serosanguinous drainage  · Drainage tube switched from bulb suction to gravity drainage  · WANDA fluid Creatinine >40 consistent with suspected anastomotic urine leak  · AYO improving  Creatinine 1 52 today previously 1 80, 2 29  · 8/10 suprapubic abdominal pain this AM controlled with IV dilaudid  · Dependent scrotal edema  · Tolerating regular diet, passing gas  · Encouraged ambulation  Incentive Spirometry, and DVT prophylaxis  · Discharge planning- within next 24 hours  Subjective:   HPI: He is feeling better today  He did have 8/10 suprapubic abdominal pain this morning that was controlled with IV dilaudid  Humphreys catheter was flushed to ensure patency, currently draining to traction with clear yellow urine  He is tolerating his regular diet, no nausea or vomiting  I encouraged him to ambulate  Review of Systems   Constitutional: Negative for chills and fever  HENT: Negative for ear pain and sore throat  Eyes: Negative for pain and visual disturbance  Respiratory: Negative for cough and shortness of breath  Cardiovascular: Negative for chest pain and palpitations  Gastrointestinal: Positive for abdominal pain (suprapubic 8/10) and diarrhea  Negative for constipation, nausea and vomiting  Gas   Genitourinary: Negative for dysuria and hematuria  Musculoskeletal: Negative for arthralgias and back pain  Skin: Negative for color change and rash  Neurological: Negative for seizures and syncope  All other systems reviewed and are negative        Objective:  Nursing Rounds:  Vitals: Blood pressure 144/90, pulse 87, temperature 98 °F (36 7 °C), resp  rate 17, height 5' 8" (1 727 m), weight 77 7 kg (171 lb 4 8 oz), SpO2 99 %  ,Body mass index is 26 05 kg/m²  Physical Exam  Vitals reviewed  Constitutional:       Appearance: Normal appearance  HENT:      Head: Normocephalic and atraumatic  Nose: Nose normal       Mouth/Throat:      Mouth: Mucous membranes are dry  Pharynx: Oropharynx is clear  Eyes:      General: No scleral icterus  Extraocular Movements: Extraocular movements intact  Conjunctiva/sclera: Conjunctivae normal    Cardiovascular:      Rate and Rhythm: Normal rate and regular rhythm  Pulses: Normal pulses  Heart sounds: Normal heart sounds  Pulmonary:      Effort: Pulmonary effort is normal  No respiratory distress  Breath sounds: Normal breath sounds  Abdominal:      General: Abdomen is flat  There is distension  Palpations: Abdomen is soft  Tenderness: There is abdominal tenderness (generalized periumbilical )  There is no right CVA tenderness or left CVA tenderness  Hernia: No hernia is present  Genitourinary:     Penis: Normal        Testes: Normal       Comments: Dependent srotal edema noted  Dove draining clear yellow urine  WANDA drain draining serosanguineous drainage  Musculoskeletal:         General: Normal range of motion  Cervical back: Normal range of motion  No tenderness  Right lower leg: No edema  Left lower leg: No edema  Skin:     General: Skin is warm and dry  Coloration: Skin is not jaundiced or pale  Neurological:      General: No focal deficit present  Mental Status: He is alert and oriented to person, place, and time  Mental status is at baseline  Gait: Gait normal    Psychiatric:         Mood and Affect: Mood normal          Behavior: Behavior normal          Thought Content:  Thought content normal          Judgment: Judgment normal          Labs:  Recent Labs 22  0928   WBC 13 84*       Recent Labs     22  0928   HGB 11 1*     Recent Labs     22  0928   HCT 32 1*     Recent Labs     22  1522 22  0504 22  1614 22  0527 22  1516 22  0614   CREATININE 1 71* 1 97* 2 19* 2 29* 1 80* 1 52*       History:    Past Medical History:   Diagnosis Date    Cancer [facility])     prostate  dx 2021    Colon polyp     Hearing aid worn     b/l    Hypertension     [facility] [facility] [address] [address]     Seasonal allergies     Wears glasses      Social History     Socioeconomic History    Marital status: /Civil Union     Spouse name: None    Number of children: None    Years of education: None    Highest education level: None   Occupational History    None   Tobacco Use    Smoking status: Former Smoker     Packs/day: 0 50     Years: 45 00     Pack years: 22 50     Quit date:      Years since quittin 1    Smokeless tobacco: Never Used   Vaping Use    Vaping Use: Never used   Substance and Sexual Activity    Alcohol use: Yes     Alcohol/week: 14 0 standard drinks     Types: 14 Standard drinks or equivalent per week     Comment: Beer and liquor daily-average 2-3 drinks/day       Drug use: Yes     Types: Marijuana     Comment: marijuana   smokes  1 x daily  LD 2022    Sexual activity: Not Currently   Other Topics Concern    None   Social History Narrative    None     Social Determinants of Health     Financial Resource Strain: Not on file   Food Insecurity: Not on file   Transportation Needs: Not on file   Physical Activity: Not on file   Stress: Not on file   Social Connections: Not on file   Intimate Partner Violence: Not on file   Housing Stability: Not on file     Past Surgical History:   Procedure Laterality Date    APPENDECTOMY      COLONOSCOPY      EGD      [place] LAP,PROSTATECTOMY,RADICAL,W/NERVE SPARE,INCL ROBOTIC N/A 2022    Procedure: [address], [facility], LAPROSCOPIC LYSIS OF ADHESIONS;  Surgeon: Herberth Ortiz MD;  Location: [facility];  Service: Urology    PROSTATE BIOPSY      TONSILLECTOMY       History reviewed  No pertinent family history      DEMAR Hernandez  Date: 2/21/2022 Time: 11:19 AM

## 2022-02-21 NOTE — PLAN OF CARE
Problem: Potential for Falls  Goal: Patient will remain free of falls  Description: INTERVENTIONS:  - Educate patient/family on patient safety including physical limitations  - Instruct patient to call for assistance with activity   - Consult OT/PT to assist with strengthening/mobility   - Keep Call bell within reach  - Keep bed low and locked with side rails adjusted as appropriate  - Keep care items and personal belongings within reach  - Initiate and maintain comfort rounds  - Make Fall Risk Sign visible to staff  - Offer Toileting every  Hours, in advance of need  - Initiate/Maintain alarm  - Obtain necessary fall risk management equipment:   - Apply yellow socks and bracelet for high fall risk patients  - Consider moving patient to room near nurses station  Outcome: Progressing     Problem: MOBILITY - ADULT  Goal: Maintain or return to baseline ADL function  Description: INTERVENTIONS:  -  Assess patient's ability to carry out ADLs; assess patient's baseline for ADL function and identify physical deficits which impact ability to perform ADLs (bathing, care of mouth/teeth, toileting, grooming, dressing, etc )  - Assess/evaluate cause of self-care deficits   - Assess range of motion  - Assess patient's mobility; develop plan if impaired  - Assess patient's need for assistive devices and provide as appropriate  - Encourage maximum independence but intervene and supervise when necessary  - Involve family in performance of ADLs  - Assess for home care needs following discharge   - Consider OT consult to assist with ADL evaluation and planning for discharge  - Provide patient education as appropriate  Outcome: Progressing  Goal: Maintains/Returns to pre admission functional level  Description: INTERVENTIONS:  - Perform BMAT or MOVE assessment daily    - Set and communicate daily mobility goal to care team and patient/family/caregiver     - Collaborate with rehabilitation services on mobility goals if consulted  - Perform Range of Motion  times a day  - Reposition patient every  hours    - Dangle patient  times a day  - Stand patient  times a day  - Ambulate patient  times a day  - Out of bed to chair  times a day   - Out of bed for meals  times a day  - Out of bed for toileting  - Record patient progress and toleration of activity level   Outcome: Progressing     Problem: PAIN - ADULT  Goal: Verbalizes/displays adequate comfort level or baseline comfort level  Description: Interventions:  - Encourage patient to monitor pain and request assistance  - Assess pain using appropriate pain scale  - Administer analgesics based on type and severity of pain and evaluate response  - Implement non-pharmacological measures as appropriate and evaluate response  - Consider cultural and social influences on pain and pain management  - Notify physician/advanced practitioner if interventions unsuccessful or patient reports new pain  Outcome: Progressing     Problem: INFECTION - ADULT  Goal: Absence or prevention of progression during hospitalization  Description: INTERVENTIONS:  - Assess and monitor for signs and symptoms of infection  - Monitor lab/diagnostic results  - Monitor all insertion sites, i e  indwelling lines, tubes, and drains  - Monitor endotracheal if appropriate and nasal secretions for changes in amount and color  - [place] appropriate cooling/warming therapies per order  - Administer medications as ordered  - Instruct and encourage patient and family to use good hand hygiene technique  - Identify and instruct in appropriate isolation precautions for identified infection/condition  Outcome: Progressing  Goal: Absence of fever/infection during neutropenic period  Description: INTERVENTIONS:  - Monitor WBC    Outcome: Progressing     Problem: SAFETY ADULT  Goal: Patient will remain free of falls  Description: INTERVENTIONS:  - Educate patient/family on patient safety including physical limitations  - Instruct patient to call for assistance with activity   - Consult OT/PT to assist with strengthening/mobility   - Keep Call bell within reach  - Keep bed low and locked with side rails adjusted as appropriate  - Keep care items and personal belongings within reach  - Initiate and maintain comfort rounds  - Make Fall Risk Sign visible to staff  - Offer Toileting every  Hours, in advance of need  - Initiate/Maintain alarm  - Obtain necessary fall risk management equipment:   - Apply yellow socks and bracelet for high fall risk patients  - Consider moving patient to room near nurses station  Outcome: Progressing  Goal: Maintain or return to baseline ADL function  Description: INTERVENTIONS:  -  Assess patient's ability to carry out ADLs; assess patient's baseline for ADL function and identify physical deficits which impact ability to perform ADLs (bathing, care of mouth/teeth, toileting, grooming, dressing, etc )  - Assess/evaluate cause of self-care deficits   - Assess range of motion  - Assess patient's mobility; develop plan if impaired  - Assess patient's need for assistive devices and provide as appropriate  - Encourage maximum independence but intervene and supervise when necessary  - Involve family in performance of ADLs  - Assess for home care needs following discharge   - Consider OT consult to assist with ADL evaluation and planning for discharge  - Provide patient education as appropriate  Outcome: Progressing  Goal: Maintains/Returns to pre admission functional level  Description: INTERVENTIONS:  - Perform BMAT or MOVE assessment daily    - Set and communicate daily mobility goal to care team and patient/family/caregiver  - Collaborate with rehabilitation services on mobility goals if consulted  - Perform Range of Motion  times a day  - Reposition patient every  hours    - Dangle patient  times a day  - Stand patient  times a day  - Ambulate patient  times a day  - Out of bed to chair  times a day   - Out of bed for meals  times a day  - Out of bed for toileting  - Record patient progress and toleration of activity level   Outcome: Progressing     Problem: DISCHARGE PLANNING  Goal: Discharge to home or other facility with appropriate resources  Description: INTERVENTIONS:  - Identify barriers to discharge w/patient and caregiver  - Arrange for needed discharge resources and transportation as appropriate  - Identify discharge learning needs (meds, wound care, etc )  - Arrange for interpretive services to assist at discharge as needed  - Refer to Case Management Department for coordinating discharge planning if the patient needs post-hospital services based on physician/advanced practitioner order or complex needs related to functional status, cognitive ability, or social support system  Outcome: Progressing     Problem: Knowledge Deficit  Goal: Patient/family/caregiver demonstrates understanding of disease process, treatment plan, medications, and discharge instructions  Description: Complete learning assessment and assess knowledge base    Interventions:  - Provide teaching at level of understanding  - Provide teaching via preferred learning methods  Outcome: Progressing

## 2022-02-22 LAB
ANION GAP SERPL CALCULATED.3IONS-SCNC: 10 MMOL/L (ref 4–13)
BUN SERPL-MCNC: 15 MG/DL (ref 5–25)
CALCIUM SERPL-MCNC: 7.9 MG/DL (ref 8.3–10.1)
CHLORIDE SERPL-SCNC: 105 MMOL/L (ref 100–108)
CO2 SERPL-SCNC: 25 MMOL/L (ref 21–32)
CREAT SERPL-MCNC: 1.1 MG/DL (ref 0.6–1.3)
GFR SERPL CREATININE-BSD FRML MDRD: 66 ML/MIN/1.73SQ M
GLUCOSE SERPL-MCNC: 86 MG/DL (ref 65–140)
MAGNESIUM SERPL-MCNC: 1.7 MG/DL (ref 1.6–2.6)
PLATELET # BLD AUTO: 274 THOUSANDS/UL (ref 149–390)
PMV BLD AUTO: 10.1 FL (ref 8.9–12.7)
POTASSIUM SERPL-SCNC: 3.4 MMOL/L (ref 3.5–5.3)
SODIUM SERPL-SCNC: 140 MMOL/L (ref 136–145)

## 2022-02-22 PROCEDURE — 85049 AUTOMATED PLATELET COUNT: CPT | Performed by: UROLOGY

## 2022-02-22 PROCEDURE — 80048 BASIC METABOLIC PNL TOTAL CA: CPT | Performed by: INTERNAL MEDICINE

## 2022-02-22 PROCEDURE — 99024 POSTOP FOLLOW-UP VISIT: CPT | Performed by: PHYSICIAN ASSISTANT

## 2022-02-22 PROCEDURE — 0T9B70Z DRAINAGE OF BLADDER WITH DRAINAGE DEVICE, VIA NATURAL OR ARTIFICIAL OPENING: ICD-10-PCS | Performed by: UROLOGY

## 2022-02-22 PROCEDURE — 0TPBX0Z REMOVAL OF DRAINAGE DEVICE FROM BLADDER, EXTERNAL APPROACH: ICD-10-PCS | Performed by: UROLOGY

## 2022-02-22 PROCEDURE — 83735 ASSAY OF MAGNESIUM: CPT | Performed by: PHYSICIAN ASSISTANT

## 2022-02-22 PROCEDURE — 99232 SBSQ HOSP IP/OBS MODERATE 35: CPT | Performed by: INTERNAL MEDICINE

## 2022-02-22 RX ORDER — POTASSIUM CHLORIDE 20 MEQ/1
40 TABLET, EXTENDED RELEASE ORAL ONCE
Status: COMPLETED | OUTPATIENT
Start: 2022-02-22 | End: 2022-02-22

## 2022-02-22 RX ORDER — MAGNESIUM SULFATE HEPTAHYDRATE 40 MG/ML
2 INJECTION, SOLUTION INTRAVENOUS ONCE
Status: COMPLETED | OUTPATIENT
Start: 2022-02-22 | End: 2022-02-22

## 2022-02-22 RX ORDER — LISINOPRIL 20 MG/1
20 TABLET ORAL DAILY
Status: DISCONTINUED | OUTPATIENT
Start: 2022-02-22 | End: 2022-02-23 | Stop reason: HOSPADM

## 2022-02-22 RX ORDER — LORAZEPAM 1 MG/1
1 TABLET ORAL ONCE
Status: COMPLETED | OUTPATIENT
Start: 2022-02-22 | End: 2022-02-22

## 2022-02-22 RX ORDER — LIDOCAINE HYDROCHLORIDE 20 MG/ML
1 JELLY TOPICAL ONCE
Status: COMPLETED | OUTPATIENT
Start: 2022-02-22 | End: 2022-02-22

## 2022-02-22 RX ORDER — CEFAZOLIN SODIUM 1 G/50ML
1000 SOLUTION INTRAVENOUS ONCE
Status: COMPLETED | OUTPATIENT
Start: 2022-02-22 | End: 2022-02-22

## 2022-02-22 RX ADMIN — ACETAMINOPHEN 650 MG: 325 TABLET, FILM COATED ORAL at 17:48

## 2022-02-22 RX ADMIN — BACITRACIN, NEOMYCIN, POLYMYXIN B 1 SMALL APPLICATION: 400; 3.5; 5 OINTMENT TOPICAL at 08:56

## 2022-02-22 RX ADMIN — LISINOPRIL 20 MG: 20 TABLET ORAL at 10:58

## 2022-02-22 RX ADMIN — BACITRACIN, NEOMYCIN, POLYMYXIN B 1 SMALL APPLICATION: 400; 3.5; 5 OINTMENT TOPICAL at 17:48

## 2022-02-22 RX ADMIN — CEFAZOLIN SODIUM 1000 MG: 1 SOLUTION INTRAVENOUS at 15:36

## 2022-02-22 RX ADMIN — SIMETHICONE 80 MG: 80 TABLET, CHEWABLE ORAL at 11:50

## 2022-02-22 RX ADMIN — OXYCODONE HYDROCHLORIDE 5 MG: 5 TABLET ORAL at 18:34

## 2022-02-22 RX ADMIN — LIDOCAINE HYDROCHLORIDE 1 APPLICATION: 20 JELLY TOPICAL at 14:51

## 2022-02-22 RX ADMIN — ACETAMINOPHEN 650 MG: 325 TABLET, FILM COATED ORAL at 11:03

## 2022-02-22 RX ADMIN — ACETAMINOPHEN 650 MG: 325 TABLET, FILM COATED ORAL at 05:01

## 2022-02-22 RX ADMIN — ENOXAPARIN SODIUM 30 MG: 30 INJECTION, SOLUTION INTRAVENOUS; SUBCUTANEOUS at 21:50

## 2022-02-22 RX ADMIN — MAGNESIUM SULFATE 2 G: 2 INJECTION INTRAVENOUS at 10:58

## 2022-02-22 RX ADMIN — GABAPENTIN 300 MG: 300 CAPSULE ORAL at 21:50

## 2022-02-22 RX ADMIN — LORAZEPAM 1 MG: 1 TABLET ORAL at 14:51

## 2022-02-22 RX ADMIN — POTASSIUM CHLORIDE 40 MEQ: 1500 TABLET, EXTENDED RELEASE ORAL at 08:56

## 2022-02-22 RX ADMIN — OXYCODONE HYDROCHLORIDE 5 MG: 5 TABLET ORAL at 14:28

## 2022-02-22 RX ADMIN — SIMETHICONE 80 MG: 80 TABLET, CHEWABLE ORAL at 05:16

## 2022-02-22 NOTE — PROGRESS NOTES
Progress Note - Urology  Teena Cameron 1950, 66 y o  male MRN: [id number]    Unit/Bed#: E5 -01 Encounter: [id number]    * Prostate cancer [facility])  Assessment & Plan  · POD#5 RALP with BL pelvic lymph node dissection and lysis of adhesions  · Humphreys catheter was patent draining clear yellow urine  · Total output 300 mL output in the past 24 hours  · WANDA drain, total drainage for 24 hours was 2025 mL, serosanguinous drainage  · Drainage tube switched from bulb suction to gravity drainage  · WANDA fluid Creatinine >40 consistent with suspected anastomotic urine leak  · Due to continued elevated WANDA output, humphreys catheter was exchanged for modified fenestrated humphreys catheter by Dr Ventura Fields at the bedside to help decrease leakage rate at the urethrovesical anastomosis  · AYO resolved this morning  Creatinine 1 10 today, peak at 2 29    · Discussed with Dr Kacie Shaffer with Nephrology, patient is clear from Nephrology standpoint  · Patient to restart lisinopril at home  · Will follow up in the outpatient setting  · K 3 4 this morning, PO replacement ordered  Mg 1 7, IV replacement ordered  · Pain well controlled today  · Dependent scrotal edema, no indication for imaging prior to discharge  · Erythema of right hip/aneterior thigh suspicious of cellulitis  Picture documented in Media section  IV Ancef ordered  · Encouraged ambulation  Incentive Spirometry, and DVT prophylaxis  · Will keep an additional night to monitor output following fenestrated humphreys insertion and suspected cellulitis of right hip/anterior thigh  Subjective:   HPI: Patient states his abdominal pain has improved from last night  He has not required IV pain medications in the past 24 hours  He had a bowel movement this morning  He is ambulating well without assistance and tolerating oral diet  He denies any tenderness of his scrotum  Review of Systems   Constitutional: Negative for chills and fever  HENT: Negative  Respiratory: Negative for cough and shortness of breath  Cardiovascular: Negative for chest pain and leg swelling  Gastrointestinal: Negative for abdominal pain, constipation, nausea and vomiting  Genitourinary: Positive for scrotal swelling  Negative for flank pain, hematuria and testicular pain  Musculoskeletal: Negative  Skin:        Blister on right thigh from humphreys catheter stat lock  Denies irritation at surgical incision sites  Neurological: Negative for dizziness and light-headedness  Objective:  Nursing Rounds: Plan communicated with Lillia Sandifer, RN  Vitals: Blood pressure (!) 190/88, pulse 87, temperature 97 6 °F (36 4 °C), resp  rate 18, height 5' 8" (1 727 m), weight 77 7 kg (171 lb 4 8 oz), SpO2 96 %  ,Body mass index is 26 05 kg/m²  Physical Exam  Vitals reviewed  Constitutional:       General: He is not in acute distress  Appearance: Normal appearance  He is not ill-appearing, toxic-appearing or diaphoretic  HENT:      Head: Normocephalic and atraumatic  Eyes:      Conjunctiva/sclera: Conjunctivae normal    Cardiovascular:      Rate and Rhythm: Normal rate and regular rhythm  Heart sounds: Normal heart sounds  Pulmonary:      Effort: Pulmonary effort is normal  No respiratory distress  Breath sounds: Normal breath sounds  Abdominal:      General: Bowel sounds are normal  There is no distension  Palpations: Abdomen is soft  Tenderness: There is no abdominal tenderness  There is no right CVA tenderness, left CVA tenderness, guarding or rebound  Comments: Incision sites clean, dry, and intact  LLQ WANDA to drainage bag    Genitourinary:     Comments: Humphreys catheter is patent with clear yellow urine  Musculoskeletal:      Cervical back: Normal range of motion  Skin:     General: Skin is warm and dry  Comments: 2 5 x 1 cm blister on anterior, superior left thigh secondary for humphreys catheter stat lock     Erythema of right hip/anterior thigh documented in Media section  Neurological:      General: No focal deficit present  Mental Status: He is alert and oriented to person, place, and time  Psychiatric:         Mood and Affect: Mood normal          Behavior: Behavior normal          Thought Content: Thought content normal          Judgment: Judgment normal        Imaging:  No new imaging  Labs:  Recent Labs     22  0928   WBC 13 84*       Recent Labs     22  0928   HGB 11 1*     Recent Labs     22  0928   HCT 32 1*     Recent Labs     22  1614 22  0527 22  1516 22  0614 22  0504   CREATININE 2 19* 2 29* 1 80* 1 52* 1 10     Preop Urine Culture No Growth <1000 cfu/mL                  Specimen Collected: 22 11:39 Last Resulted: 02/15/22 14:08           History:    Past Medical History:   Diagnosis Date    Cancer [facility])     prostate  dx 2021    Colon polyp     Hearing aid worn     b/l    Hypertension     Pro[facility] [address] )     Seasonal allergies     Wears glasses      Social History     Socioeconomic History    Marital status: /Civil Union     Spouse name: None    Number of children: None    Years of education: None    Highest education level: None   Occupational History    None   Tobacco Use    Smoking status: Former Smoker     Packs/day: 0 50     Years: 45 00     Pack years: 22 50     Quit date:      Years since quittin 1    Smokeless tobacco: Never Used   Vaping Use    Vaping Use: Never used   Substance and Sexual Activity    Alcohol use: Yes     Alcohol/week: 14 0 standard drinks     Types: 14 Standard drinks or equivalent per week     Comment: Beer and liquor daily-average 2-3 drinks/day       Drug use: Yes     Types: Marijuana     Comment: marijuana   smokes  1 x daily  LD 2022    Sexual activity: Not Currently   Other Topics Concern    None   Social History Narrative    None     Social Determinants of Health     Financial Resource Strain: Not on file   Food Insecurity: Not on file   Transportation Needs: Not on file   Physical Activity: Not on file   Stress: Not on file   Social Connections: Not on file   Intimate Partner Violence: Not on file   Housing Stability: Not on file     Past Surgical History:   Procedure Laterality Date    APPENDECTOMY      COLONOSCOPY      EGD      [place] LAP,PROSTATECTOMY,RADICAL,W/NERVE SPARE,INCL ROBOTIC N/A 2/17/2022    Procedure: [address], [facility], [address];  Surgeon: Nirmal Mcadams MD;  Location: [facility] OR;  Service: Urology    PROSTATE BIOPSY      TONSILLECTOMY       History reviewed  No pertinent family history      Arabella Rodriguez PA-C  Date: 2/22/2022 Time: 3:40 PM

## 2022-02-22 NOTE — PLAN OF CARE
Problem: Potential for Falls  Goal: Patient will remain free of falls  Description: INTERVENTIONS:  - Educate patient/family on patient safety including physical limitations  - Instruct patient to call for assistance with activity   - Consult OT/PT to assist with strengthening/mobility   - Keep Call bell within reach  - Keep bed low and locked with side rails adjusted as appropriate  - Keep care items and personal belongings within reach  - Initiate and maintain comfort rounds  - Make Fall Risk Sign visible to staff  - Offer Toileting every  Hours, in advance of need  - Initiate/Maintain alarm  - Obtain necessary fall risk management equipment:   - Apply yellow socks and bracelet for high fall risk patients  - Consider moving patient to room near nurses station  Outcome: Progressing     Problem: MOBILITY - ADULT  Goal: Maintain or return to baseline ADL function  Description: INTERVENTIONS:  -  Assess patient's ability to carry out ADLs; assess patient's baseline for ADL function and identify physical deficits which impact ability to perform ADLs (bathing, care of mouth/teeth, toileting, grooming, dressing, etc )  - Assess/evaluate cause of self-care deficits   - Assess range of motion  - Assess patient's mobility; develop plan if impaired  - Assess patient's need for assistive devices and provide as appropriate  - Encourage maximum independence but intervene and supervise when necessary  - Involve family in performance of ADLs  - Assess for home care needs following discharge   - Consider OT consult to assist with ADL evaluation and planning for discharge  - Provide patient education as appropriate  Outcome: Progressing  Goal: Maintains/Returns to pre admission functional level  Description: INTERVENTIONS:  - Perform BMAT or MOVE assessment daily    - Set and communicate daily mobility goal to care team and patient/family/caregiver     - Collaborate with rehabilitation services on mobility goals if consulted  - Perform Range of Motion  times a day  - Reposition patient every  hours    - Dangle patient  times a day  - Stand patient  times a day  - Ambulate patient  times a day  - Out of bed to chair  times a day   - Out of bed for meals  times a day  - Out of bed for toileting  - Record patient progress and toleration of activity level   Outcome: Progressing     Problem: PAIN - ADULT  Goal: Verbalizes/displays adequate comfort level or baseline comfort level  Description: Interventions:  - Encourage patient to monitor pain and request assistance  - Assess pain using appropriate pain scale  - Administer analgesics based on type and severity of pain and evaluate response  - Implement non-pharmacological measures as appropriate and evaluate response  - Consider cultural and social influences on pain and pain management  - Notify physician/advanced practitioner if interventions unsuccessful or patient reports new pain  Outcome: Progressing     Problem: INFECTION - ADULT  Goal: Absence or prevention of progression during hospitalization  Description: INTERVENTIONS:  - Assess and monitor for signs and symptoms of infection  - Monitor lab/diagnostic results  - Monitor all insertion sites, i e  indwelling lines, tubes, and drains  - Monitor endotracheal if appropriate and nasal secretions for changes in amount and color  - [place] appropriate cooling/warming therapies per order  - Administer medications as ordered  - Instruct and encourage patient and family to use good hand hygiene technique  - Identify and instruct in appropriate isolation precautions for identified infection/condition  Outcome: Progressing  Goal: Absence of fever/infection during neutropenic period  Description: INTERVENTIONS:  - Monitor WBC    Outcome: Progressing     Problem: SAFETY ADULT  Goal: Patient will remain free of falls  Description: INTERVENTIONS:  - Educate patient/family on patient safety including physical limitations  - Instruct patient to call for assistance with activity   - Consult OT/PT to assist with strengthening/mobility   - Keep Call bell within reach  - Keep bed low and locked with side rails adjusted as appropriate  - Keep care items and personal belongings within reach  - Initiate and maintain comfort rounds  - Make Fall Risk Sign visible to staff  - Offer Toileting every  Hours, in advance of need  - Initiate/Maintain alarm  - Obtain necessary fall risk management equipment:   - Apply yellow socks and bracelet for high fall risk patients  - Consider moving patient to room near nurses station  Outcome: Progressing  Goal: Maintain or return to baseline ADL function  Description: INTERVENTIONS:  -  Assess patient's ability to carry out ADLs; assess patient's baseline for ADL function and identify physical deficits which impact ability to perform ADLs (bathing, care of mouth/teeth, toileting, grooming, dressing, etc )  - Assess/evaluate cause of self-care deficits   - Assess range of motion  - Assess patient's mobility; develop plan if impaired  - Assess patient's need for assistive devices and provide as appropriate  - Encourage maximum independence but intervene and supervise when necessary  - Involve family in performance of ADLs  - Assess for home care needs following discharge   - Consider OT consult to assist with ADL evaluation and planning for discharge  - Provide patient education as appropriate  Outcome: Progressing  Goal: Maintains/Returns to pre admission functional level  Description: INTERVENTIONS:  - Perform BMAT or MOVE assessment daily    - Set and communicate daily mobility goal to care team and patient/family/caregiver  - Collaborate with rehabilitation services on mobility goals if consulted  - Perform Range of Motion  times a day  - Reposition patient every  hours    - Dangle patient  times a day  - Stand patient  times a day  - Ambulate patient  times a day  - Out of bed to chair  times a day   - Out of bed for meals  times a day  - Out of bed for toileting  - Record patient progress and toleration of activity level   Outcome: Progressing     Problem: DISCHARGE PLANNING  Goal: Discharge to home or other facility with appropriate resources  Description: INTERVENTIONS:  - Identify barriers to discharge w/patient and caregiver  - Arrange for needed discharge resources and transportation as appropriate  - Identify discharge learning needs (meds, wound care, etc )  - Arrange for interpretive services to assist at discharge as needed  - Refer to Case Management Department for coordinating discharge planning if the patient needs post-hospital services based on physician/advanced practitioner order or complex needs related to functional status, cognitive ability, or social support system  Outcome: Progressing     Problem: Knowledge Deficit  Goal: Patient/family/caregiver demonstrates understanding of disease process, treatment plan, medications, and discharge instructions  Description: Complete learning assessment and assess knowledge base    Interventions:  - Provide teaching at level of understanding  - Provide teaching via preferred learning methods  Outcome: Progressing     Problem: GENITOURINARY - ADULT  Goal: Maintains or returns to baseline urinary function  Description: INTERVENTIONS:  - Assess urinary function  - Encourage oral fluids to ensure adequate hydration if ordered  - Administer IV fluids as ordered to ensure adequate hydration  - Administer ordered medications as needed  - Offer frequent toileting  - Follow urinary retention protocol if ordered  Outcome: Progressing  Goal: Absence of urinary retention  Description: INTERVENTIONS:  - Assess patients ability to void and empty bladder  - Monitor I/O  - Bladder scan as needed  - Discuss with physician/AP medications to alleviate retention as needed  - Discuss catheterization for long term situations as appropriate  Outcome: Progressing  Goal: Urinary catheter remains patent  Description: INTERVENTIONS:  - Assess patency of urinary catheter  - If patient has a chronic humphreys, consider changing catheter if non-functioning  - Follow guidelines for intermittent irrigation of non-functioning urinary catheter  Outcome: Progressing     Problem: METABOLIC, FLUID AND ELECTROLYTES - ADULT  Goal: Electrolytes maintained within normal limits  Description: INTERVENTIONS:  - Monitor labs and assess patient for signs and symptoms of electrolyte imbalances  - Administer electrolyte replacement as ordered  - Monitor response to electrolyte replacements, including repeat lab results as appropriate  - Instruct patient on fluid and nutrition as appropriate  Outcome: Progressing  Goal: Fluid balance maintained  Description: INTERVENTIONS:  - Monitor labs   - Monitor I/O and WT  - Instruct patient on fluid and nutrition as appropriate  - Assess for signs & symptoms of volume excess or deficit  Outcome: Progressing     Problem: SKIN/TISSUE INTEGRITY - ADULT  Goal: Skin Integrity remains intact(Skin Breakdown Prevention)  Description: Assess:  -Perform [place] assessment every  -Clean and moisturize skin every   -Inspect skin when repositioning, toileting, and assisting with ADLS  -Assess under medical devices such as  every   -Assess extremities for adequate circulation and sensation     Bed Management:  -Have minimal linens on bed & keep smooth, unwrinkled  -Change linens as needed when moist or perspiring  -Avoid sitting or lying in one position for more than  hours while in bed  -Keep HOB at degrees     Toileting:  -Offer bedside commode  -Assess for incontinence every   -Use incontinent care products after each incontinent episode such as     Activity:  -Mobilize patient  times a day  -Encourage activity and walks on unit  -Encourage or provide ROM exercises   -Turn and reposition patient every  Hours  -Use appropriate equipment to lift or move patient in bed  -Instruct/ Assist with weight shifting every  when out of bed in chair  -Consider limitation of chair time  hour intervals    Skin Care:  -Avoid use of baby powder, tape, friction and shearing, hot water or constrictive clothing  -Relieve pressure over bony prominences using   -Do not massage red bony areas    Next Steps:  -Teach patient strategies to minimize risks such as    -Consider consults to  interdisciplinary teams such as   Outcome: Progressing  Goal: Incision(s), wounds(s) or drain site(s) healing without S/S of infection  Description: INTERVENTIONS  - Assess and document dressing, incision, wound bed, drain sites and surrounding tissue  - Provide patient and family education  - Perform skin care/dressing changes every   Outcome: Progressing

## 2022-02-22 NOTE — PLAN OF CARE
Problem: Potential for Falls  Goal: Patient will remain free of falls  Description: INTERVENTIONS:  - Educate patient/family on patient safety including physical limitations  - Instruct patient to call for assistance with activity   - Consult OT/PT to assist with strengthening/mobility   - Keep Call bell within reach  - Keep bed low and locked with side rails adjusted as appropriate  - Keep care items and personal belongings within reach  - Initiate and maintain comfort rounds  - Make Fall Risk Sign visible to staff  - - Apply yellow socks and bracelet for high fall risk patients  - Consider moving patient to room near nurses station  Outcome: Progressing     Problem: MOBILITY - ADULT  Goal: Maintain or return to baseline ADL function  Description: INTERVENTIONS:  -  Assess patient's ability to carry out ADLs; assess patient's baseline for ADL function and identify physical deficits which impact ability to perform ADLs (bathing, care of mouth/teeth, toileting, grooming, dressing, etc )  - Assess/evaluate cause of self-care deficits   - Assess range of motion  - Assess patient's mobility; develop plan if impaired  - Assess patient's need for assistive devices and provide as appropriate  - Encourage maximum independence but intervene and supervise when necessary  - Involve family in performance of ADLs  - Assess for home care needs following discharge   - Consider OT consult to assist with ADL evaluation and planning for discharge  - Provide patient education as appropriate  Outcome: Progressing  Goal: Maintains/Returns to pre admission functional level  Description: INTERVENTIONS:  - Perform BMAT or MOVE assessment daily    - Set and communicate daily mobility goal to care team and patient/family/caregiver     - Collaborate with rehabilitation services on mobility goals if consulted  - Out of bed for toileting  - Record patient progress and toleration of activity level   Outcome: Progressing     Problem: PAIN - ADULT  Goal: Verbalizes/displays adequate comfort level or baseline comfort level  Description: Interventions:  - Encourage patient to monitor pain and request assistance  - Assess pain using appropriate pain scale  - Administer analgesics based on type and severity of pain and evaluate response  - Implement non-pharmacological measures as appropriate and evaluate response  - Consider cultural and social influences on pain and pain management  - Notify physician/advanced practitioner if interventions unsuccessful or patient reports new pain  Outcome: Progressing     Problem: INFECTION - ADULT  Goal: Absence or prevention of progression during hospitalization  Description: INTERVENTIONS:  - Assess and monitor for signs and symptoms of infection  - Monitor lab/diagnostic results  - Monitor all insertion sites, i e  indwelling lines, tubes, and drains  - Monitor endotracheal if appropriate and nasal secretions for changes in amount and color  - [place] appropriate cooling/warming therapies per order  - Administer medications as ordered  - Instruct and encourage patient and family to use good hand hygiene technique  - Identify and instruct in appropriate isolation precautions for identified infection/condition  Outcome: Progressing  Goal: Absence of fever/infection during neutropenic period  Description: INTERVENTIONS:  - Monitor WBC    Outcome: Progressing     Problem: SAFETY ADULT  Goal: Patient will remain free of falls  Description: INTERVENTIONS:  - Educate patient/family on patient safety including physical limitations  - Instruct patient to call for assistance with activity   - Consult OT/PT to assist with strengthening/mobility   - Keep Call bell within reach  - Keep bed low and locked with side rails adjusted as appropriate  - Keep care items and personal belongings within reach  - Initiate and maintain comfort rounds  - Make Fall Risk Sign visible to staff  - Apply yellow socks and bracelet for high fall risk patients  - Consider moving patient to room near nurses station  Outcome: Progressing  Goal: Maintain or return to baseline ADL function  Description: INTERVENTIONS:  -  Assess patient's ability to carry out ADLs; assess patient's baseline for ADL function and identify physical deficits which impact ability to perform ADLs (bathing, care of mouth/teeth, toileting, grooming, dressing, etc )  - Assess/evaluate cause of self-care deficits   - Assess range of motion  - Assess patient's mobility; develop plan if impaired  - Assess patient's need for assistive devices and provide as appropriate  - Encourage maximum independence but intervene and supervise when necessary  - Involve family in performance of ADLs  - Assess for home care needs following discharge   - Consider OT consult to assist with ADL evaluation and planning for discharge  - Provide patient education as appropriate  Outcome: Progressing  Goal: Maintains/Returns to pre admission functional level  Description: INTERVENTIONS:  - Perform BMAT or MOVE assessment daily    - Set and communicate daily mobility goal to care team and patient/family/caregiver     - Collaborate with rehabilitation services on mobility goals if consulted  - Out of bed for toileting  - Record patient progress and toleration of activity level   Outcome: Progressing     Problem: DISCHARGE PLANNING  Goal: Discharge to home or other facility with appropriate resources  Description: INTERVENTIONS:  - Identify barriers to discharge w/patient and caregiver  - Arrange for needed discharge resources and transportation as appropriate  - Identify discharge learning needs (meds, wound care, etc )  - Arrange for interpretive services to assist at discharge as needed  - Refer to Case Management Department for coordinating discharge planning if the patient needs post-hospital services based on physician/advanced practitioner order or complex needs related to functional status, cognitive ability, or social support system  Outcome: Progressing     Problem: Knowledge Deficit  Goal: Patient/family/caregiver demonstrates understanding of disease process, treatment plan, medications, and discharge instructions  Description: Complete learning assessment and assess knowledge base    Interventions:  - Provide teaching at level of understanding  - Provide teaching via preferred learning methods  Outcome: Progressing

## 2022-02-22 NOTE — QUICK NOTE
After Dr Soni Crowe has discussed and reviewed the patient's case with the on-site advanced practitioner, there is concern for ongoing anastomotic urinary leak  By report of the operation, there was questionable leak from the patient's right side  In an effort to control this leak, modified catheter can be placed at the bedside with an additional iatrogenic drainage hole created distal to the balloon on the ventral surface of the catheter  I personally saw and examined the patient at bedside  He does have some abdominal distention and tenderness around his port sites  There is some ecchymosis around the extraction site  His scrotum is swollen but non tense and is testicles are nontender or indurated  He does appear to have some erythema along his right thigh  His indwelling catheter is an 25 [place] and is draining some concentrated urine  I discussed the plan and option with the patient of exchange of his catheter for a modified tube  He is agreeable  He was administered an oral Ativan dose after our discussion  I personally removed his indwelling catheter by deflating the balloon completely  A removed easily without difficulty  I then prepped the patient's urethral meatus with Betadine  I inserted 2% viscous lidocaine  I personally created a new drainage hole on the ventral surface of the 20 [place] Dove catheter distal to the balloon with an iris scissor  Balloon port remained intact  The 21 [place] catheter easily advanced without any resistance or difficulty to its hub  Once in place, 15 cc of sterile water replaced in the balloon  The catheter was retrieved back to the bladder neck and there was noted to be some concentrated urine drainage immediately that confirmed appropriate placement  Patient tolerated this procedure well  His catheter was hooked to gravity drainage  His WANDA drain remains hooked to gravity drainage        Given the erythema of the right thigh, I do recommend the patient be restarted on Ancef  I did discuss this with the patient and Dr Giovanny Baez

## 2022-02-22 NOTE — PROGRESS NOTES
Follow up Consultation    Nephrology   Stevie De La Torre 66 y o  male MRN: [id number]  Unit/Bed#: E5 -01 Encounter: [id number]      Physician Requesting Consult: Jamia Claudio MD        ASSESSMENT/PLAN:  19-year-old male with multiple comorbidities including prostate cancer admitted for elective prostatectomy postoperatively with acute kidney injury nephrology following  Acute kidney injury :  AYO most likely secondary to MAGEN (01/17/2022) plus failure to auto regulate presence of hemodynamic perturbations intraoperatively with subsequent ATN  After review of records In [facility] as well as Care everywhere it appears that the patient has a baseline Creatinine of 1 2 mg/dL  Preoperatively  Other than this no other lab works  patient was admitted with a creatinine of 1 52 mg/dL on 02/17/2022  Peak creatinine this hospitalization thus far at 2 29 mg/dL on 02/20/2022  patient's creatinine today is at 1 10 mg/dL, stable and continues to improve, back at baseline  check BMP in a m  In 1 week  Optimize hemodynamic status to avoid delay in renal recovery  Place on a renal diet when allowed diet order  Avoid nephrotoxins, adjust meds to appropriate GFR  Strict I/O  Daily weights  Urinary retention protocol if patient does not have a Dove  will need to set up patient for follow up with Nephrology as an outpatient post hospitalization  for nephrology as an outpatient patient follows up with no nephrologist  Stable for discharge from renal standpoint if medically cleared message sent to the office to arrange for outpatient follow-up in 2-3 weeks with blood work prior to the visit    Blood pressure/hypertension:  current medications:  No meds remains stable  recommendations:  Okay to resume home lisinopril 20 mg p o  Q day due to elevated blood pressures  Optimize hemodynamics  Maintain MAP > 65mmHg  Avoid BP fluctuations      H/H/anemia:  most recent hemoglobin at 11 1 grams/deciliter  maintain hemoglobin greater than 8 grams/deciliter    Acid-base electrolytes:    Electrolytes:    Stable    Acid-base:    Most recent bicarb stable at 25    Other medical problems:  Proteinuria: Most recent UA with no protein as of 2022  Prostate cancer:  Management per primary team   Follow-up with Urology status post radical laparoscopic prostatectomy on 2022  Dove remains in place  Thanks for the consult  Will sign off  Please call with questions/ concerns  Above-mentioned orders and Plan in terms of acute kidney injury was discussed with the Urology team in depth in-person    Toño Murphy MD, JESSIE, 2022, 10:42 AM              Objective :   Patient seen and examined in his room no overnight events blood pressure slightly elevated remains afebrile H plan for potential discharge later today  Drain output close to 1 6 L urine output 250 cc  PHYSICAL EXAM  /96   Pulse 76   Temp 97 8 °F (36 6 °C)   Resp 18   Ht 5' 8" (1 727 m)   Wt 77 7 kg (171 lb 4 8 oz) Comment: with clothing on  SpO2 97%   BMI 26 05 kg/m²   Temp (24hrs), Av 9 °F (36 6 °C), Min:97 8 °F (36 6 °C), Max:98 1 °F (36 7 °C)        Intake/Output Summary (Last 24 hours) at 2022 1042  Last data filed at 2022 1034  Gross per 24 hour   Intake 194 67 ml   Output 2125 ml   Net -183 33 ml       I/O last 24 hours: In:  7 [I V : 7]  Out: 2300 [Urine:400; Drains:1900]      Current Weight: Weight - Scale: 77 7 kg (171 lb 4 8 oz) (with clothing on)  First Weight: Weight - Scale: 77 7 kg (171 lb 4 8 oz) (with clothing on)  Physical Exam  Vitals and nursing note reviewed  Constitutional:       General: He is not in acute distress  Appearance: Normal appearance  He is normal weight  He is not ill-appearing, toxic-appearing or diaphoretic  HENT:      Head: Normocephalic and atraumatic  Mouth/Throat:      Mouth: Mucous membranes are moist       Pharynx: Oropharynx is clear  No oropharyngeal exudate     Eyes: General: No scleral icterus  Conjunctiva/sclera: Conjunctivae normal    Cardiovascular:      Rate and Rhythm: Normal rate  Heart sounds: Normal heart sounds  No friction rub  Pulmonary:      Effort: Pulmonary effort is normal  No respiratory distress  Breath sounds: Normal breath sounds  No stridor  No wheezing  Abdominal:      General: There is no distension  Palpations: Abdomen is soft  There is no mass  Tenderness: There is no abdominal tenderness  Musculoskeletal:         General: No swelling  Cervical back: Normal range of motion and neck supple  No rigidity  Skin:     General: Skin is warm  Coloration: Skin is not jaundiced  Neurological:      General: No focal deficit present  Mental Status: He is alert and oriented to person, place, and time  Psychiatric:         Mood and Affect: Mood normal          Behavior: Behavior normal              Review of Systems   Constitutional: Negative for appetite change, fatigue and fever  HENT: Negative for congestion  Respiratory: Negative for cough, shortness of breath and wheezing  Cardiovascular: Negative for leg swelling  Gastrointestinal: Negative for abdominal pain, constipation, diarrhea, nausea and vomiting  Musculoskeletal: Negative for back pain  Neurological: Negative for dizziness and headaches  Psychiatric/Behavioral: Negative for agitation and confusion  All other systems reviewed and are negative        Scheduled Meds:  Current Facility-Administered Medications   Medication Dose Route Frequency Provider Last Rate    acetaminophen  650 mg Oral Q6H [address] Ely Kilpatrick MD      calcium carbonate  500 mg Oral Daily PRN Ronny Ortega PA-C      docusate sodium  100 mg Oral BID DEMAR Morse      enoxaparin  30 mg Subcutaneous Q24H [address] DEMAR Morse      gabapentin  300 mg Oral HS Ely Kilpatrick MD      HYDROmorphone  0 5 mg Intravenous Q6H PRN Ruperto George DEMAR Bautista      magnesium sulfate  2 g Intravenous Once Saida Fox PA-C      melatonin  3 mg Oral HS PRN Sherrel LabDEMAR Parker      neomycin-bacitracin-polymyxin b  1 small application Topical BID Armen Jean MD      ondansetron  4 mg Intravenous Q6H PRN Armen Jean MD      oxyCODONE  2 5 mg Oral Q4H PRN Armen Jean MD      oxyCODONE  5 mg Oral Q4H PRN Armen Jean MD      senna  1 tablet Oral HS PRN DEMAR Davis      simethicone  80 mg Oral Q6H PRN Kendra Blizzard, PA-C         PRN Meds: calcium carbonate    HYDROmorphone    melatonin    ondansetron    oxyCODONE    oxyCODONE    senna    simethicone    Continuous Infusions:       Invasive Devices: Invasive Devices  Report    Peripheral Intravenous Line            Peripheral IV 02/21/22 Dorsal (posterior); Right Hand 1 day          Drain            Closed/Suction Drain Lateral;Left LLQ Bulb 10 Fr  5 days    Urethral Catheter Non-latex 20 Fr  5 days                  LABORATORY:    Results from last 7 days   Lab Units 02/22/22  0906 02/22/22  0504 02/21/22  0614 02/20/22  1516 02/20/22  0928 02/20/22  0527 02/19/22  1614 02/19/22  0504 02/18/22  1522 02/18/22  0517 02/18/22  0517 02/17/22  1420 02/17/22  1420   WBC Thousand/uL  --   --   --   --  13 84*  --   --   --   --   --  8 44  --  10 65*   HEMOGLOBIN g/dL  --   --   --   --  11 1*  --   --   --   --   --  11 3*  --  13 5   HEMATOCRIT %  --   --   --   --  32 1*  --   --   --   --   --  32 6*  --  39 3   PLATELETS Thousands/uL 274  --   --   --  159  --   --   --   --   --  130*  --  151   POTASSIUM mmol/L  --  3 4* 3 8 3 7  --  3 7 4 0 3 4* 3 6   < > 3 3*   < > 3 6   CHLORIDE mmol/L  --  105 102 102  --  101 102 102 101   < > 101   < > 101   CO2 mmol/L  --  25 26 27  --  24 27 25 23   < > 26   < > 24   BUN mg/dL  --  15 18 19  --  23 21 20 22   < > 21   < > 22   CREATININE mg/dL  --  1 10 1 52* 1 80*  --  2 29* 2 19* 1 97* 1 71* < > 2 09*   < > 1 52*   CALCIUM mg/dL  --  7 9* 7 7* 7 5*  --  7 6* 7 6* 7 2* 7 2*   < > 7 4*   < > 8 5   MAGNESIUM mg/dL 1 7  --   --   --   --   --   --  1 3*  --   --   --   --   --     < > = values in this interval not displayed  rest all reviewed    RADIOLOGY:  No orders to display     Rest all reviewed    Portions of the record may have been created with voice recognition software  Occasional wrong word or "sound a like" substitutions may have occurred due to the inherent limitations of voice recognition software  Read the chart carefully and recognize, using context, where substitutions have occurred  If you have any questions, please contact the dictating provider

## 2022-02-23 ENCOUNTER — TELEPHONE (OUTPATIENT)
Dept: NEPHROLOGY | Facility: CLINIC | Age: 72
End: 2022-02-23

## 2022-02-23 VITALS
OXYGEN SATURATION: 98 % | HEART RATE: 80 BPM | HEIGHT: 68 IN | BODY MASS INDEX: 25.96 KG/M2 | TEMPERATURE: 97.8 F | WEIGHT: 171.3 LBS | DIASTOLIC BLOOD PRESSURE: 89 MMHG | SYSTOLIC BLOOD PRESSURE: 160 MMHG | RESPIRATION RATE: 18 BRPM

## 2022-02-23 PROCEDURE — NC001 PR NO CHARGE: Performed by: PHYSICIAN ASSISTANT

## 2022-02-23 PROCEDURE — 99024 POSTOP FOLLOW-UP VISIT: CPT | Performed by: PHYSICIAN ASSISTANT

## 2022-02-23 RX ORDER — DOCUSATE SODIUM 100 MG/1
100 CAPSULE, LIQUID FILLED ORAL 2 TIMES DAILY
Qty: 30 CAPSULE | Refills: 0 | Status: SHIPPED | OUTPATIENT
Start: 2022-02-23

## 2022-02-23 RX ORDER — CEPHALEXIN 500 MG/1
500 CAPSULE ORAL EVERY 8 HOURS SCHEDULED
Qty: 18 CAPSULE | Refills: 0 | Status: SHIPPED | OUTPATIENT
Start: 2022-02-23 | End: 2022-03-01

## 2022-02-23 RX ORDER — ACETAMINOPHEN 325 MG/1
650 TABLET ORAL EVERY 6 HOURS PRN
Qty: 30 TABLET | Refills: 0 | Status: SHIPPED | OUTPATIENT
Start: 2022-02-23

## 2022-02-23 RX ADMIN — ACETAMINOPHEN 650 MG: 325 TABLET, FILM COATED ORAL at 05:10

## 2022-02-23 RX ADMIN — ACETAMINOPHEN 650 MG: 325 TABLET, FILM COATED ORAL at 00:23

## 2022-02-23 RX ADMIN — MELATONIN 3 MG: at 00:23

## 2022-02-23 RX ADMIN — SIMETHICONE 80 MG: 80 TABLET, CHEWABLE ORAL at 13:01

## 2022-02-23 RX ADMIN — BACITRACIN, NEOMYCIN, POLYMYXIN B 1 SMALL APPLICATION: 400; 3.5; 5 OINTMENT TOPICAL at 09:08

## 2022-02-23 RX ADMIN — LISINOPRIL 20 MG: 20 TABLET ORAL at 09:08

## 2022-02-23 RX ADMIN — ACETAMINOPHEN 650 MG: 325 TABLET, FILM COATED ORAL at 12:58

## 2022-02-23 NOTE — DISCHARGE SUMMARY
Discharge Summary - Fatimah Dubose 66 y o  male MRN: [id number]    Unit/Bed#: E5 -01 Encounter: [id number]    Admission Date: 2/17/2022     Discharge Date: 02/23/22    HPI: Fatimah Dubose is a 66 y o  male who presented for RALP-BPLND by Dr Grant Reyes     Procedure(s):  [address], BPLND, LAPROSCOPIC LYSIS OF ADHESIONS  Surgeon(s):  MD Suhas Cline PA-C  2/17/2022    Hospital Course:  Guillermo Garrett is a 54-year-old man admitted to the hospital for radical prostatectomy bilateral pelvic lymph node dissection this was performed on 02/17/2022  He remained in the hospital postoperatively  Has excellent pain control with oral medications  He has been ambulating regularly  Gradually his diet was advanced once he was passing gas ultimately having bowel movements prior to discharge on a full diet  There was intraoperative concern for anastomotic urine leak, this was confirmed with WANDA fluid creatinine greater than 40  He had very high output volumes from the WANDA drain  This drain was hooked to a gravity drainage bag on postop day three, this will remain in place for the time being  He did develop acute kidney injury with a peak creatinine of 2 2, some of this was suspected ATN, majority suspected reabsorption of his own urine leak  This resolved the day prior to discharge with a creatinine of 1 1  He will see Nephrology with a BMP approximately one week after discharge  His Dove catheter was exchanged on postop day five for a fenestrated Dove catheter to allow improved drainage from the bladder with the expectation that this anastomosis will heal itself over time  Both the Dove catheter and pelvic drain will remain in place the time of discharge  He has been educated had a care for these and monitor the output volumes over the next 1-2 weeks  He had some scrotal swelling and mild erythema to the right thigh, and later a small blister from skin tape    He is on Ancef for 24 hours will be discharged on oral course of Keflex these are both improving on the day of discharge  Discharge Diagnosis: Prostate cancer ([facility] [address] )    Condition at Discharge: good    Discharge Medications:  See after visit summary for reconciled discharge medications provided to patient and family  Patient was discharged home on home medications with the addition of Keflex, Tylenol, Colace  Resume lisinopril, BMP one week  Discharge instructions/Information to patient and family:   See after visit summary for written and verbal information which has been provided to patient and family  Provisions for Follow-Up Care:  See after visit summary for information related to follow-up care and any pertinent home health orders  Disposition: Home    Planned Readmission: No    Discharge Statement   I spent 30 minutes discharging the patient  This time was spent on the day of discharge  I had direct contact with the patient on the day of discharge  Additional documentation is required if more than 30 minutes were spent on discharge  Signature:    Willis Paul PA-C  Date: 2/23/2022 Time: 11:57 AM

## 2022-02-23 NOTE — PLAN OF CARE
Problem: Potential for Falls  Goal: Patient will remain free of falls  Description: INTERVENTIONS:  - Educate patient/family on patient safety including physical limitations  - Instruct patient to call for assistance with activity   - Consult OT/PT to assist with strengthening/mobility   - Keep Call bell within reach  - Keep bed low and locked with side rails adjusted as appropriate  - Keep care items and personal belongings within reach  - Initiate and maintain comfort rounds  - Make Fall Risk Sign visible to staff  - Apply yellow socks and bracelet for high fall risk patients  - Consider moving patient to room near nurses station  Outcome: Progressing     Problem: MOBILITY - ADULT  Goal: Maintain or return to baseline ADL function  Description: INTERVENTIONS:  -  Assess patient's ability to carry out ADLs; assess patient's baseline for ADL function and identify physical deficits which impact ability to perform ADLs (bathing, care of mouth/teeth, toileting, grooming, dressing, etc )  - Assess/evaluate cause of self-care deficits   - Assess range of motion  - Assess patient's mobility; develop plan if impaired  - Assess patient's need for assistive devices and provide as appropriate  - Encourage maximum independence but intervene and supervise when necessary  - Involve family in performance of ADLs  - Assess for home care needs following discharge   - Consider OT consult to assist with ADL evaluation and planning for discharge  - Provide patient education as appropriate  Outcome: Progressing  Goal: Maintains/Returns to pre admission functional level  Description: INTERVENTIONS:  - Perform BMAT or MOVE assessment daily    - Set and communicate daily mobility goal to care team and patient/family/caregiver     - Collaborate with rehabilitation services on mobility goals if consulted  - Out of bed for toileting  - Record patient progress and toleration of activity level   Outcome: Progressing     Problem: PAIN - ADULT  Goal: Verbalizes/displays adequate comfort level or baseline comfort level  Description: Interventions:  - Encourage patient to monitor pain and request assistance  - Assess pain using appropriate pain scale  - Administer analgesics based on type and severity of pain and evaluate response  - Implement non-pharmacological measures as appropriate and evaluate response  - Consider cultural and social influences on pain and pain management  - Notify physician/advanced practitioner if interventions unsuccessful or patient reports new pain  Outcome: Progressing     Problem: INFECTION - ADULT  Goal: Absence or prevention of progression during hospitalization  Description: INTERVENTIONS:  - Assess and monitor for signs and symptoms of infection  - Monitor lab/diagnostic results  - Monitor all insertion sites, i e  indwelling lines, tubes, and drains  - Monitor endotracheal if appropriate and nasal secretions for changes in amount and color  - [place] appropriate cooling/warming therapies per order  - Administer medications as ordered  - Instruct and encourage patient and family to use good hand hygiene technique  - Identify and instruct in appropriate isolation precautions for identified infection/condition  Outcome: Progressing  Goal: Absence of fever/infection during neutropenic period  Description: INTERVENTIONS:  - Monitor WBC    Outcome: Progressing     Problem: SAFETY ADULT  Goal: Patient will remain free of falls  Description: INTERVENTIONS:  - Educate patient/family on patient safety including physical limitations  - Instruct patient to call for assistance with activity   - Consult OT/PT to assist with strengthening/mobility   - Keep Call bell within reach  - Keep bed low and locked with side rails adjusted as appropriate  - Keep care items and personal belongings within reach  - Initiate and maintain comfort rounds  - Make Fall Risk Sign visible to staff  - Apply yellow socks and bracelet for high fall risk patients  - Consider moving patient to room near nurses station  Outcome: Progressing  Goal: Maintain or return to baseline ADL function  Description: INTERVENTIONS:  -  Assess patient's ability to carry out ADLs; assess patient's baseline for ADL function and identify physical deficits which impact ability to perform ADLs (bathing, care of mouth/teeth, toileting, grooming, dressing, etc )  - Assess/evaluate cause of self-care deficits   - Assess range of motion  - Assess patient's mobility; develop plan if impaired  - Assess patient's need for assistive devices and provide as appropriate  - Encourage maximum independence but intervene and supervise when necessary  - Involve family in performance of ADLs  - Assess for home care needs following discharge   - Consider OT consult to assist with ADL evaluation and planning for discharge  - Provide patient education as appropriate  Outcome: Progressing  Goal: Maintains/Returns to pre admission functional level  Description: INTERVENTIONS:  - Perform BMAT or MOVE assessment daily    - Set and communicate daily mobility goal to care team and patient/family/caregiver     - Collaborate with rehabilitation services on mobility goals if consulted  - Out of bed for toileting  - Record patient progress and toleration of activity level   Outcome: Progressing     Problem: DISCHARGE PLANNING  Goal: Discharge to home or other facility with appropriate resources  Description: INTERVENTIONS:  - Identify barriers to discharge w/patient and caregiver  - Arrange for needed discharge resources and transportation as appropriate  - Identify discharge learning needs (meds, wound care, etc )  - Arrange for interpretive services to assist at discharge as needed  - Refer to Case Management Department for coordinating discharge planning if the patient needs post-hospital services based on physician/advanced practitioner order or complex needs related to functional status, cognitive ability, or social support system  Outcome: Progressing     Problem: Knowledge Deficit  Goal: Patient/family/caregiver demonstrates understanding of disease process, treatment plan, medications, and discharge instructions  Description: Complete learning assessment and assess knowledge base    Interventions:  - Provide teaching at level of understanding  - Provide teaching via preferred learning methods  Outcome: Progressing     Problem: GENITOURINARY - ADULT  Goal: Maintains or returns to baseline urinary function  Description: INTERVENTIONS:  - Assess urinary function  - Encourage oral fluids to ensure adequate hydration if ordered  - Administer IV fluids as ordered to ensure adequate hydration  - Administer ordered medications as needed  - Offer frequent toileting  - Follow urinary retention protocol if ordered  Outcome: Progressing  Goal: Absence of urinary retention  Description: INTERVENTIONS:  - Assess patients ability to void and empty bladder  - Monitor I/O  - Bladder scan as needed  - Discuss with physician/AP medications to alleviate retention as needed  - Discuss catheterization for long term situations as appropriate  Outcome: Progressing  Goal: Urinary catheter remains patent  Description: INTERVENTIONS:  - Assess patency of urinary catheter  - If patient has a chronic humphreys, consider changing catheter if non-functioning  - Follow guidelines for intermittent irrigation of non-functioning urinary catheter  Outcome: Progressing     Problem: METABOLIC, FLUID AND ELECTROLYTES - ADULT  Goal: Electrolytes maintained within normal limits  Description: INTERVENTIONS:  - Monitor labs and assess patient for signs and symptoms of electrolyte imbalances  - Administer electrolyte replacement as ordered  - Monitor response to electrolyte replacements, including repeat lab results as appropriate  - Instruct patient on fluid and nutrition as appropriate  Outcome: Progressing  Goal: Fluid balance maintained  Description: INTERVENTIONS:  - Monitor labs   - Monitor I/O and WT  - Instruct patient on fluid and nutrition as appropriate  - Assess for signs & symptoms of volume excess or deficit  Outcome: Progressing     Problem: SKIN/TISSUE INTEGRITY - ADULT  Goal: Skin Integrity remains intact(Skin Breakdown Prevention)  Description: Assess:  -Perform Govind assessment every shift  -Clean and moisturize skin every shift  -Inspect skin when repositioning, toileting, and assisting with ADLS  -Assess extremities for adequate circulation and sensation     Bed Management:  -Have minimal linens on bed & keep smooth, unwrinkled  -Change linens as needed when moist or perspiring      Skin Care:  -Avoid use of baby powder, tape, friction and shearing, hot water or constrictive clothing  -Do not massage red bony areas    Outcome: Progressing  Goal: Incision(s), wounds(s) or drain site(s) healing without S/S of infection  Description: INTERVENTIONS  - Assess and document dressing, incision, wound bed, drain sites and surrounding tissue  - Provide patient and family education  Outcome: Progressing

## 2022-02-23 NOTE — DISCHARGE INSTR - AVS FIRST PAGE
Keep moving/walking to keep the bowels going  No lifting more than 15 pounds for the next few weeks  This includes no shoveling/raking/lawn mowing or carrying children/pets  Hydrate well with goal at least 60 oz water per day    Care for your humphreys and pelvic drain bag as taught in the hospital  Caron Donato a diary of how many milliliters output from each bag keep diary for us  Once the pelvic drain output is <10% of the humphreys total, or dried up altogether, we will remove it in our office; time will tell  You have no skin stitches or staples, everything is internal    Tylenol for pain  Colace for stool softener  Keflex for antibiotic

## 2022-02-23 NOTE — PROGRESS NOTES
Progress Note - Urology  Anais Goayl 1950, 66 y o  male MRN: [id number]    Unit/Bed#: E5 -01 Encounter: [id number]      * Prostate cancer ([facility] [address] )  Assessment & Plan  · POD#6 RALP with BPLND and MAT  · New fenestrated Humphreys catheter output excellent 800ml overnight  · Pelvic drain to gravity- output lessening, 450ml overnight  · pelvid drain fluid Creatinine >40 consistent with suspected anastomotic urine leak; expect will heal on its own over time  Monitoring outputs humphreys vs pelvic drain over the next week or two  Both stay in place on discharge as is  · AYO resolved; peak 2 29, current 1 10  · Resume lisinopril for HTN  · BMP in 1 week after discharge  · Followup nephrology in office setting  · Erythema of right hip/aneterior thigh suspicious of cellulitis  Picture documented in Media section  IV Ancef ordered  Resolved overnight  Will continue PO keflex at discharge  · Doing well with ambulation  Incentive Spirometry, and DVT prophylaxis  PLAN:   Discharge to home today  Continue to monitor humphreys vs pelvic drain outputs; once pelvic drain <10% of total humphreys volume UOP will remove in office  No cystogram needed  OV postop scheduled 3/10/22    Subjective: no pain today  Humphreys and zay draining well  Tolerating full diet  [employer] to go home  Had several good BMs now  Comfortable with his drainage bags but will need some teaching for home  Review of Systems   Constitutional: Negative for activity change, appetite change, chills, fatigue, fever and unexpected weight change  HENT: Negative  Respiratory: Negative  Negative for shortness of breath  Cardiovascular: Negative  Negative for chest pain and leg swelling  Gastrointestinal: Negative for abdominal distention, abdominal pain, diarrhea, nausea and vomiting  Endocrine: Negative  Genitourinary: Positive for scrotal swelling   Negative for decreased urine volume, dysuria, flank pain, frequency, hematuria, penile swelling, testicular pain and urgency  Musculoskeletal: Negative for back pain and gait problem  Skin: Negative  Allergic/Immunologic: Negative  Neurological: Negative  Hematological: Negative for adenopathy  Does not bruise/bleed easily  Objective:  Vitals: Blood pressure 160/89, pulse 80, temperature 97 8 °F (36 6 °C), resp  rate 18, height 5' 8" (1 727 m), weight 77 7 kg (171 lb 4 8 oz), SpO2 98 %  ,Body mass index is 26 05 kg/m²  Intake/Output Summary (Last 24 hours) at 2/23/2022 1151  Last data filed at 2/23/2022 0515  Gross per 24 hour   Intake --   Output 1300 ml   Net -1300 ml     Invasive Devices  Report    Peripheral Intravenous Line            Peripheral IV 02/21/22 Dorsal (posterior); Right Hand 2 days          Drain            Closed/Suction Drain Lateral;Left LLQ Bulb 10 Fr  6 days    Urethral Catheter Non-latex 20 Fr  6 days                Physical Exam  Vitals and nursing note reviewed  Constitutional:       Appearance: He is well-developed  Comments: White male seated upright in bed pleasant conversive   HENT:      Head: Normocephalic and atraumatic  Cardiovascular:      Rate and Rhythm: Normal rate and regular rhythm  Heart sounds: Normal heart sounds  No murmur heard  Pulmonary:      Effort: Pulmonary effort is normal       Breath sounds: Normal breath sounds  Abdominal:      General: Bowel sounds are normal       Palpations: Abdomen is soft  Tenderness: There is no abdominal tenderness  There is no right CVA tenderness or left CVA tenderness  Comments: Fading ecchymosis and clean surgical incision sites open to air  LLQ drain with clear yellow/serous output to gravity drainage bag   Genitourinary:     Comments: Circumcised penis, normal phallus, orthotopic patent meatus  20Fr humphreys catheter per urethra draining clear yellow urine  Testes smooth descended bilaterally into the scrotum nontender with no palpable mass   Some scrotal erythema and swelling better than earlier this week  Musculoskeletal:         General: Normal range of motion  Right lower leg: No edema  Left lower leg: No edema  Comments: SCDs in place no calf tenderness or swelling   Skin:     General: Skin is warm and dry  Capillary Refill: Capillary refill takes less than 2 seconds  Coloration: Skin is not jaundiced or pale  Comments: Left anterior thigh 1x1 5cm superficial skin blister (pt reports site of previous tape/glue)  Neurological:      General: No focal deficit present  Mental Status: He is alert and oriented to person, place, and time  Gait: Gait normal    Psychiatric:         Mood and Affect: Mood normal          Labs:  No results for input(s): WBC in the last 72 hours  No results for input(s): HGB in the last 72 hours  Recent Labs     02/20/22  1516 02/21/22  0614 02/22/22  0504   CREATININE 1 80* 1 52* 1 10       History:    Past Medical History:   Diagnosis Date    Cancer [facility])     prostate  dx 12/2021    Colon polyp     Hearing aid worn     b/l    Hypertension     Prostate cancer ([address] )     Seasonal allergies     Wears glasses      Past Surgical History:   Procedure Laterality Date    APPENDECTOMY      COLONOSCOPY      EGD      CO LAP,PROSTATECTOMY,RADICAL,W/NERVE SPARE,INCL ROBOTIC N/A 2/17/2022    Procedure: PROSTATECTOMY RADICAL LAPAROSCOPIC  W ROBOTICS, BPLND, LAPROSCOPIC LYSIS OF ADHESIONS;  Surgeon: Herberth Ortiz MD;  Location: [facility] OR;  Service: Urology    PROSTATE BIOPSY      TONSILLECTOMY       History reviewed  No pertinent family history    Social History     Socioeconomic History    Marital status: /Civil Union     Spouse name: None    Number of children: None    Years of education: None    Highest education level: None   Occupational History    None   Tobacco Use    Smoking status: Former Smoker     Packs/day: 0 50     Years: 45 00     Pack years: 22 50     Quit date: 2013     Years since quitting: 9  1    Smokeless tobacco: Never Used   Vaping Use    Vaping Use: Never used   Substance and Sexual Activity    Alcohol use: Yes     Alcohol/week: 14 0 standard drinks     Types: 14 Standard drinks or equivalent per week     Comment: Beer and liquor daily-average 2-3 drinks/day       Drug use: Yes     Types: Marijuana     Comment: marijuana   smokes  1 x daily  LD 1/2022    Sexual activity: Not Currently   Other Topics Concern    None   Social History Narrative    None     Social Determinants of Health     Financial Resource Strain: Not on file   Food Insecurity: Not on file   Transportation Needs: Not on file   Physical Activity: Not on file   Stress: Not on file   Social Connections: Not on file   Intimate Partner Violence: Not on file   Housing Stability: Not on file         Lam Garcia  Date: 2/23/2022 Time: 11:51 AM

## 2022-02-23 NOTE — PLAN OF CARE
Problem: Potential for Falls  Goal: Patient will remain free of falls  Description: INTERVENTIONS:  - Educate patient/family on patient safety including physical limitations  - Instruct patient to call for assistance with activity   - Consult OT/PT to assist with strengthening/mobility   - Keep Call bell within reach  - Keep bed low and locked with side rails adjusted as appropriate  - Keep care items and personal belongings within reach  - Initiate and maintain comfort rounds  - Make Fall Risk Sign visible to staff  - Offer Toileting every 1 Hours, in advance of need  - Initiate/Maintain bed alarm  - Obtain necessary fall risk management equipment: call bell within reach  - Apply yellow socks and bracelet for high fall risk patients  - Consider moving patient to room near nurses station  2/23/2022 0138 by Kaylene Clark RN  Outcome: Progressing  2/23/2022 0136 by Kaylene Clark RN  Outcome: Progressing     Problem: MOBILITY - ADULT  Goal: Maintain or return to baseline ADL function  Description: INTERVENTIONS:  -  Assess patient's ability to carry out ADLs; assess patient's baseline for ADL function and identify physical deficits which impact ability to perform ADLs (bathing, care of mouth/teeth, toileting, grooming, dressing, etc )  - Assess/evaluate cause of self-care deficits   - Assess range of motion  - Assess patient's mobility; develop plan if impaired  - Assess patient's need for assistive devices and provide as appropriate  - Encourage maximum independence but intervene and supervise when necessary  - Involve family in performance of ADLs  - Assess for home care needs following discharge   - Consider OT consult to assist with ADL evaluation and planning for discharge  - Provide patient education as appropriate  2/23/2022 0138 by Kaylene Clark RN  Outcome: Progressing  2/23/2022 0136 by Kaylene Clark, RN  Outcome: Progressing  Goal: Maintains/Returns to pre admission functional level  Description: INTERVENTIONS:  - Perform BMAT or MOVE assessment daily    - Set and communicate daily mobility goal to care team and patient/family/caregiver     - Collaborate with rehabilitation services on mobility goals if consulted  - Out of bed for toileting  - Record patient progress and toleration of activity level   2/23/2022 0138 by Leeroy Miner RN  Outcome: Progressing  2/23/2022 0136 by Leeroy Miner RN  Outcome: Progressing     Problem: PAIN - ADULT  Goal: Verbalizes/displays adequate comfort level or baseline comfort level  Description: Interventions:  - Encourage patient to monitor pain and request assistance  - Assess pain using appropriate pain scale  - Administer analgesics based on type and severity of pain and evaluate response  - Implement non-pharmacological measures as appropriate and evaluate response  - Consider cultural and social influences on pain and pain management  - Notify physician/advanced practitioner if interventions unsuccessful or patient reports new pain  2/23/2022 0138 by Leeroy Miner RN  Outcome: Progressing  2/23/2022 0136 by Leeroy Miner RN  Outcome: Progressing     Problem: INFECTION - ADULT  Goal: Absence or prevention of progression during hospitalization  Description: INTERVENTIONS:  - Assess and monitor for signs and symptoms of infection  - Monitor lab/diagnostic results  - Monitor all insertion sites, i e  indwelling lines, tubes, and drains  - Monitor endotracheal if appropriate and nasal secretions for changes in amount and color  - [place] appropriate cooling/warming therapies per order  - Administer medications as ordered  - Instruct and encourage patient and family to use good hand hygiene technique  - Identify and instruct in appropriate isolation precautions for identified infection/condition  2/23/2022 0138 by Leeroy Miner RN  Outcome: Progressing  2/23/2022 0136 by Leeroy Miner RN  Outcome: Progressing  Goal: Absence of fever/infection during neutropenic period  Description: INTERVENTIONS:  - Monitor WBC    2/23/2022 0138 by Gini Long RN  Outcome: Progressing  2/23/2022 0136 by Gini Long RN  Outcome: Progressing     Problem: SAFETY ADULT  Goal: Patient will remain free of falls  Description: INTERVENTIONS:  - Educate patient/family on patient safety including physical limitations  - Instruct patient to call for assistance with activity   - Consult OT/PT to assist with strengthening/mobility   - Keep Call bell within reach  - Keep bed low and locked with side rails adjusted as appropriate  - Keep care items and personal belongings within reach  - Initiate and maintain comfort rounds  - Make Fall Risk Sign visible to staff  - Apply yellow socks and bracelet for high fall risk patients  - Consider moving patient to room near nurses station  2/23/2022 0138 by Gini Long RN  Outcome: Progressing  2/23/2022 0136 by Gini Long RN  Outcome: Progressing  Goal: Maintain or return to baseline ADL function  Description: INTERVENTIONS:  -  Assess patient's ability to carry out ADLs; assess patient's baseline for ADL function and identify physical deficits which impact ability to perform ADLs (bathing, care of mouth/teeth, toileting, grooming, dressing, etc )  - Assess/evaluate cause of self-care deficits   - Assess range of motion  - Assess patient's mobility; develop plan if impaired  - Assess patient's need for assistive devices and provide as appropriate  - Encourage maximum independence but intervene and supervise when necessary  - Involve family in performance of ADLs  - Assess for home care needs following discharge   - Consider OT consult to assist with ADL evaluation and planning for discharge  - Provide patient education as appropriate  2/23/2022 0138 by Gini Long RN  Outcome: Progressing  2/23/2022 0136 by Gini Long RN  Outcome: Progressing  Goal: Maintains/Returns to pre admission functional level  Description: INTERVENTIONS:  - Perform BMAT or MOVE assessment daily    - Set and communicate daily mobility goal to care team and patient/family/caregiver  - Collaborate with rehabilitation services on mobility goals if consulted  - Out of bed for toileting  - Record patient progress and toleration of activity level   2/23/2022 0138 by Janeth Quesada RN  Outcome: Progressing  2/23/2022 0136 by Janeth Quesada RN  Outcome: Progressing     Problem: DISCHARGE PLANNING  Goal: Discharge to home or other facility with appropriate resources  Description: INTERVENTIONS:  - Identify barriers to discharge w/patient and caregiver  - Arrange for needed discharge resources and transportation as appropriate  - Identify discharge learning needs (meds, wound care, etc )  - Arrange for interpretive services to assist at discharge as needed  - Refer to Case Management Department for coordinating discharge planning if the patient needs post-hospital services based on physician/advanced practitioner order or complex needs related to functional status, cognitive ability, or social support system  2/23/2022 0138 by Janeth Quesada RN  Outcome: Progressing  2/23/2022 0136 by Janeth Quesada RN  Outcome: Progressing     Problem: Knowledge Deficit  Goal: Patient/family/caregiver demonstrates understanding of disease process, treatment plan, medications, and discharge instructions  Description: Complete learning assessment and assess knowledge base    Interventions:  - Provide teaching at level of understanding  - Provide teaching via preferred learning methods  2/23/2022 0138 by Janeth Quesada RN  Outcome: Progressing  2/23/2022 0136 by Janeth Quesada RN  Outcome: Progressing     Problem: GENITOURINARY - ADULT  Goal: Maintains or returns to baseline urinary function  Description: INTERVENTIONS:  - Assess urinary function  - Encourage oral fluids to ensure adequate hydration if ordered  - Administer IV fluids as ordered to ensure adequate hydration  - Administer ordered medications as needed  - Offer frequent toileting  - Follow urinary retention protocol if ordered  2/23/2022 0138 by Ayan Stokes RN  Outcome: Progressing  2/23/2022 0136 by Ayan Stokes RN  Outcome: Progressing  Goal: Absence of urinary retention  Description: INTERVENTIONS:  - Assess patients ability to void and empty bladder  - Monitor I/O  - Bladder scan as needed  - Discuss with physician/AP medications to alleviate retention as needed  - Discuss catheterization for long term situations as appropriate  2/23/2022 0138 by Ayan Stokes RN  Outcome: Progressing  2/23/2022 0136 by Ayan Stokes RN  Outcome: Progressing  Goal: Urinary catheter remains patent  Description: INTERVENTIONS:  - Assess patency of urinary catheter  - If patient has a chronic humphreys, consider changing catheter if non-functioning  - Follow guidelines for intermittent irrigation of non-functioning urinary catheter  2/23/2022 0138 by Ayan Stokes RN  Outcome: Progressing  2/23/2022 0136 by Ayan Stokes RN  Outcome: Progressing     Problem: METABOLIC, FLUID AND ELECTROLYTES - ADULT  Goal: Electrolytes maintained within normal limits  Description: INTERVENTIONS:  - Monitor labs and assess patient for signs and symptoms of electrolyte imbalances  - Administer electrolyte replacement as ordered  - Monitor response to electrolyte replacements, including repeat lab results as appropriate  - Instruct patient on fluid and nutrition as appropriate  2/23/2022 0138 by Ayan Stokes RN  Outcome: Progressing  2/23/2022 0136 by Ayan Stokes RN  Outcome: Progressing  Goal: Fluid balance maintained  Description: INTERVENTIONS:  - Monitor labs   - Monitor I/O and WT  - Instruct patient on fluid and nutrition as appropriate  - Assess for signs & symptoms of volume excess or deficit  2/23/2022 0138 by Ayan Stokes RN  Outcome: Progressing  2/23/2022 0136 by Ayan Stokes RN  Outcome: Progressing     Problem: SKIN/TISSUE INTEGRITY - ADULT  Goal: Skin Integrity remains intact(Skin Breakdown Prevention)  Description: Assess:  -Perform Govind assessment every shift  -Clean and moisturize skin every day  -Inspect skin when repositioning, toileting, and assisting with ADLS  -Assess extremities for adequate circulation and sensation     Bed Management:  -Have minimal linens on bed & keep smooth, unwrinkled  -Change linens as needed when moist or perspiring  -Avoid sitting or lying in one position for more than 4 hours while in bed          Activity:  -Mobilize patient several times a day  -Encourage activity and walks on unit  -Encourage or provide ROM exercises   -Turn and reposition patient every 2 Hours  -Use appropriate equipment to lift or move patient in bed  -Skin Care:  -Avoid use of baby powder, tape, friction and shearing, hot water or constrictive clothing  -Relieve pressure over bony prominences using pillows  -Do not massage red bony areas    Next Steps:  -Teach patient strategies to minimize risks such as good hygiene   -Consider consults to  interdisciplinary teams such as urology  2/23/2022 0138 by David Wylie RN  Outcome: Progressing  2/23/2022 0136 by David Wylie RN  Outcome: Progressing  Goal: Incision(s), wounds(s) or drain site(s) healing without S/S of infection  Description: INTERVENTIONS  - Assess and document dressing, incision, wound bed, drain sites and surrounding tissue  - Provide patient and family education  - Perform skin care/dressing changes every day  2/23/2022 0138 by David Wylie RN  Outcome: Progressing  2/23/2022 0136 by David Wylie RN  Outcome: Progressing

## 2022-02-23 NOTE — DISCHARGE INSTRUCTIONS
Dove Catheter Placement and Care   AMBULATORY CARE:   A Dove catheter  is a sterile tube that is inserted into your bladder to drain urine  It is also called an indwelling urinary catheter  The tip of the catheter has a small balloon filled with solution that holds the catheter in your bladder  How a Dove catheter is placed: Your genital area will be cleaned to prevent infection  The catheter will be inserted into your urethra  When urine begins to flow into the tubing, the balloon is filled to keep the catheter in place  Then, the open end will be attached to a drainage bag  Seek care immediately if:   · Your catheter comes out  · You suddenly have material that looks like sand in the tubing or drainage bag  · No urine is draining into the bag and you have checked the system  · You have pain in your hip, back, pelvis, or lower abdomen  · You are confused or cannot think clearly  Call your doctor or urologist if:   · You have a fever  · You have bladder spasms for more than 1 day after the catheter is placed  · You see blood in the tubing or drainage bag  · You have a rash or itching where the catheter tube is secured to your skin  · Urine leaks from or around the catheter, tubing, or drainage bag  · The closed drainage system has accidently come open or apart  · You see a layer of crystals inside the tubing  · You have questions or concerns about your condition or care  Care for your catheter and drainage bag: You can reduce your risk for infection and injury by caring for your catheter and drainage bag properly  · Wash your hands often  Wash before and after you touch your catheter, tubing, or drainage bag  Use soap and water  Wear clean disposable gloves when you care for your catheter or disconnect the drainage bag  Wash your hands before you prepare or eat food  · Clean your genital area 2 times every day    Clean your catheter area and anal opening after every bowel movement  ? For men:  Use a soapy cloth to clean the tip of your penis  Start where the catheter enters  Wipe backward making sure to pull back the foreskin  Then use a cloth with clear water in the same direction to clean away the soap  ? For women:  Use a soapy cloth to clean the area that the catheter enters your body  Make sure to separate your labia and wipe toward the anus  Then use a cloth with clear water and wipe in the same direction  · Secure the catheter tube  so you do not pull or move the catheter  This helps prevent pain and bladder spasms  Healthcare providers will show you how to use medical tape or a strap to secure the catheter tube to your body  · Keep a closed drainage system  Your catheter should always be attached to the drainage bag to form a closed system  Do not disconnect any part of the closed system unless you need to change the bag  · Keep the drainage bag below the level of your waist   This helps stop urine from moving back up the tubing and into your bladder  Do not loop or kink the tubing  This can cause urine to back up and collect in your bladder  Do not let the drainage bag touch or lie on the floor  · Empty the drainage bag when needed  The weight of a full drainage bag can be painful  Empty the drainage bag every 3 to 6 hours or when it is ? full  · Clean and change the drainage bag as directed  Ask your healthcare provider how often you should change the drainage bag and what cleaning solution to use  Wear disposable gloves when you change the bag  Do not allow the end of the catheter or tubing to touch anything  Clean the ends with an alcohol pad before you reconnect them  What to do if problems develop:   · No urine is draining into the bag:      ? Check for kinks in the tubing and straighten them out  ? Check the tape or strap used to secure the catheter tube to your skin  Make sure it is not blocking the tube  ? Make sure you are not sitting or lying on the tubing  ? Make sure the urine bag is hanging below the level of your waist     · Urine leaks from or around the catheter, tubing, or drainage bag:  Check if the closed drainage system has accidently come open or apart  Clean the catheter and tubing ends with a new alcohol pad and reconnect them  Follow up with your doctor or urologist as directed:  Write down your questions so you remember to ask them during your visits  © Copyright [employer] 2021 Information is for End User's use only and may not be sold, redistributed or otherwise used for commercial purposes  All illustrations and images included in CareNotes® are the copyrighted property of A D A M , Inc  or Leann Thomas   The above information is an  only  It is not intended as medical advice for individual conditions or treatments  Talk to your doctor, nurse or pharmacist before following any medical regimen to see if it is safe and effective for you  Robot Assisted Laparoscopic Prostatectomy   AMBULATORY CARE:   What you need to know about robot-assisted laparoscopic prostatectomy (RALP):  RALP is surgery to remove your prostate gland through small incisions in your abdomen  RALP is done with a machine that is controlled by your surgeon  The machine has mechanical arms that use small tools to remove your prostate  How to prepare for RALP:   · Your surgeon will tell you how to prepare  Tell him or her about any other surgeries or cancer treatments you had  Tell him or her if you have had bleeding problems  · Tell your surgeon about all medicines you currently take  He or she will tell you if you need to stop any medicine before surgery, and when to stop  · Tell your surgeon about any allergies you have  Include allergic reactions to medicine, antibiotics, or anesthesia   You may need to take antibiotic medicine to help prevent an infection caused by bacteria  You may get antibiotic medicine before and after your surgery  · Arrange to have someone drive you home after surgery and stay with you for 24 hours  · You may need to have tests done before surgery, such as blood tests or a chest x-ray  · You may have to stop eating solid food for up to 2 days before your surgery  You can drink water, broth, apple juice, or lemon-lime soft drinks  You may also suck on ice chips or eat gelatin  You may be given medicine to drink or an enema to clean out your bowel  What will happen during RALP:   · A urinary catheter will be put into your bladder to drain your urine  The robotic arms place a laparoscope and other tools inside your abdomen through small cuts  A laparoscope is a long, thin tube with a light and camera on the end  A gas called carbon dioxide is pumped into your abdomen to inflate it  This gives your surgeon room so he or she can see your prostate better  · Your surgeon will use the camera to see inside your abdomen  He or she will guide the robotic arms to remove the prostate  The prostate will be removed through one of the small incisions in your abdomen  Lymph nodes may also be removed  Your surgeon will use the robotic arms to stitch your incisions closed  What to expect after RALP:   · Drains (thin rubber tubes) may be used to drain fluid from around your incision  The drains will be taken out when the surgery area stops draining  · A Dove catheter is a thin tube inserted through your urethra and moved into your bladder  The catheter is used to drain urine into a bag  Healthcare providers will remove the catheter when you no longer need it  Risks of RALP:   · You may bleed more than expected or get an infection  Your bladder, ureters (tubes that drain urine from your body), intestines, or rectum could be damaged during surgery  After RALP, you may have problems urinating or having bowel movements   You may need more surgery to treat urination problems  You may not be able to have an erection after surgery  Your stitches may come apart  You may have a hernia or develop an abscess (deep infection)  · You may get a blood clot in your arm or leg  The clot may travel to your heart or brain and cause life-threatening problems, such as a heart attack or stroke  Even with surgery, cancer may come back  Call your local emergency number (911 in the [address] [facility]) for any of the following:   · You have chest pain when you take a deep breath or cough  You cough up blood  · You suddenly feel lightheaded and short of breath  Call your surgeon or uro-oncologist if:   · Your arm or leg feels warm, tender, and painful  It may look swollen and red  · You have more blood in your urine than you were told to expect, or you pass a blood clot  · You have an upset stomach or are vomiting  · You have painful swelling in your abdomen that does not go away  · You have a fever  · Your pain is getting worse, even with medicine  · You have an incision that is red, swollen, or has pus coming from it  · You are urinating less than usual     · You have trouble urinating or having a bowel movement  · You have questions or concerns about your condition or care  Medicines: You may need any of the following:  · Prescription pain medicine  may be given  Ask your healthcare provider how to take this medicine safely  Some prescription pain medicines contain acetaminophen  Do not take other medicines that contain acetaminophen without talking to your healthcare provider  Too much acetaminophen may cause liver damage  Prescription pain medicine may cause constipation  Ask your healthcare provider how to prevent or treat constipation  · Antibiotics  prevent or fight an infection caused by bacteria  · Blood thinners  help prevent blood clots  Clots can cause strokes, heart attacks, and death   The following are general safety guidelines to follow while you are taking a blood thinner:    ? Watch for bleeding and bruising while you take blood thinners  Watch for bleeding from your gums or nose  Watch for blood in your urine and bowel movements  Use a soft washcloth on your skin, and a soft toothbrush to brush your teeth  This can keep your skin and gums from bleeding  If you shave, use an electric shaver  Do not play contact sports  ? Tell your dentist and other healthcare providers that you take a blood thinner  Wear a bracelet or necklace that says you take this medicine  ? Do not start or stop any other medicines unless your healthcare provider tells you to  Many medicines cannot be used with blood thinners  ? Take your blood thinner exactly as prescribed by your healthcare provider  Do not skip does or take less than prescribed  Tell your provider right away if you forget to take your blood thinner, or if you take too much  ? Warfarin  is a blood thinner that you may need to take  The following are things you should be aware of if you take warfarin:     § Foods and medicines can affect the amount of warfarin in your blood  Do not make major changes to your diet while you take warfarin  Warfarin works best when you eat about the same amount of vitamin K every day  Vitamin K is found in green leafy vegetables and certain other foods  Ask for more information about what to eat when you are taking warfarin  § You will need to see your healthcare provider for follow-up visits when you are on warfarin  You will need regular blood tests  These tests are used to decide how much medicine you need  · Take your medicine as directed  Contact your healthcare provider if you think your medicine is not helping or if you have side effects  Tell him or her if you are allergic to any medicine  Keep a list of the medicines, vitamins, and herbs you take  Include the amounts, and when and why you take them   Bring the list or the pill bottles to follow-up visits  Carry your medicine list with you in case of an emergency  Surgery area care: Follow your surgeon's directions on how to care for the area  Ask when you can start showering or bathing  You may need to keep the area covered so it does not get wet  Dove catheter care:  Keep the bag below your waist  If the bag is too high, urine will flow back into your bladder  This can cause an infection  Do not pull on the catheter  This may cause pain and bleeding, and the catheter may come out  Do not let the catheter tubing kink or twist  A kink or twist will block the flow of urine  Bladder control:  After surgery, you may leak urine and have trouble controlling when you urinate  The following can help decrease or manage urine leakage:  · Do not have caffeine  Caffeine can cause problems with bladder control and increase your need to urinate  · Limit liquids  Drink smaller amounts of liquid throughout the day  Do not drink before bedtime  Ask if you should decrease the amount of liquid you drink each day  This may help you control your bladder  · Wear a pad or adult diapers, if needed  These may help to absorb leaking urine and decrease odor  · Do pelvic floor muscle exercises  Pelvic floor muscle exercises, also called Kegels, may help improve your bladder control  These exercises are done by tightening and relaxing your pelvic muscles  Ask how to do pelvic floor muscle exercises, and how often to do them  Self-care:   · Rest as needed  You may feel like resting more after surgery  Slowly start to do more each day  · Ask when it is okay to return to your normal daily activities  You may need to wait 4 to 6 weeks after surgery  Your healthcare provider will tell you about the activities you should avoid after your surgery  These may include driving while you are taking pain medicines or until your catheter is removed   You may also be given a weight restriction on lifting objects  · Walk when your healthcare provider says more activity is okay  Walking is an important activity to help with your recovery after surgery  Walking is a good way to improve your overall health and help you recover sooner  It also helps keep your blood flowing and reduces the risk of blood clots  Other types of exercise can also be an important part of your recovery  Ask about the exercises that are safe for you  · Ask when it is okay to start having sex again  You may have trouble having an erection  Your erections may return with time  Medicines and mechanical aids may help  Talk to your healthcare provider about these options  Follow up with your surgeon or uro-oncologist in 1 week or as directed: You will need your urinary catheter removed  Tests will show if any cancer remains in your body after surgery  Write down your questions so you remember to ask them during your visits  © Copyright [employer] 2021 Information is for End User's use only and may not be sold, redistributed or otherwise used for commercial purposes  All illustrations and images included in CareNotes® are the copyrighted property of A D A M , Inc  or [facility]   The above information is an  only  It is not intended as medical advice for individual conditions or treatments  Talk to your doctor, nurse or pharmacist before following any medical regimen to see if it is safe and effective for you

## 2022-02-25 ENCOUNTER — TELEPHONE (OUTPATIENT)
Dept: UROLOGY | Facility: AMBULATORY SURGERY CENTER | Age: 72
End: 2022-02-25

## 2022-02-25 NOTE — TELEPHONE ENCOUNTER
Called and spoke with patient  Per Dr Sofi Garner, he would like to see patient Monday 2/28 at 4pm  Confirmed with patient

## 2022-02-25 NOTE — TELEPHONE ENCOUNTER
I spoke to patient he prefers to be seen at the [place] office  He is scheduled for April 7th since we have no sooner opening  I placed him on wait list if anything becomes available      - Labs mailed out

## 2022-02-25 NOTE — TELEPHONE ENCOUNTER
Post Op Note    Gonzalo Kulkarni is a 66 y o  male s/p PROSTATECTOMY RADICAL LAPAROSCOPIC  W ROBOTICS, BPLND, LAPROSCOPIC LYSIS OF ADHESIONS (N/A) performed 2/17/2022  Gonzalo Kulkarni is a patient of Dr Derek Jimenez and is seen at the [place] [address] office  How would you rate your pain on a scale from 1 to 10, 10 being the worst pain ever? 0  Have you had a fever? No  Have your bowel movements been regular? Yes  Do you have any difficulty urinating? No  If the patient has an incision- do you have any redness around the incision or any drainage from the incision? No  If the patient has a drain- are you having any issues with the drain? No  If the patient has a humphreys- are you comfortable caring for your humphreys? Yes Is it draining urine? Yes  Do you have any other questions or concerns that I can address at this time? None at this time    Patient is doing very well  His wife is keeping track of his urine and drain outputs  States that his outputs are decreasing and urine is clear yellow  He is staying well hydrated and getting around the house well  Advised Dr Derek Jimenez would like to see him sometime next week and we are currently working on a time  Patient was very thankful for the new catheter Dr Maycol Crisostomo put in for him and that it is working well

## 2022-02-28 ENCOUNTER — OFFICE VISIT (OUTPATIENT)
Dept: UROLOGY | Facility: AMBULATORY SURGERY CENTER | Age: 72
End: 2022-02-28

## 2022-02-28 VITALS
DIASTOLIC BLOOD PRESSURE: 88 MMHG | HEART RATE: 80 BPM | HEIGHT: 68 IN | BODY MASS INDEX: 27.43 KG/M2 | WEIGHT: 181 LBS | SYSTOLIC BLOOD PRESSURE: 132 MMHG

## 2022-02-28 DIAGNOSIS — C61 PROSTATE CANCER (HCC): Primary | ICD-10-CM

## 2022-02-28 PROCEDURE — 99024 POSTOP FOLLOW-UP VISIT: CPT | Performed by: UROLOGY

## 2022-02-28 NOTE — PROGRESS NOTES
Frida Ayala underwent RALP 2/17  Post op with a leak treated with a fenestrated catheter  Returns for f/u today  His WANDA output has been 10-20 cc per day for the last 2-3 days  His Dove catheter is draining copious clear yellow urine  His wife is present with him in the office today  We reviewed his pathology which reveals Sandhya 4 + 5 disease  Cancer was present in 70-80% of the total gland  Nine lymph nodes were removed in fortunately all were negative for cancer  There was extracapsular extension and there was right seminal vesicle invasion making his final T stage T3b with a focally positive surgical margin  On examination he is in no acute distress  Incisions are healing well  A Dove catheter is in place draining clear yellow urine  A WANDA drain left lower quadrant has minimal output  Impression:  Sandhya 9 prostate cancer status post robot assisted laparoscopic prostatectomy, healed anastomotic leak    Plan:  Recommend maintaining the Dove catheter to gravity drainage for 14+ days  Removal has been scheduled for early March 2022 as a nurse visit at the [facility]  His WANDA drain was removed without difficulty in the office today  We discussed reviewed his pathology report  He will return in approximately 6 weeks with his 1st postoperative PSA level  We discussed that with seminal vesicle invasion and Sandhya 9 disease that he would benefit from multimodal therapy  Once his PSA victorina is known and he has had at least 3-6 months to heal I will recommend that he is referred back to Radiation Oncology to discuss adjuvant radiation therapy  The patient is amenable with this plan

## 2022-03-07 ENCOUNTER — PROCEDURE VISIT (OUTPATIENT)
Dept: UROLOGY | Facility: CLINIC | Age: 72
End: 2022-03-07

## 2022-03-07 VITALS
RESPIRATION RATE: 20 BRPM | SYSTOLIC BLOOD PRESSURE: 160 MMHG | DIASTOLIC BLOOD PRESSURE: 90 MMHG | HEIGHT: 68 IN | HEART RATE: 72 BPM | WEIGHT: 184 LBS | BODY MASS INDEX: 27.89 KG/M2

## 2022-03-07 DIAGNOSIS — C61 PROSTATE CANCER (HCC): Primary | ICD-10-CM

## 2022-03-07 PROCEDURE — 99024 POSTOP FOLLOW-UP VISIT: CPT | Performed by: UROLOGY

## 2022-03-07 NOTE — PROGRESS NOTES
3/7/2022    Priyanka Zaidi is a 66 y o  male  [id number]    Diagnosis:  Chief Complaint     Prostate Cancer; Humphreys Removal          Patient presents for humphreys removal s/p RALP on 02/17/2022 with Dr Roger Ferrara:  Patient to keep scheduled post op appointment on 04/12/2022 with Dr Maribell Walter at the [facility] office  Procedure: Humphreys removed after deflation of intact balloon without incident  Patient tolerated procedure well      Vitals:    03/07/22 0859   BP: 160/90   Pulse: 72   Resp: 20   Weight: 83 5 kg (184 lb)   Height: 5' 8" (1 727 m)         Governor MELA Lebron

## 2022-03-24 NOTE — TELEPHONE ENCOUNTER
----- Message from Larry Dowling MD sent at 2/22/2022 10:47 AM EST -----  Regarding: ana  Please schedule the patient for hospital discharge follow-up for acute kidney injury to be seen in 2-3 weeks  Please send the patient orders for renal function panel on 3/1/22 and then again prior to the visit  Patient usually follows up with no nephrologist as an outpatient      Anthony watkins (4) no impairment

## 2022-04-04 ENCOUNTER — TELEPHONE (OUTPATIENT)
Dept: NEPHROLOGY | Facility: CLINIC | Age: 72
End: 2022-04-04

## 2022-04-04 NOTE — TELEPHONE ENCOUNTER
Appointment Confirmation   Person confirmed appointment with  If not patient, name of the person Patient     Date and time of appointment 04/05   Patient acknowledged and will be at appointment?  yes   Did you advise the patient that they will need a urine sample if they are a new patient? no   Did you advise the patient to bring their current medications for verification? (including any OTC) no   Additional Information

## 2022-04-05 ENCOUNTER — TELEPHONE (OUTPATIENT)
Dept: NEPHROLOGY | Facility: CLINIC | Age: 72
End: 2022-04-05

## 2022-04-05 PROBLEM — N18.2 CKD (CHRONIC KIDNEY DISEASE), STAGE II: Status: ACTIVE | Noted: 2022-04-05

## 2022-04-05 PROBLEM — N17.9 AKI (ACUTE KIDNEY INJURY) (HCC): Status: ACTIVE | Noted: 2022-04-05

## 2022-04-05 NOTE — TELEPHONE ENCOUNTER
Reschedule Appointment   Person speaking to  Patient   Date of original appointment 04/05/22   New appointment date 05/12/22   Patient on dialysis no   Location [place]    Provider Dr Geoff Garcia

## 2022-04-08 ENCOUNTER — APPOINTMENT (OUTPATIENT)
Dept: LAB | Facility: CLINIC | Age: 72
End: 2022-04-08
Payer: COMMERCIAL

## 2022-04-08 DIAGNOSIS — C61 PROSTATE CANCER (HCC): ICD-10-CM

## 2022-04-08 LAB
ANION GAP SERPL CALCULATED.3IONS-SCNC: 4 MMOL/L (ref 4–13)
BUN SERPL-MCNC: 16 MG/DL (ref 5–25)
CALCIUM SERPL-MCNC: 10.2 MG/DL (ref 8.3–10.1)
CHLORIDE SERPL-SCNC: 108 MMOL/L (ref 100–108)
CO2 SERPL-SCNC: 28 MMOL/L (ref 21–32)
CREAT SERPL-MCNC: 1.08 MG/DL (ref 0.6–1.3)
GFR SERPL CREATININE-BSD FRML MDRD: 68 ML/MIN/1.73SQ M
GLUCOSE P FAST SERPL-MCNC: 83 MG/DL (ref 65–99)
POTASSIUM SERPL-SCNC: 4.4 MMOL/L (ref 3.5–5.3)
PSA SERPL-MCNC: 0.3 NG/ML (ref 0–4)
SODIUM SERPL-SCNC: 140 MMOL/L (ref 136–145)

## 2022-04-08 PROCEDURE — 80048 BASIC METABOLIC PNL TOTAL CA: CPT

## 2022-04-08 PROCEDURE — 36415 COLL VENOUS BLD VENIPUNCTURE: CPT

## 2022-04-08 PROCEDURE — 84153 ASSAY OF PSA TOTAL: CPT

## 2022-04-12 ENCOUNTER — OFFICE VISIT (OUTPATIENT)
Dept: UROLOGY | Facility: CLINIC | Age: 72
End: 2022-04-12

## 2022-04-12 VITALS
BODY MASS INDEX: 26.37 KG/M2 | DIASTOLIC BLOOD PRESSURE: 90 MMHG | SYSTOLIC BLOOD PRESSURE: 146 MMHG | HEIGHT: 68 IN | WEIGHT: 174 LBS | HEART RATE: 80 BPM

## 2022-04-12 DIAGNOSIS — C61 PROSTATE CANCER (HCC): Primary | ICD-10-CM

## 2022-04-12 PROCEDURE — 99024 POSTOP FOLLOW-UP VISIT: CPT | Performed by: UROLOGY

## 2022-04-12 NOTE — PROGRESS NOTES
Analilia Minaya is a 65-year-old male with a history of prostate cancer  He underwent robot assisted laparoscopic prostatectomy performed in mid February 2022  Postoperatively he developed a urine leak  He was treated with a fenestrated catheter  His WANDA drain was ultimately removed in the office  His Dove catheter was subsequently removed after 14 days of gravity drainage  Pathology was again reviewed revealing [place] 4 + 5 disease  Cancer was present in 70-80% of the total glands  Nine lymph nodes were removed and were all negative for cancer  Extracapsular extension was noted in right seminal vesicle invasion was appreciated  A focally positive surgical margin was noted  Final T stage T3 B  He wears 1 pad mostly as a precaution  His 1st postoperative PSA is available for my review and is noted to be 0 3  On examination he is in no acute distress  His abdomen is soft nontender nondistended  Incisions are well healed at this time  Impression:  Sandhya 9 prostate cancer status post robot assisted laparoscopic prostatectomy, preoperative PSA 40, postoperative PSA 0 3, T3 B disease    Plan:  I recommend a referral to Radiation Oncology  He will benefit from adjuvant radiation therapy as he has invasion of his seminal vesicle and a detectable postoperative PSA  We discussed waiting approximately 6 months to perform radiation to allow for maximal healing  In the interim he will return in 3 months with his next PSA level

## 2022-05-06 ENCOUNTER — TELEPHONE (OUTPATIENT)
Dept: NEPHROLOGY | Facility: CLINIC | Age: 72
End: 2022-05-06

## 2022-05-06 DIAGNOSIS — N18.2 CKD (CHRONIC KIDNEY DISEASE), STAGE II: ICD-10-CM

## 2022-05-06 DIAGNOSIS — N17.9 AKI (ACUTE KIDNEY INJURY) (HCC): Primary | ICD-10-CM

## 2022-05-06 NOTE — TELEPHONE ENCOUNTER
Called patient and asked him to please have labs drawn before the follow up appointment  Patient stated he will have them drawn

## 2022-05-06 NOTE — TELEPHONE ENCOUNTER
----- Message from Donte Argueta MD sent at 5/6/2022  7:54 AM EDT -----  BMp and urine microalbumin to creatinine   ----- Message -----  From: Willie Ames  Sent: 5/5/2022   4:04 PM EDT  To: Donte Argueta MD    What labs would you like on this patient?

## 2022-05-09 ENCOUNTER — APPOINTMENT (OUTPATIENT)
Dept: LAB | Facility: CLINIC | Age: 72
End: 2022-05-09
Payer: COMMERCIAL

## 2022-05-09 DIAGNOSIS — N18.2 CKD (CHRONIC KIDNEY DISEASE), STAGE II: ICD-10-CM

## 2022-05-09 DIAGNOSIS — N17.9 AKI (ACUTE KIDNEY INJURY) (HCC): ICD-10-CM

## 2022-05-09 LAB
ANION GAP SERPL CALCULATED.3IONS-SCNC: 4 MMOL/L (ref 4–13)
BUN SERPL-MCNC: 25 MG/DL (ref 5–25)
CALCIUM SERPL-MCNC: 9.8 MG/DL (ref 8.3–10.1)
CHLORIDE SERPL-SCNC: 106 MMOL/L (ref 100–108)
CO2 SERPL-SCNC: 28 MMOL/L (ref 21–32)
CREAT SERPL-MCNC: 1.25 MG/DL (ref 0.6–1.3)
CREAT UR-MCNC: 324 MG/DL
GFR SERPL CREATININE-BSD FRML MDRD: 57 ML/MIN/1.73SQ M
GLUCOSE SERPL-MCNC: 110 MG/DL (ref 65–140)
MICROALBUMIN UR-MCNC: 24.1 MG/L (ref 0–20)
MICROALBUMIN/CREAT 24H UR: 7 MG/G CREATININE (ref 0–30)
POTASSIUM SERPL-SCNC: 4.3 MMOL/L (ref 3.5–5.3)
SODIUM SERPL-SCNC: 138 MMOL/L (ref 136–145)

## 2022-05-09 PROCEDURE — 82570 ASSAY OF URINE CREATININE: CPT

## 2022-05-09 PROCEDURE — 80048 BASIC METABOLIC PNL TOTAL CA: CPT

## 2022-05-09 PROCEDURE — 82043 UR ALBUMIN QUANTITATIVE: CPT

## 2022-05-09 PROCEDURE — 36415 COLL VENOUS BLD VENIPUNCTURE: CPT

## 2022-05-12 ENCOUNTER — OFFICE VISIT (OUTPATIENT)
Dept: NEPHROLOGY | Facility: CLINIC | Age: 72
End: 2022-05-12
Payer: COMMERCIAL

## 2022-05-12 VITALS
DIASTOLIC BLOOD PRESSURE: 70 MMHG | WEIGHT: 178 LBS | SYSTOLIC BLOOD PRESSURE: 132 MMHG | BODY MASS INDEX: 26.98 KG/M2 | HEIGHT: 68 IN

## 2022-05-12 DIAGNOSIS — I12.9 HYPERTENSIVE KIDNEY DISEASE WITH STAGE 3A CHRONIC KIDNEY DISEASE (HCC): Primary | ICD-10-CM

## 2022-05-12 DIAGNOSIS — I10 ESSENTIAL HYPERTENSION: ICD-10-CM

## 2022-05-12 DIAGNOSIS — C61 PROSTATE CANCER (HCC): ICD-10-CM

## 2022-05-12 DIAGNOSIS — N18.31 HYPERTENSIVE KIDNEY DISEASE WITH STAGE 3A CHRONIC KIDNEY DISEASE (HCC): Primary | ICD-10-CM

## 2022-05-12 PROBLEM — N17.9 AKI (ACUTE KIDNEY INJURY) (HCC): Status: RESOLVED | Noted: 2022-04-05 | Resolved: 2022-05-12

## 2022-05-12 PROCEDURE — 99214 OFFICE O/P EST MOD 30 MIN: CPT | Performed by: INTERNAL MEDICINE

## 2022-05-12 NOTE — PATIENT INSTRUCTIONS
As we discussed in the office visit, based on the most recent blood test your kidney function remains stable  Based on your previous blood test you have some mild chronic kidney problems suspected secondary to long-term history of hypertension  Recommend to continue taking the same blood pressure medications and follow low-salt diet  Remember to stay well-hydrated  Avoid NSAIDs as much as possible (no ibuprofen, Motrin, Advil, Aleve, naproxen)  Okay to take Tylenol or paracetamol or acetaminophen as needed for pain  Remember to keep a good weight  Stay physically active    Continue close follow-up with primary care doctor at the [address] [place] [address], and will be more than happy to see you back in the office if needed

## 2022-05-12 NOTE — PROGRESS NOTES
OFFICE FOLLOW UP - Nephrology   Daniel Gant 66 y o  male MRN: [id number]       ASSESSMENT and PLAN:  Yane Joy was seen today for follow-up and chronic kidney disease  Diagnoses and all orders for this visit:    Hypertensive kidney disease with stage 3a chronic kidney disease ([facility] [address] )    Essential hypertension    Prostate cancer ([facility] [address] )        This is a 74-year-old gentleman with known history of chronic kidney disease stage IIIA with previous creatinine around 1 2 on 10/2021 was per records from [facility] system a history hypertension, prostate cancer, who was recently seen by my colleagues during hospitalization on February 2021 while patient was admitted for an elective prostatectomy  Hospital course complicated with acute kidney injury, creatinine peak at 2 29 on 02/20 down to 1 1 on discharge date  Today presents to the office for hospital follow-up  1  Episode of acute kidney injury during hospitalization after elective prostatectomy, resolved  2  Chronic kidney disease stage IIIA with previous creatinine around 1 2 as per [facility] records from 10/2021  Most recent creatinine 1 25 on 05/09/2022 without microalbuminuria  CKD suspected secondary to long-term history hypertension, per patient he has hypertension since he was in high school as well as a component of age-related nephron loss  Discussed with patient about importance to have good blood pressure control, avoid NSAIDs, follow low-salt diet, stay well-hydrated  Recommend follow-up in 1 year  Patient will prefer continue follow-up with his primary care doctor at the [facility] system and he will contact us if needed  3  hypertension, blood pressure currently well controlled on lisinopril, advised to follow low-salt diet    4   History of prostate cancer status post prostatectomy on 02/2022, continue close follow-up with Urology        Patient Instructions   As we discussed in the office visit, based on the most recent blood test your kidney function remains stable  Based on your previous blood test you have some mild chronic kidney problems suspected secondary to long-term history of hypertension  Recommend to continue taking the same blood pressure medications and follow low-salt diet  Remember to stay well-hydrated  Avoid NSAIDs as much as possible (no ibuprofen, Motrin, Advil, Aleve, naproxen)  Okay to take Tylenol or paracetamol or acetaminophen as needed for pain  Remember to keep a good weight  Stay physically active  Continue close follow-up with primary care doctor at the [facility], and will be more than happy to see you back in the office if needed          HPI: Jeannette Wei is a 66 y o  male who is here for Follow-up and Chronic Kidney Disease [facility] follow up)    This is a patient history hypertension, chronic kidney disease with previous creatinine around 1 2, history of prostate cancer admitted elective prostatectomy on 02/20, hospital course complicated with acute kidney injury, patient was seen by my colleagues during the hospitalization, renal function improved  Patient returns to the office today for hospital follow-up  Patient follows at the [facility]     Currently doing well without any complaints  Patient denies any chest pain, shortness of breath or leg swelling  Denies any abdominal pain, no nausea, vomiting, no diarrhea or constipation  Denies any urinary problems, no burning sensation or gross hematuria  Takes aspirin seldom for pain once or twice a month  Quit smoking several years ago  Denies family history of kidney disease    The last blood work was done on 05/09/2022, which we have reviewed together  Recent creatinine 1 25    ROS:   All the systems were reviewed and were negative except as documented on the HPI  Allergies: Patient has no known allergies      Medications:   Current Outpatient Medications:     lisinopril (ZESTRIL) 20 mg tablet, Take 20 mg by mouth daily, Disp: , Rfl:    acetaminophen (TYLENOL) 325 mg tablet, Take 2 tablets (650 mg total) by mouth every 6 (six) hours as needed for mild pain (Patient not taking: No sig reported), Disp: 30 tablet, Rfl: 0    b complex vitamins capsule, Take 1 capsule by mouth daily (Patient not taking: No sig reported), Disp: , Rfl:     cholecalciferol (VITAMIN D3) 400 units tablet, Take 400 Units by mouth daily (Patient not taking: No sig reported), Disp: , Rfl:     COD LIVER OIL PO, Take by mouth (Patient not taking: No sig reported), Disp: , Rfl:     diphenhydrAMINE (BENADRYL) 25 mg tablet, Take 25 mg by mouth every 6 (six) hours as needed for itching   (Patient not taking: Reported on 5/12/2022), Disp: , Rfl:     docusate sodium (COLACE) 100 mg capsule, Take 1 capsule (100 mg total) by mouth 2 (two) times a day (Patient not taking: No sig reported), Disp: 30 capsule, Rfl: 0    Turmeric 500 MG CAPS, Take by mouth (Patient not taking: No sig reported), Disp: , Rfl:     Past Medical History:   Diagnosis Date    Cancer [facility])     prostate  dx 12/2021    Colon polyp     Hearing aid worn     b/l    Hypertension     Prostate cancer ([address] )     Seasonal allergies     Wears glasses      Past Surgical History:   Procedure Laterality Date    APPENDECTOMY      COLONOSCOPY      EGD      [place] LAP,PROSTATECTOMY,RADICAL,W/NERVE SPARE,INCL ROBOTIC N/A 2/17/2022    Procedure: PROSTATECTOMY RADICAL LAPAROSCOPIC  W ROBOTICS, BPLND, LAPROSCOPIC LYSIS OF ADHESIONS;  Surgeon: Adriana Riley MD;  Location: [facility] OR;  Service: Urology    PROSTATE BIOPSY      TONSILLECTOMY       Family History   Problem Relation Age of Onset    Cancer Father     Cancer Mother       reports that he quit smoking about 9 years ago  He has a 22 50 pack-year smoking history  He has never used smokeless tobacco  He reports current alcohol use of about 14 0 standard drinks of alcohol per week  He reports current drug use  Drug: Marijuana        Physical Exam:   Vitals: 05/12/22 1315   BP: 132/70   BP Location: Left arm   Patient Position: Sitting   Cuff Size: Adult   Weight: 80 7 kg (178 lb)   Height: 5' 8" (1 727 m)     Body mass index is 27 06 kg/m²  General: cooperative, in not acute distress  Eyes: conjunctivae pink, anicteric sclerae  ENT: lips and mucous membranes moist  Neck: supple, no JVD  Chest: clear breath sounds bilateral, no crackles, ronchus or wheezings  CVS: distinct S1 & S2, normal rate, regular rhythm  Abdomen: soft, non-tender, non-distended, normoactive bowel sounds  Back: no CVA tenderness  Extremities: no edema of both legs  Skin: no rash  Neuro: awake, alert, oriented      Lab Results:  Results for orders placed or performed in visit on 17/41/16   Basic metabolic panel   Result Value Ref Range    Sodium 138 136 - 145 mmol/L    Potassium 4 3 3 5 - 5 3 mmol/L    Chloride 106 100 - 108 mmol/L    CO2 28 21 - 32 mmol/L    ANION GAP 4 4 - 13 mmol/L    BUN 25 5 - 25 mg/dL    Creatinine 1 25 0 60 - 1 30 mg/dL    Glucose 110 65 - 140 mg/dL    Calcium 9 8 8 3 - 10 1 mg/dL    eGFR 57 ml/min/1 73sq m   Microalbumin / creatinine urine ratio   Result Value Ref Range    Creatinine, Ur 324 0 mg/dL    Microalbum  ,U,Random 24 1 (H) 0 0 - 20 0 mg/L    Microalb Creat Ratio 7 0 - 30 mg/g creatinine       Results from last 7 days   Lab Units 05/09/22  0946   SODIUM mmol/L 138   POTASSIUM mmol/L 4 3   CHLORIDE mmol/L 106   CO2 mmol/L 28   BUN mg/dL 25   CREATININE mg/dL 1 25   CALCIUM mg/dL 9 8           Portions of the record may have been created with voice recognition software  Occasional wrong word or "sound a like" substitutions may have occurred due to the inherent limitations of voice recognition software  Read the chart carefully and recognize, using context, where substitutions have occurred  If you have any questions, please contact the dictating provider

## 2022-07-26 ENCOUNTER — APPOINTMENT (OUTPATIENT)
Dept: LAB | Facility: CLINIC | Age: 72
End: 2022-07-26
Payer: COMMERCIAL

## 2022-07-26 DIAGNOSIS — C61 PROSTATE CANCER (HCC): ICD-10-CM

## 2022-07-26 LAB — PSA SERPL-MCNC: 0.6 NG/ML (ref 0–4)

## 2022-07-26 PROCEDURE — 84153 ASSAY OF PSA TOTAL: CPT

## 2022-07-27 ENCOUNTER — OFFICE VISIT (OUTPATIENT)
Dept: UROLOGY | Facility: CLINIC | Age: 72
End: 2022-07-27
Payer: COMMERCIAL

## 2022-07-27 VITALS
BODY MASS INDEX: 26.52 KG/M2 | DIASTOLIC BLOOD PRESSURE: 60 MMHG | HEIGHT: 68 IN | SYSTOLIC BLOOD PRESSURE: 110 MMHG | HEART RATE: 72 BPM | WEIGHT: 175 LBS

## 2022-07-27 DIAGNOSIS — C61 PROSTATE CANCER (HCC): Primary | ICD-10-CM

## 2022-07-27 PROCEDURE — 99214 OFFICE O/P EST MOD 30 MIN: CPT | Performed by: UROLOGY

## 2022-07-27 NOTE — PROGRESS NOTES
7/27/2022    Joceline Lance  1950  [id number]        Assessment  History of [place] 4+5=9/10 prostate cancer, status post robot assisted laparoscopic prostatectomy, T3 B disease with right seminal vesicle invasion, focally positive surgical margin      Discussion  I had a discussion with Yamileth Hardy in the office today regarding his PSA of 0 6 and his post prostatectomy poor pathologic features including [place] 9 disease with a focally positive surgical margin as well as right seminal vesicle invasion  Fortunately he is completely dry since surgery  I recommend that he begin external beam radiation therapy with a goal to completely treat his disease  He is scheduled to see Dr Deandre Lott from Radiation Oncology in the near future  He will return in approximately 4 months time with his next PSA level  Hopefully he will either be receiving or completed radiation therapy by that time  History of Present Illness  66 y o  male with a history of [place] 9 prostate cancer  He underwent robot assisted laparoscopic prostatectomy in February of 2022  He developed a postoperative anastomotic leak which ultimately healed  Pathology revealed T3 B disease with invasion into the right seminal vesicle  His 1st postoperative PSA was 0 3  His most recent postoperative PSA is noted to be 0 6  He returns  for routine follow-up today  We again discussed his pathology as well as his postoperative PSA  Although his preoperative PSA was 40, we discussed that his postoperative PSA should be undetectable  We discussed adjuvant radiation therapy at length in the office today  Fortunately he is dry and does not wear pads  AUA Symptom Score  AUA SYMPTOM SCORE    Flowsheet Row Most Recent Value   AUA SYMPTOM SCORE    How often have you had a sensation of not emptying your bladder completely after you finished urinating? 0   How often have you had to urinate again less than two hours after you finished urinating?  2   How often have you found you stopped and started again several times when you urinate? 0   How often have you found it difficult to postpone urination? 0   How often have you had a weak urinary stream? 1   How often have you had to push or strain to begin urination? 0   How many times did you most typically get up to urinate from the time you went to bed at night until the time you got up in the morning? 5   Quality of Life: If you were to spend the rest of your life with your urinary condition just the way it is now, how would you feel about that? 2   AUA SYMPTOM SCORE 8          Review of Systems  Review of Systems   Constitutional: Negative  HENT: Negative  Eyes: Negative  Respiratory: Negative  Cardiovascular: Negative  Gastrointestinal: Negative  Endocrine: Negative  Genitourinary:        Per HPI   Musculoskeletal: Negative  Skin: Negative  Allergic/Immunologic: Negative  Neurological: Negative  Hematological: Negative  Psychiatric/Behavioral: Negative            Past Medical History  Past Medical History:   Diagnosis Date    Cancer [facility])     prostate  dx 12/2021    Colon polyp     Hearing aid worn     b/l    Hypertension     Prostate cancer ([address] )     Seasonal allergies     Wears glasses        Past Social History  Past Surgical History:   Procedure Laterality Date    APPENDECTOMY      COLONOSCOPY      EGD      [place] LAP,PROSTATECTOMY,RADICAL,W/NERVE SPARE,INCL ROBOTIC N/A 2/17/2022    Procedure: PROSTATECTOMY RADICAL LAPAROSCOPIC  W ROBOTICS, BPLND, LAPROSCOPIC LYSIS OF ADHESIONS;  Surgeon: Jennifer Fleming MD;  Location: [facility] OR;  Service: Urology    PROSTATE BIOPSY      TONSILLECTOMY         Past Family History  Family History   Problem Relation Age of Onset    Cancer Father     Cancer Mother        Past Social history  Social History     Socioeconomic History    Marital status: /Civil Union     Spouse name: Not on file    Number of children: Not on file  Years of education: Not on file    Highest education level: Not on file   Occupational History    Not on file   Tobacco Use    Smoking status: Former Smoker     Packs/day: 0 50     Years: 45 00     Pack years: 22 50     Quit date:      Years since quittin 5    Smokeless tobacco: Never Used   Vaping Use    Vaping Use: Never used   Substance and Sexual Activity    Alcohol use: Yes     Alcohol/week: 14 0 standard drinks     Types: 14 Standard drinks or equivalent per week     Comment: Beer and liquor daily-average 2-3 drinks/day       Drug use: Yes     Types: Marijuana     Comment: marijuana   smokes  1 x daily  LD 2022    Sexual activity: Not Currently   Other Topics Concern    Not on file   Social History Narrative    Not on file     Social Determinants of Health     Financial Resource Strain: Not on file   Food Insecurity: Not on file   Transportation Needs: Not on file   Physical Activity: Not on file   Stress: Not on file   Social Connections: Not on file   Intimate Partner Violence: Not on file   Housing Stability: Not on file       Current Medications  Current Outpatient Medications   Medication Sig Dispense Refill    diphenhydrAMINE (BENADRYL) 25 mg tablet Take 25 mg by mouth every 6 (six) hours as needed for itching      lisinopril (ZESTRIL) 20 mg tablet Take 20 mg by mouth daily      acetaminophen (TYLENOL) 325 mg tablet Take 2 tablets (650 mg total) by mouth every 6 (six) hours as needed for mild pain (Patient not taking: No sig reported) 30 tablet 0    b complex vitamins capsule Take 1 capsule by mouth daily (Patient not taking: No sig reported)      cholecalciferol (VITAMIN D3) 400 units tablet Take 400 Units by mouth daily (Patient not taking: No sig reported)      COD LIVER OIL PO Take by mouth (Patient not taking: No sig reported)      docusate sodium (COLACE) 100 mg capsule Take 1 capsule (100 mg total) by mouth 2 (two) times a day (Patient not taking: No sig reported) 30 capsule 0    Turmeric 500 MG CAPS Take by mouth (Patient not taking: No sig reported)       No current facility-administered medications for this visit  Allergies  No Known Allergies    Past Medical History, Social History, Family History, medications and allergies were reviewed  Vitals  Vitals:    07/27/22 1539   BP: 110/60   Pulse: 72   Weight: 79 4 kg (175 lb)   Height: 5' 8" (1 727 m)       Physical Exam  Physical Exam  On examination he is in no acute distress  His abdomen is soft nontender nondistended  Incisions are well healed  Skin is warm  Extremities without edema    Neurologic is grossly intact and nonfocal   Gait normal   Affect normal    Results  Lab Results   Component Value Date    PSA 0 6 07/26/2022    PSA 0 3 04/08/2022     Lab Results   Component Value Date    CALCIUM 9 8 05/09/2022    K 4 3 05/09/2022    CO2 28 05/09/2022     05/09/2022    BUN 25 05/09/2022    CREATININE 1 25 05/09/2022     Lab Results   Component Value Date    WBC 13 84 (H) 02/20/2022    HGB 11 1 (L) 02/20/2022    HCT 32 1 (L) 02/20/2022     (H) 02/20/2022     02/22/2022         Office Urine Dip  No results found for this or any previous visit (from the past 1 hour(s)) ]

## 2022-07-27 NOTE — LETTER
July 27, 2022     Dave Gray MD  [facility] [address] [id number]    Patient: Brayan Toth   YOB: 1950   Date of Visit: 7/27/2022       Dear Dr Nickie Goemz:    Thank you for referring Brayna Toth to me for evaluation  Below are my notes for this consultation  If you have questions, please do not hesitate to call me  I look forward to following your patient along with you  Sincerely,        Robert Das MD        CC: JENNIFER Gillespie MD  7/27/2022  4:27 PM  Sign when Signing Visit  7/27/2022    Brayan Toth  1950  [id number]        Assessment  History of [place] 4+5=9/10 prostate cancer, status post robot assisted laparoscopic prostatectomy, T3 B disease with right seminal vesicle invasion, focally positive surgical margin      Discussion  I had a discussion with Kingsley Saavedra in the office today regarding his PSA of 0 6 and his post prostatectomy poor pathologic features including [place] 9 disease with a focally positive surgical margin as well as right seminal vesicle invasion  Fortunately he is completely dry since surgery  I recommend that he begin external beam radiation therapy with a goal to completely treat his disease  He is scheduled to see Dr Nickie Gomez from Radiation Oncology in the near future  He will return in approximately 4 months time with his next PSA level  Hopefully he will either be receiving or completed radiation therapy by that time  History of Present Illness  66 y o  male with a history of [place] 9 prostate cancer  He underwent robot assisted laparoscopic prostatectomy in February of 2022  He developed a postoperative anastomotic leak which ultimately healed  Pathology revealed T3 B disease with invasion into the right seminal vesicle  His 1st postoperative PSA was 0 3  His most recent postoperative PSA is noted to be 0 6  He returns  for routine follow-up today    We again discussed his pathology as well as his postoperative PSA  Although his preoperative PSA was 40, we discussed that his postoperative PSA should be undetectable  We discussed adjuvant radiation therapy at length in the office today  Fortunately he is dry and does not wear pads  AUA Symptom Score  AUA SYMPTOM SCORE    Flowsheet Row Most Recent Value   AUA SYMPTOM SCORE    How often have you had a sensation of not emptying your bladder completely after you finished urinating? 0   How often have you had to urinate again less than two hours after you finished urinating? 2   How often have you found you stopped and started again several times when you urinate? 0   How often have you found it difficult to postpone urination? 0   How often have you had a weak urinary stream? 1   How often have you had to push or strain to begin urination? 0   How many times did you most typically get up to urinate from the time you went to bed at night until the time you got up in the morning? 5   Quality of Life: If you were to spend the rest of your life with your urinary condition just the way it is now, how would you feel about that? 2   AUA SYMPTOM SCORE 8          Review of Systems  Review of Systems   Constitutional: Negative  HENT: Negative  Eyes: Negative  Respiratory: Negative  Cardiovascular: Negative  Gastrointestinal: Negative  Endocrine: Negative  Genitourinary:        Per HPI   Musculoskeletal: Negative  Skin: Negative  Allergic/Immunologic: Negative  Neurological: Negative  Hematological: Negative  Psychiatric/Behavioral: Negative            Past Medical History  Past Medical History:   Diagnosis Date    Cancer [facility])     prostate  dx 12/2021    Colon polyp     Hearing aid worn     b/l    Hypertension     Prostate cancer ([facility] [address] )     Seasonal allergies     Wears glasses        Past Social History  Past Surgical History:   Procedure Laterality Date    APPENDECTOMY      COLONOSCOPY      EGD      [place] LAP,PROSTATECTOMY,RADICAL,W/NERVE SPARE,INCL ROBOTIC N/A 2022    Procedure: PROSTATECTOMY RADICAL LAPAROSCOPIC  W ROBOTICS, BPLND, LAPROSCOPIC LYSIS OF ADHESIONS;  Surgeon: Timothy Christian MD;  Location: [facility];  Service: Urology    PROSTATE BIOPSY      TONSILLECTOMY         Past Family History  Family History   Problem Relation Age of Onset    Cancer Father     Cancer Mother        Past Social history  Social History     Socioeconomic History    Marital status: /Civil Union     Spouse name: Not on file    Number of children: Not on file    Years of education: Not on file    Highest education level: Not on file   Occupational History    Not on file   Tobacco Use    Smoking status: Former Smoker     Packs/day: 0 50     Years: 45 00     Pack years: 22 50     Quit date:      Years since quittin 5    Smokeless tobacco: Never Used   Vaping Use    Vaping Use: Never used   Substance and Sexual Activity    Alcohol use: Yes     Alcohol/week: 14 0 standard drinks     Types: 14 Standard drinks or equivalent per week     Comment: Beer and liquor daily-average 2-3 drinks/day       Drug use: Yes     Types: Marijuana     Comment: marijuana   smokes  1 x daily  LD 2022    Sexual activity: Not Currently   Other Topics Concern    Not on file   Social History Narrative    Not on file     Social Determinants of Health     Financial Resource Strain: Not on file   Food Insecurity: Not on file   Transportation Needs: Not on file   Physical Activity: Not on file   Stress: Not on file   Social Connections: Not on file   Intimate Partner Violence: Not on file   Housing Stability: Not on file       Current Medications  Current Outpatient Medications   Medication Sig Dispense Refill    diphenhydrAMINE (BENADRYL) 25 mg tablet Take 25 mg by mouth every 6 (six) hours as needed for itching      lisinopril (ZESTRIL) 20 mg tablet Take 20 mg by mouth daily      acetaminophen (TYLENOL) 325 mg tablet Take 2 tablets (650 mg total) by mouth every 6 (six) hours as needed for mild pain (Patient not taking: No sig reported) 30 tablet 0    b complex vitamins capsule Take 1 capsule by mouth daily (Patient not taking: No sig reported)      cholecalciferol (VITAMIN D3) 400 units tablet Take 400 Units by mouth daily (Patient not taking: No sig reported)      COD LIVER OIL PO Take by mouth (Patient not taking: No sig reported)      docusate sodium (COLACE) 100 mg capsule Take 1 capsule (100 mg total) by mouth 2 (two) times a day (Patient not taking: No sig reported) 30 capsule 0    Turmeric 500 MG CAPS Take by mouth (Patient not taking: No sig reported)       No current facility-administered medications for this visit  Allergies  No Known Allergies    Past Medical History, Social History, Family History, medications and allergies were reviewed  Vitals  Vitals:    07/27/22 1539   BP: 110/60   Pulse: 72   Weight: 79 4 kg (175 lb)   Height: 5' 8" (1 727 m)       Physical Exam  Physical Exam  On examination he is in no acute distress  His abdomen is soft nontender nondistended  Incisions are well healed  Skin is warm  Extremities without edema    Neurologic is grossly intact and nonfocal   Gait normal   Affect normal    Results  Lab Results   Component Value Date    PSA 0 6 07/26/2022    PSA 0 3 04/08/2022     Lab Results   Component Value Date    CALCIUM 9 8 05/09/2022    K 4 3 05/09/2022    CO2 28 05/09/2022     05/09/2022    BUN 25 05/09/2022    CREATININE 1 25 05/09/2022     Lab Results   Component Value Date    WBC 13 84 (H) 02/20/2022    HGB 11 1 (L) 02/20/2022    HCT 32 1 (L) 02/20/2022     (H) 02/20/2022     02/22/2022         Office Urine Dip  No results found for this or any previous visit (from the past 1 hour(s)) ]

## 2022-07-28 ENCOUNTER — PATIENT OUTREACH (OUTPATIENT)
Dept: HEMATOLOGY ONCOLOGY | Facility: CLINIC | Age: 72
End: 2022-07-28

## 2022-07-28 DIAGNOSIS — C61 PROSTATE CANCER (HCC): Primary | ICD-10-CM

## 2022-07-29 ENCOUNTER — PATIENT OUTREACH (OUTPATIENT)
Dept: CASE MANAGEMENT | Facility: OTHER | Age: 72
End: 2022-07-29

## 2022-07-29 NOTE — PROGRESS NOTES
Biopsychosocial and Barriers Assessment    Cancer Diagnosis: Prostrate  cancer  Home/Cell Phone: [phone]  Emergency Contact: Spouse- Krystyna Oliver  Marital Status:   Interpretation concerns, speaks another language, preferred language: English  Cultural concerns: none  Ability to read or write: yes    Caregiver/Support: none  Children: 1 son and 1 step son and 1 step daughter  Child/Elder care: none    [address] [facility] [address]: 2 steps to enter  Lives With: Spouse  Daily Living Activities: independent   [address]: none  Ambulation: independent    [facility] with insurance: [facility]  High co-pays with medication coverage: none  No medication coverage: n/a    Primary Care Provider: [address]  Hx of Home Health Care: none  Hx of Short term rehab: none  Mental Health Hx: none  Substance Abuse Hx: none  Employment: Retired-   [place] Status/Location: [employer]-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    Ability to pay bills: yes  POA/LW/AD: not addressed  Transportation Plan/Concerns: independent      What do you know about your Cancer Diagnosis    What has your doctor told you about your cancer diagnosis: Pt states he needs radiation  What has your doctor told you about your cancer treatment: " I will need 8 weeks of radiation, which will be 40 treatments "    What specific concerns do you have about your diagnosis and treatment: Pt states "I am just rolling with the punches "    Have you been made aware of any hair loss associated with treatment: n/a    Additional Comments:    OSW made outreach call to pt this afternoon  OSW introduced self and role  Pt has prostrate cancer and had a prostatectomy,T3 B disease with right seminal vesicle invasion  He is well supported by his wife, friends and he has a son  Pt is independent in his ADL's  Pt states that he will be receiving radiation treatments   He is scheduled for 8 weeks-5x per day  Pt is able to drive himself  He does not identify any needs at this time, therefore he will be closed out  If there are any needs in the future a OSW referral can be placed  OSW encouraged pt to call if any needs arise in the future  OSW provided contact information and pt was appreciative

## 2022-08-08 ENCOUNTER — HOSPITAL ENCOUNTER (OUTPATIENT)
Dept: RADIOLOGY | Age: 72
Discharge: HOME | End: 2022-08-08
Payer: COMMERCIAL

## 2022-08-08 DIAGNOSIS — C61 PROSTATE CANCER (HCC): ICD-10-CM

## 2022-08-08 PROCEDURE — 78815 PET IMAGE W/CT SKULL-THIGH: CPT

## 2022-08-08 PROCEDURE — G1004 CDSM NDSC: HCPCS

## 2022-08-08 PROCEDURE — A9595 HB PIFLUFOLASTAT F-18, DIAGNOSTIC, 1 MILLICURIE: HCPCS

## 2022-08-09 ENCOUNTER — TELEPHONE (OUTPATIENT)
Dept: UROLOGY | Facility: AMBULATORY SURGERY CENTER | Age: 72
End: 2022-08-09

## 2022-08-09 NOTE — TELEPHONE ENCOUNTER
Reyes Gregory from Radiology calling with Significant findings on Petscan done 08/08/22, ordered by Dr Yancy Casiano

## 2022-08-19 ENCOUNTER — RADIATION ONCOLOGY CONSULT (OUTPATIENT)
Dept: RADIATION ONCOLOGY | Facility: CLINIC | Age: 72
End: 2022-08-19
Attending: INTERNAL MEDICINE
Payer: COMMERCIAL

## 2022-08-19 VITALS
RESPIRATION RATE: 16 BRPM | HEIGHT: 68 IN | OXYGEN SATURATION: 98 % | TEMPERATURE: 98.8 F | BODY MASS INDEX: 26.13 KG/M2 | SYSTOLIC BLOOD PRESSURE: 146 MMHG | DIASTOLIC BLOOD PRESSURE: 83 MMHG | WEIGHT: 172.4 LBS | HEART RATE: 93 BPM

## 2022-08-19 DIAGNOSIS — C61 PROSTATE CANCER (HCC): Primary | ICD-10-CM

## 2022-08-19 PROCEDURE — 77263 THER RADIOLOGY TX PLNG CPLX: CPT | Performed by: INTERNAL MEDICINE

## 2022-08-19 PROCEDURE — 99204 OFFICE O/P NEW MOD 45 MIN: CPT | Performed by: INTERNAL MEDICINE

## 2022-08-19 RX ORDER — FLUTICASONE PROPIONATE 50 MCG
1 SPRAY, SUSPENSION (ML) NASAL
COMMUNITY
Start: 2022-08-17

## 2022-08-19 RX ORDER — MONTELUKAST SODIUM 10 MG/1
1 TABLET ORAL DAILY
COMMUNITY
Start: 2022-08-17

## 2022-08-19 NOTE — PROGRESS NOTES
Consultation - Radiation Oncology      Patient Name: Patsy Dolan [id number]:[id number] : 1950  Encounter: [id number]  Referring Provider: Jorge Pian,*    ASSESSMENT  Cancer Staging  Prostate cancer [facility])  Staging form: Prostate, AJCC 8th Edition  - Clinical stage from 2021: Stage IIIC (cT2a, cN0, cM0, PSA: 45 5, Grade Group: 5) - Signed by Adonis Mejias MD on 2022  Prostate specific antigen (PSA) range: 20 or greater  Sandhya primary pattern: 4  Selkirk secondary pattern: 5  Histologic grading system: 5 grade system    PLAN  Patsy Dolan is a 66 y o  male with 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 (sN2wC0Y3, [place] 4+5, PSA 45 46) s/p RALP on 22 with Dr Francisco Harkins, pathology notable for pT3bN0 disease (Selkirk 5+4, +EPE, +SVI (right), +LVI, and multifocal positive margins, with 0/18 LN involved)  His post-RP PSA at 2 months was 0 3 and at 5 months was 0 6  PSMA PET on 22 showed no suspicious uptake at the prostate bed or abdomen/pelvis  He was referred for adjuvant radiation therapy for persistent PSA  We had a long and detailed discussion regarding the patient's diagnosis and his treatment options  I recommended postoperative radiotherapy ± short term ADT  I described a typical course of radiation therapy (70 2 Gy to the prostate bed and 45 Gy to the pelvic lymph nodes)  We discussed the simulation and treatment planning process  The possible short-term side effects include, but are not limited to, fatigue, dermatitis, dysuria, urinary frequency/urgency, rectal urgency/diarrhea/cramping, and sexual dysfunction  The possible long-term side effects include, but are not limited to, radiation cystitis, urethral stricture, radiation proctitis, bowel obstruction, fistula, and secondary malignancy  We also discussed that even with appropriate therapy, there is still a risk of disease recurrence   The patient asked a number of questions that were answered to his apparent satisfaction  I have also discussed the recently published findings of the [address] trial/RTOG [id number], suggesting benefit to short term ADT in this setting  Traditionally, ADT is offered for PSA >0 7 based on subgroup analysis of RTOG [address] et al)  NCCN guidelines suggest no salvage ADT for older men with prolonged PSADTs (>12 months)  With his PSA of 0 6, unique 5+4 disease, multiple pathologic risk features, PSADT of ~3 months, and overall good health, I think he may benefit from short term ADT  I will review with Dr Milan Rios if he is agreable  The patient agreed to proceed with radiation therapy, and informed consent was signed  CT simulation and treatment scheduled at [facility], per his preference  Thank you for the opportunity to participate in the care of this patient  Young Kelley MD  Department of [address] [place] [address]    Orders Placed This Encounter   Procedures    Radiation Simulation Treatment     1  Prostate cancer [facility])  Radiation Simulation Treatment       CHIEF COMPLAINT  Chief Complaint   Patient presents with    Consult       History of Present Illness  Sylvia Ahumada 1950 is a 66 y o  male H/O unique 9 (4+5) prostate cancer, originally diagnosed 12/2021  PSA at time of diagnosis was 45 5  Seen in consult previously but elected treatment  with robotic prostatectomy which was completed 2/17/22  Presents today for consult post prostatectomy for focally positive surgical margin and right seminal vesicle invasion     2/17/22  Tissue Exam  A   Prostate gland and seminal vesicles:     - Acinar type prostatic adenocarcinoma, Lancaster's sum 4 + 5 = 9, Prognostic grade group 5      - Tertiary pattern 3 also identified      - Multifocal extraprostatic extension by tumor present      - Tumor involves the right seminal vesicle (confirmed by NKX3 1 immunostain)       - Perineural invasion by tumor present      - Lymphatic channel invasion by tumor identified      - Focus of intraductal carcinoma      - High grade prostatic intraepithelial neoplasia (HGPIN)     - Tumor involves the following margins:       -- Right and left apex        -- Left anterior and posterior        -- Right anterior and posterior    B   Right pelvic lymph nodes:     - Nine lymph nodes identified; all negative for malignancy (0/9)   C  Left pelvic lymph nodes:     - Nine lymph nodes identified; all negative for malignancy (0/9)     4/12/22  Dr Vasu Grimm  No acute distress  Recommend referral to radiation oncology  Discussed waiting 6 months to allow for maximal healing, in the interim will return in 3 months with next PSA level           Lab Results   Component Value Date     PSA 0 6 07/26/2022     PSA 0 3 04/08/2022 7/27/22 Dr Vasu Grimm  Discussed that post operative PSA should be undetectable  Has upcoming appointment with radiation oncology  Will f/u in 4 months with PSA      8/8/22 PSMA PET/CT Scan  IMPRESSION:  1   No suspicious radiotracer uptake at the prostate bed   No PSMA avid lesions in the abdomen and pelvis  2   Mild radiotracer uptake in the right perihilar region, nonspecific, could be reactive   This can be reassessed on follow-up  3   Mild patchy radiotracer uptake at the distal sacrum/coccyx, nonspecific   No obvious findings on limited CT   Correlate for any recent trauma in this region, developing metastasis is not entirely excluded   This could be further evaluated with dedicated MRI or reassessed on follow up PSMA PET  Today, the patient feels well overall  He has no incontinence  His IPSS score is 2  His bowel habits are regular albeit loose at times  He reports that erectile function is not a concern at this time  Oncology History   Prostate cancer ([facility] )   12/1/2021 Initial Diagnosis    [facility]state [facility] ([facility] )     12/1/2021 Biopsy    A   Prostate, right lateral base:  - Prostatic adenocarcinoma, Sawyer score 7, 4+3, Prognostic Grade Group 3, continuously involving 10% of 1 of 1 core  - Periprostatic fat invasion:Not identified  - Lymph-vascular invasion:  not identified  - Perineural invasion: identified  - Additional Pathologic Findings: none identified     B  Prostate, right lateral mid:  - Prostatic adenocarcinoma, Sandhya score 9, 4+5, Prognostic Grade Group 5, continuously involving 100% of 1 of 1 core  - Percentage pattern 4: 70%  - Percentage pattern 5: 5%  - Periprostatic fat invasion:Not identified  - Lymph-vascular invasion:  not identified  - Perineural invasion:Not identified  - Additional Pathologic Findings:  high-grade prostatic intraepithelial neoplasia     C: Prostate, right lateral apex:  - Prostatic adenocarcinoma, Av[place] score 9, 4+5, Prognostic Grade Group 5, continuously involving 100% of 1 of 1 core  - Percentage pattern 4: 60%  - Percentage pattern 5: 10%  - Periprostatic fat invasion:Not identified  - Lymph-vascular invasion:  not identified  - Perineural invasion: identified  - Additional Pathologic Findings:  high-grade prostatic intraepithelial neoplasia      D  Prostate, right base:  - Prostatic adenocarcinoma, [place] score 7, 4+3, Prognostic Grade Group 3, continuously involving 10% of 1 of 2 cores  - Periprostatic fat invasion:Not identified  - Lymph-vascular invasion:  not identified  - Perineural invasion: identified  - Additional Pathologic Findings: high-grade prostatic intraepithelial neoplasia      E  Prostate, right mid:  - Prostatic adenocarcinoma, Sandhya score 9, 4+5, Prognostic Grade Group 5, continuously involving 100% of 1 of 1 core    - Percentage pattern 4: 60%  - Percentage pattern 5: 10%  - Periprostatic fat invasion:Not identified  - Lymph-vascular invasion:  not identified  - Perineural invasion: identified  - Additional Pathologic Findings:  high-grade prostatic intraepithelial neoplasia       F  Prostate, right apex:  - Prostatic adenocarcinoma, [place] score 9, 4+5, Prognostic Grade Group 5, continuously involving 100% of 1 of 1 core  - Percentage pattern 4: 80%  - Percentage pattern 5: 10%  - Periprostatic fat invasion:Not identified  - Lymph-vascular invasion:  not identified  - Perineural invasion: identified  - Additional Pathologic Findings:  high-grade prostatic intraepithelial neoplasia       G  Prostate, left lateral base:  - Prostatic adenocarcinoma, Seymour score 8, 4+4, Prognostic Grade Group 4, continuously involving 70% of 1 of 1 core  - Periprostatic fat invasion:Not identified  - Lymph-vascular invasion:  not identified  - Perineural invasion:Not identified  - Additional Pathologic Findings:  high-grade prostatic intraepithelial neoplasia       H  Prostate, left lateral mid:  - Prostatic adenocarcinoma, Sandhya score 8, 4+4, Prognostic Grade Group 4, continuously involving 70% of 1 of 1 core  - Periprostatic fat invasion:Not identified  - Lymph-vascular invasion:  not identified  - Perineural invasion:Not identified  - Additional Pathologic Findings:  high-grade prostatic intraepithelial neoplasia        I  Prostate, left lateral apex:  - Prostatic adenocarcinoma, Seymour score 8, 4+4, Prognostic Grade Group 4, continuously involving 100% of 1 of 1 core  - Periprostatic fat invasion:Not identified  - Lymph-vascular invasion:  not identified  - Perineural invasion:Not identified  - Additional Pathologic Findings:  high-grade prostatic intraepithelial neoplasia       J  Prostate, left base:  - Prostatic adenocarcinoma, Seymour score 8, 4+4, Prognostic Grade Group 4, continuously involving 60% of 1 of 1 core  - Periprostatic fat invasion:Not identified  - Lymph-vascular invasion:  not identified  - Perineural invasion:Not identified  - Additional Pathologic Findings:  high-grade prostatic intraepithelial neoplasia       K  Prostate, left mid:  - Prostatic adenocarcinoma, Seymour score 8, 4+4, Prognostic Grade Group 4, continuously involving 30% of 1 of 1 core    - Periprostatic fat invasion:Not identified  - Lymph-vascular invasion:  not identified  - Perineural invasion:Not identified  - Additional Pathologic Findings: None identified       L  Prostate, left apex:  - Prostatic adenocarcinoma, Sandhya score 9, 4+5, Prognostic Grade Group 5, continuously involving 50% of 1 of 1 core  - Percentage pattern 4: 70%  - Percentage pattern 5: 10%  - Periprostatic fat invasion:Not identified  - Lymph-vascular invasion:  not identified  - Perineural invasion: identified  - Additional Pathologic Findings:  high-grade prostatic intraepithelial neoplasia        12/1/2021 -  Cancer Staged    Staging form: Prostate, AJCC 8th Edition  - Clinical stage from 12/1/2021: Stage IIIC (cT2a, cN0, cM0, PSA: 45 5, Grade Group: 5) - Signed by Forrest Donnelly MD on 1/25/2022  Prostate specific antigen (PSA) range: 20 or greater  [place] primary pattern: 4  Sandhya secondary pattern: 5  Histologic grading system: 5 grade system         Historical Information   Past Medical History:   Diagnosis Date    Cancer [facility])     prostate  dx 12/2021    Colon polyp     Hearing aid worn     b/l    Hypertension     Prostate cancer ([address] )     Seasonal allergies     Wears glasses      Past Surgical History:   Procedure Laterality Date    APPENDECTOMY      COLONOSCOPY      EGD      FL LAP,PROSTATECTOMY,RADICAL,W/NERVE SPARE,INCL ROBOTIC N/A 2/17/2022    Procedure: PROSTATECTOMY RADICAL LAPAROSCOPIC  W ROBOTICS, BPLND, LAPROSCOPIC LYSIS OF ADHESIONS;  Surgeon: Jamia Claudio MD;  Location: [facility];  Service: Urology    PROSTATE BIOPSY      TONSILLECTOMY       Family History   Problem Relation Age of Onset    Cancer Father     Cancer Mother      Social History   Social History     Substance and Sexual Activity   Alcohol Use Yes    Alcohol/week: 14 0 standard drinks    Types: 14 Standard drinks or equivalent per week    Comment: Beer and liquor daily-average 2-3 drinks/day        Social History     Substance and Sexual Activity   Drug Use Yes    Types: Marijuana    Comment: marijuana   smokes  1 x daily  LD 2022     Social History     Tobacco Use   Smoking Status Former Smoker    Packs/day: 0 50    Years: 45 00    Pack years: 22 50    Types: Pipe    Quit date:     Years since quittin 6   Smokeless Tobacco Never Used     Meds/Allergies     Current Outpatient Medications:     diphenhydrAMINE (BENADRYL) 25 mg tablet, Take 25 mg by mouth every 6 (six) hours as needed for itching, Disp: , Rfl:     fluticasone (FLONASE) 50 mcg/act nasal spray, 1 spray into each nostril, Disp: , Rfl:     lisinopril (ZESTRIL) 20 mg tablet, Take 20 mg by mouth daily, Disp: , Rfl:     montelukast (SINGULAIR) 10 mg tablet, Take 1 tablet by mouth daily, Disp: , Rfl:     acetaminophen (TYLENOL) 325 mg tablet, Take 2 tablets (650 mg total) by mouth every 6 (six) hours as needed for mild pain (Patient not taking: No sig reported), Disp: 30 tablet, Rfl: 0    b complex vitamins capsule, Take 1 capsule by mouth daily (Patient not taking: No sig reported), Disp: , Rfl:     cholecalciferol (VITAMIN D3) 400 units tablet, Take 400 Units by mouth daily (Patient not taking: No sig reported), Disp: , Rfl:     COD LIVER OIL PO, Take by mouth (Patient not taking: No sig reported), Disp: , Rfl:     docusate sodium (COLACE) 100 mg capsule, Take 1 capsule (100 mg total) by mouth 2 (two) times a day (Patient not taking: No sig reported), Disp: 30 capsule, Rfl: 0    Turmeric 500 MG CAPS, Take by mouth (Patient not taking: No sig reported), Disp: , Rfl:   No Known Allergies    Lab Results/Imaging Studies       Chemistry        Component Value Date/Time    K 4 3 2022 0946     2022 0946    CO2 28 2022 0946    BUN 25 2022 0946    CREATININE 1 25 2022 0946        Component Value Date/Time    CALCIUM 9 8 2022 0946    ALKPHOS 78 2022 0527    AST 18 2022 0527    ALT 27 2022 0527          Lab Results   Component Value Date    WBC 13 84 (H) 02/20/2022    HGB 11 1 (L) 02/20/2022    HCT 32 1 (L) 02/20/2022     (H) 02/20/2022     02/22/2022     Imaging Studies  NM PET CT skull base to mid thigh    Result Date: 8/9/2022  Narrative: PYLARIFY PSMA PET/CT SCAN INDICATION: History of prostate cancer status post prostatectomy 2/17/2022  Rising PSA  C61: Malignant neoplasm of prostate MODIFIER: PS COMPARISON: MRI abdomen 2022, CT abdomen pelvis 1/20/2022, bone scan 1/19/2022 CELL TYPE:  Prostate adenocarcinoma TECHNIQUE:   9 6 mCi Pylarify PSMA administered IV  Multiplanar attenuation corrected and non attenuation corrected PET images were acquired 60 minutes post injection  Contiguous, low dose, axial CT sections were obtained from the vertex through the femurs   Intravenous or oral contrast was not utilized  This examination, like all CT scans performed in the [facility], was performed utilizing techniques to minimize radiation dose exposure, including the use of iterative reconstruction and automated exposure control  FINDINGS: VISUALIZED BRAIN:   No acute abnormalities are seen  HEAD/NECK:   There is a physiologic distribution of the radiotracer  No PSMA avid cervical adenopathy is seen  CT images: Unremarkable  CHEST:   Mild focal radiotracer uptake in the right perihilar region, SUV max of 2 7  No obvious findings on CT  CT images: A 5 mm left lower lobe nodule was previously noted on prior CT abdomen not seen on the current exam likely inflammatory  ABDOMEN:   No PSMA avid soft tissue lesions are seen  No PSMA avid hepatic lesions  CT images: Minimal focal calcification in the liver where there was a 3 cm lesion in the left lobe near the falciform ligament  Small right lower pole renal cyst   Colonic diverticulosis  PELVIS: No PSMA avid soft tissue lesions are seen  No suspicious radiotracer uptake at the prostate bed   Left distal ureteral activity is compatible with physiologic activity  CT images: Tiny fat-containing left inguinal hernia  OSSEOUS STRUCTURES: Mild patchy radiotracer uptake at the distal sacrum/coccyx, SUV max 2 3, new from prior bone scan  No obvious findings on the limited CT  See image 248 series 1200 of the fusion images  No additional suspicious osseous foci  CT images: Multilevel degenerative spurring of the spine  Severe degenerative arthritic changes at the right hip  Moderate degenerative arthritic changes at the left hip  Impression: 1  No suspicious radiotracer uptake at the prostate bed  No PSMA avid lesions in the abdomen and pelvis  2   Mild radiotracer uptake in the right perihilar region, nonspecific, could be reactive  This can be reassessed on follow-up  3   Mild patchy radiotracer uptake at the distal sacrum/coccyx, nonspecific  No obvious findings on limited CT  Correlate for any recent trauma in this region, developing metastasis is not entirely excluded  This could be further evaluated with dedicated MRI or reassessed on follow up PSMA PET  The study was marked in EPIC for significant notification  Workstation performed: [id number]        Review of Systems   Constitutional: Negative  HENT: Positive for hearing loss and rhinorrhea  Eyes: Positive for visual disturbance  Glasses   Respiratory: Negative  Cardiovascular: Negative  Gastrointestinal: Negative  Endocrine: Negative  Musculoskeletal: Positive for gait problem (r/t right hip/femur pain)  Allergic/Immunologic: Positive for environmental allergies  Negative for food allergies  Neurological: Positive for tremors  Psychiatric/Behavioral: Negative for sleep disturbance         OBJECTIVE:   /83 (BP Location: Left arm, Patient Position: Sitting, Cuff Size: Standard)   Pulse 93   Temp 98 8 °F (37 1 °C) (Temporal)   Resp 16   Ht 5' 8" (1 727 m)   Wt 78 2 kg (172 lb 6 4 oz)   SpO2 98%   BMI 26 21 kg/m²   Pain Assessment: 0  Performance Status: ECO - Asymptomatic    Physical Exam  General Appearance:  Alert, cooperative, no distress, appears stated age  [de-identified]: normocephalic/atraumatic  Cardiovascular:  Extremities warm and well perfused  Lungs: Respirations unlabored, no cyanosis, able to speak in complete sentences without dyspnea  Abdomen: Non-distended  Extremities: No cyanosis or edema  TAZ: Deferred  Skin: No generalized rash or dermatitis  Neurologic: ANOx3, speech and cognition intact  Hard of hearing    Pathology:  See above  Portions of the record may have been created with voice recognition software  Occasional wrong word or "sound a like" substitutions may have occurred due to the inherent limitations of voice recognition software  Read the chart carefully and recognize, using context, where substitutions have occurred

## 2022-08-19 NOTE — PROGRESS NOTES
Rosalie Merritt 1950 is a 66 y o  male H/O unique 9 (4+5) prostate cancer, originally diagnosed 12/2021  PSA at time of diagnosis was 45 5  Seen in consult previously but elected treatment  with robotic prostatectomy which was completed 2/17/22  Presents today for consult post prostatectomy for focally positive surgical margin and right seminal vesicle invasion    2/17/22  Tissue Exam  A   Prostate gland and seminal vesicles:     - Acinar type prostatic adenocarcinoma, Doyle's sum 4 + 5 = 9, Prognostic grade group 5      - Tertiary pattern 3 also identified      - Multifocal extraprostatic extension by tumor present  - Tumor involves the right seminal vesicle (confirmed by NKX3 1 immunostain)  - Perineural invasion by tumor present  - Lymphatic channel invasion by tumor identified      - Focus of intraductal carcinoma  - High grade prostatic intraepithelial neoplasia (HGPIN)  - Tumor involves the following margins:       -- Right and left apex  -- Left anterior and posterior  -- Right anterior and posterior  B   Right pelvic lymph nodes:     - Nine lymph nodes identified; all negative for malignancy (0/9)  C  Left pelvic lymph nodes:     - Nine lymph nodes identified; all negative for malignancy (0/9)  4/12/22  Dr Ruth Lopez  No acute distress  Recommend referral to radiation oncology  Discussed waiting 6 months to allow for maximal healing, in the interim will return in 3 months with next PSA level    Lab Results   Component Value Date    PSA 0 6 07/26/2022    PSA 0 3 04/08/2022 7/27/22 Dr Ruth Lopez  Discussed that post operative PSA should be undetectable  Has upcoming appointment with radiation oncology  Will f/u in 4 months with PSA     8/8/22 PSMA PET/CT Scan  IMPRESSION:  1  No suspicious radiotracer uptake at the prostate bed  No PSMA avid lesions in the abdomen and pelvis    2   Mild radiotracer uptake in the right perihilar region, nonspecific, could be reactive  This can be reassessed on follow-up  3   Mild patchy radiotracer uptake at the distal sacrum/coccyx, nonspecific  No obvious findings on limited CT  Correlate for any recent trauma in this region, developing metastasis is not entirely excluded  This could be further evaluated with dedicated MRI or reassessed on follow up PSMA PET  [id number]  [place]          Oncology History   Prostate cancer ([facility] [address] )   2021 Initial Diagnosis    [facility]     2021 Biopsy    A  Prostate, right lateral base:  - Prostatic adenocarcinoma, Sandhya score 7, 4+3, Prognostic Grade Group 3, continuously involving 10% of 1 of 1 core  - Periprostatic fat invasion:Not identified  - Lymph-vascular invasion:  not identified  - Perineural invasion: identified  - Additional Pathologic Findings: none identified     B  Prostate, right lateral mid:  - Prostatic adenocarcinoma, Sandhya score 9, 4+5, Prognostic Grade Group 5, continuously involving 100% of 1 of 1 core  - Percentage pattern 4: 70%  - Percentage pattern 5: 5%  - Periprostatic fat invasion:Not identified  - Lymph-vascular invasion:  not identified  - Perineural invasion:Not identified  - Additional Pathologic Findings:  high-grade prostatic intraepithelial neoplasia     C: Prostate, right lateral apex:  - Prostatic adenocarcinoma, Sandhya score 9, 4+5, Prognostic Grade Group 5, continuously involving 100% of 1 of 1 core  - Percentage pattern 4: 60%  - Percentage pattern 5: 10%  - Periprostatic fat invasion:Not identified  - Lymph-vascular invasion:  not identified  - Perineural invasion: identified  - Additional Pathologic Findings:  high-grade prostatic intraepithelial neoplasia      D  Prostate, right base:  - Prostatic adenocarcinoma, [place] score 7, 4+3, Prognostic Grade Group 3, continuously involving 10% of 1 of 2 cores    - Periprostatic fat invasion:Not identified  - Lymph-vascular invasion:  not identified  - Perineural invasion: identified  - Additional Pathologic Findings: high-grade prostatic intraepithelial neoplasia      E  Prostate, right mid:  - Prostatic adenocarcinoma, [place] score 9, 4+5, Prognostic Grade Group 5, continuously involving 100% of 1 of 1 core  - Percentage pattern 4: 60%  - Percentage pattern 5: 10%  - Periprostatic fat invasion:Not identified  - Lymph-vascular invasion:  not identified  - Perineural invasion: identified  - Additional Pathologic Findings:  high-grade prostatic intraepithelial neoplasia       F  Prostate, right apex:  - Prostatic adenocarcinoma, [place] score 9, 4+5, Prognostic Grade Group 5, continuously involving 100% of 1 of 1 core  - Percentage pattern 4: 80%  - Percentage pattern 5: 10%  - Periprostatic fat invasion:Not identified  - Lymph-vascular invasion:  not identified  - Perineural invasion: identified  - Additional Pathologic Findings:  high-grade prostatic intraepithelial neoplasia       G  Prostate, left lateral base:  - Prostatic adenocarcinoma, Sandhya score 8, 4+4, Prognostic Grade Group 4, continuously involving 70% of 1 of 1 core  - Periprostatic fat invasion:Not identified  - Lymph-vascular invasion:  not identified  - Perineural invasion:Not identified  - Additional Pathologic Findings:  high-grade prostatic intraepithelial neoplasia       H  Prostate, left lateral mid:  - Prostatic adenocarcinoma, [place] score 8, 4+4, Prognostic Grade Group 4, continuously involving 70% of 1 of 1 core  - Periprostatic fat invasion:Not identified  - Lymph-vascular invasion:  not identified  - Perineural invasion:Not identified  - Additional Pathologic Findings:  high-grade prostatic intraepithelial neoplasia        I  Prostate, left lateral apex:  - Prostatic adenocarcinoma, Sandhya score 8, 4+4, Prognostic Grade Group 4, continuously involving 100% of 1 of 1 core    - Periprostatic fat invasion:Not identified  - Lymph-vascular invasion:  not identified  - Perineural invasion:Not identified  - Additional Pathologic Findings:  high-grade prostatic intraepithelial neoplasia       J  Prostate, left base:  - Prostatic adenocarcinoma, Sandhya score 8, 4+4, Prognostic Grade Group 4, continuously involving 60% of 1 of 1 core  - Periprostatic fat invasion:Not identified  - Lymph-vascular invasion:  not identified  - Perineural invasion:Not identified  - Additional Pathologic Findings:  high-grade prostatic intraepithelial neoplasia       K  Prostate, left mid:  - Prostatic adenocarcinoma, Sandhya score 8, 4+4, Prognostic Grade Group 4, continuously involving 30% of 1 of 1 core  - Periprostatic fat invasion:Not identified  - Lymph-vascular invasion:  not identified  - Perineural invasion:Not identified  - Additional Pathologic Findings: None identified       L  Prostate, left apex:  - Prostatic adenocarcinoma, Sandhya score 9, 4+5, Prognostic Grade Group 5, continuously involving 50% of 1 of 1 core  - Percentage pattern 4: 70%  - Percentage pattern 5: 10%  - Periprostatic fat invasion:Not identified  - Lymph-vascular invasion:  not identified  - Perineural invasion: identified  - Additional Pathologic Findings:  high-grade prostatic intraepithelial neoplasia        12/1/2021 -  Cancer Staged    Staging form: Prostate, AJCC 8th Edition  - Clinical stage from 12/1/2021: Stage IIIC (cT2a, cN0, cM0, PSA: 45 5, Grade Group: 5) - Signed by Aamir Joe MD on 1/25/2022  Prostate specific antigen (PSA) range: 20 or greater  [place] primary pattern: 4  [place] secondary pattern: 5  Histologic grading system: 5 grade system           Review of Systems:  Review of Systems   Constitutional: Negative  HENT: Positive for hearing loss and rhinorrhea  Eyes: Positive for visual disturbance  Glasses   Respiratory: Negative  Cardiovascular: Negative  Gastrointestinal: Negative  Endocrine: Negative  Musculoskeletal: Positive for gait problem (r/t right hip/femur pain)  Allergic/Immunologic: Positive for environmental allergies  Negative for food allergies  Neurological: Positive for tremors  Psychiatric/Behavioral: Negative for sleep disturbance  Clinical Trial: no    IPSS Questionnaire (AUA-7): Over the past month    1)  How often have you had a sensation of not emptying your bladder completely after you finish urinating? 0 - Not at all   2)  How often have you had to urinate again less than two hours after you finished urinating? 1 - Less than 1 time in 5   3)  How often have you found you stopped and started again several times when you urinated? 0 - Not at all   4) How difficult have you found it to postpone urination? 0 - Not at all   5) How often have you had a weak urinary stream?  0 - Not at all   6) How often have you had to push or strain to begin urination? 0 - Not at all   7) How many times did you most typically get up to urinate from the time you went to bed until the time you got up in the morning?   1 - 1 time   Total Score:  2       Pain assessment: 0        Prior Radiation : none    Teaching  Prostate cancer packet given and reviewed    MST Completed    Implantable Devices (Port, pacemaker, pain stimulator): NONE    Hip Replacement: NONE    Covid Vaccine Status Vaccinated x3    Health Maintenance   Topic Date Due    Hepatitis C Screening  Never done    BMI: Followup Plan  Never done    Annual Physical  Never done    DTaP,Tdap,and Td Vaccines (1 - Tdap) Never done    Colorectal Cancer Screening  Never done    Lung Cancer Screening  Never done    Fall Risk  Never done    Pneumococcal Vaccine: 65+ Years (1 - PCV) Never done    COVID-19 Vaccine (4 - Booster for Moderna series) 04/15/2022    Influenza Vaccine (1) 09/01/2022    Depression Screening  02/11/2023    BMI: Adult  07/27/2023    HIB Vaccine  Aged Out    Hepatitis B Vaccine  Aged Out    IPV Vaccine  Aged Out    Hepatitis A Vaccine  Aged Out    Meningococcal ACWY Vaccine  Aged Out    HPV Vaccine  Aged Out       Past Medical History:   Diagnosis Date    [facility])     prostate  dx 2021    Colon polyp     Hearing aid worn     b/l    Hypertension     Prostate cancer ([address] )     Seasonal allergies     Wears glasses        Past Surgical History:   Procedure Laterality Date    APPENDECTOMY      COLONOSCOPY      EGD      PA LAP,PROSTATECTOMY,RADICAL,W/NERVE SPARE,INCL ROBOTIC N/A 2022    Procedure: PROSTATECTOMY RADICAL LAPAROSCOPIC  W ROBOTICS, BPLND, LAPROSCOPIC LYSIS OF ADHESIONS;  Surgeon: Robert Das MD;  Location: [facility];  Service: Urology    PROSTATE BIOPSY      TONSILLECTOMY         Family History   Problem Relation Age of Onset    Cancer Father     Cancer Mother        Social History     Tobacco Use    Smoking status: Former Smoker     Packs/day: 0 50     Years: 45 00     Pack years: 22 50     Quit date:      Years since quittin 6    Smokeless tobacco: Never Used   Vaping Use    Vaping Use: Never used   Substance Use Topics    Alcohol use: Yes     Alcohol/week: 14 0 standard drinks     Types: 14 Standard drinks or equivalent per week     Comment: Beer and liquor daily-average 2-3 drinks/day       Drug use: Yes     Types: Marijuana     Comment: marijuana   smokes  1 x daily  LD 2022          Current Outpatient Medications:     acetaminophen (TYLENOL) 325 mg tablet, Take 2 tablets (650 mg total) by mouth every 6 (six) hours as needed for mild pain (Patient not taking: No sig reported), Disp: 30 tablet, Rfl: 0    b complex vitamins capsule, Take 1 capsule by mouth daily (Patient not taking: No sig reported), Disp: , Rfl:     cholecalciferol (VITAMIN D3) 400 units tablet, Take 400 Units by mouth daily (Patient not taking: No sig reported), Disp: , Rfl:     COD LIVER OIL PO, Take by mouth (Patient not taking: No sig reported), Disp: , Rfl:     diphenhydrAMINE (BENADRYL) 25 mg tablet, Take 25 mg by mouth every 6 (six) hours as needed for itching, Disp: , Rfl:     docusate sodium (COLACE) 100 mg capsule, Take 1 capsule (100 mg total) by mouth 2 (two) times a day (Patient not taking: No sig reported), Disp: 30 capsule, Rfl: 0    lisinopril (ZESTRIL) 20 mg tablet, Take 20 mg by mouth daily, Disp: , Rfl:     Turmeric 500 MG CAPS, Take by mouth (Patient not taking: No sig reported), Disp: , Rfl:     No Known Allergies     There were no vitals filed for this visit

## 2022-08-19 NOTE — Clinical Note
Sofia Daly,  Thanks for sending Mr Tracy Goodrich back over  What do you think about short term ADT? Given his 5+4 disease, multiple pathologic risk features, PSADT of ~3 months, and overall good health, I think he may benefit from short term ADT  We could also get a Decipher score to help us decide if truly on the fence  Let me know what you think  I've got him set up for CT simulation in a few days either way    [facility]

## 2022-08-19 NOTE — LETTER
2022     Andria Gutierrez [address]  [address] [address]    Patient: Kristy Quintanilla   YOB: 1950   Date of Visit: 2022       Dear Dr Coco Baker:    Thank you for referring Kristy Quintanilla to me for evaluation  Below are my notes for this consultation  If you have questions, please do not hesitate to call me  I look forward to following your patient along with you  Sincerely,        Consuelo Yost MD        CC: JENNIFER Tejada MD  2022  2:22 PM  Sign when Signing Visit  Consultation - Radiation Oncology      Patient Name: Kristy Quintanilla IC : 1950  Encounter: [id number]  Referring Provider: Kana Franklin,*    ASSESSMENT  Cancer Staging  Prostate cancer [facility])  Staging form: Prostate, AJCC 8th Edition  - Clinical stage from 2021: Stage IIIC (cT2a, cN0, cM0, PSA: 45 5, Grade Group: 5) - Signed by Consuelo Yost MD on 2022  Prostate specific antigen (PSA) range: 20 or greater  [place] primary pattern: 4  [place] secondary pattern: 5  Histologic grading system: 5 grade system    PLAN  Kristy Quintanilla is a 66 y o  male with 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 (pK1dV1L0, [place] 4+5, PSA 45 46) s/p RALP on 22 with Dr Coco Baker, pathology notable for pT3bN0 disease (Sandhya 5+4, +EPE, +SVI (right), +LVI, and multifocal positive margins, with 0/18 LN involved)  His post-RP PSA at 2 months was 0 3 and at 5 months was 0 6  PSMA PET on 22 showed no suspicious uptake at the prostate bed or abdomen/pelvis  He was referred for adjuvant radiation therapy for persistent PSA  We had a long and detailed discussion regarding the patient's diagnosis and his treatment options  I recommended postoperative radiotherapy ± short term ADT  I described a typical course of radiation therapy (70 2 Gy to the prostate bed and 45 Gy to the pelvic lymph nodes)   We discussed the simulation and treatment planning process  The possible short-term side effects include, but are not limited to, fatigue, dermatitis, dysuria, urinary frequency/urgency, rectal urgency/diarrhea/cramping, and sexual dysfunction  The possible long-term side effects include, but are not limited to, radiation cystitis, urethral stricture, radiation proctitis, bowel obstruction, fistula, and secondary malignancy  We also discussed that even with appropriate therapy, there is still a risk of disease recurrence  The patient asked a number of questions that were answered to his apparent satisfaction  I have also discussed the recently published findings of the [employer] trial/RTOG [id number], suggesting benefit to short term ADT in this setting  Traditionally, ADT is offered for PSA >0 7 based on subgroup analysis of RTOG 9601 Faiza Prince et al)  NCCN guidelines suggest no salvage ADT for older men with prolonged PSADTs (>12 months)  With his PSA of 0 6, unique 5+4 disease, multiple pathologic risk features, PSADT of ~3 months, and overall good health, I think he may benefit from short term ADT  I will review with Dr Tracey Horn if he is agreable  The patient agreed to proceed with radiation therapy, and informed consent was signed  CT simulation and treatment scheduled at [facility], per his preference  Thank you for the opportunity to participate in the care of this patient  Forrest Donnelly MD  Department of [address] [place] [address]    No orders of the defined types were placed in this encounter  No diagnosis found  CHIEF COMPLAINT  Chief Complaint   Patient presents with    Consult       History of Present Illness  Stevie De La Torre 1950 is a 66 y o  male H/O unique 9 (4+5) prostate cancer, originally diagnosed 12/2021  PSA at time of diagnosis was 45 5  Seen in consult previously but elected treatment  with robotic prostatectomy which was completed 2/17/22    Presents today for consult post prostatectomy for focally positive surgical margin and right seminal vesicle invasion     2/17/22  Tissue Exam  A   Prostate gland and seminal vesicles:     - Acinar type prostatic adenocarcinoma, [place]'s sum 4 + 5 = 9, Prognostic grade group 5      - Tertiary pattern 3 also identified      - Multifocal extraprostatic extension by tumor present      - Tumor involves the right seminal vesicle (confirmed by NKX3 1 immunostain)     - Perineural invasion by tumor present      - Lymphatic channel invasion by tumor identified      - Focus of intraductal carcinoma      - High grade prostatic intraepithelial neoplasia (HGPIN)     - Tumor involves the following margins:       -- Right and left apex        -- Left anterior and posterior        -- Right anterior and posterior    B   Right pelvic lymph nodes:     - Nine lymph nodes identified; all negative for malignancy (0/9)   C  Left pelvic lymph nodes:     - Nine lymph nodes identified; all negative for malignancy (0/9)     4/12/22  Dr Kang Cameron  No acute distress  Recommend referral to radiation oncology  Discussed waiting 6 months to allow for maximal healing, in the interim will return in 3 months with next PSA level           Lab Results   Component Value Date     PSA 0 6 07/26/2022     PSA 0 3 04/08/2022 7/27/22 Dr Kang Cameron  Discussed that post operative PSA should be undetectable  Has upcoming appointment with radiation oncology  Will f/u in 4 months with PSA      8/8/22 PSMA PET/CT Scan  IMPRESSION:  1   No suspicious radiotracer uptake at the prostate bed   No PSMA avid lesions in the abdomen and pelvis  2   Mild radiotracer uptake in the right perihilar region, nonspecific, could be reactive   This can be reassessed on follow-up    3   Mild patchy radiotracer uptake at the distal sacrum/coccyx, nonspecific   No obvious findings on limited CT   Correlate for any recent trauma in this region, developing metastasis is not entirely excluded   This could be further evaluated with dedicated MRI or reassessed on follow up PSMA PET  Today, the patient feels well overall  He has no incontinence  His IPSS score is 2  His bowel habits are regular albeit loose at times  He reports that erectile function is not a concern at this time  Oncology History   [facility] )   12/1/2021 Initial Diagnosis    Prostate [facility] )     12/1/2021 Biopsy    A  Prostate, right lateral base:  - Prostatic adenocarcinoma, Colver score 7, 4+3, Prognostic Grade Group 3, continuously involving 10% of 1 of 1 core  - Periprostatic fat invasion:Not identified  - Lymph-vascular invasion:  not identified  - Perineural invasion: identified  - Additional Pathologic Findings: none identified     B  Prostate, right lateral mid:  - Prostatic adenocarcinoma, Sandhya score 9, 4+5, Prognostic Grade Group 5, continuously involving 100% of 1 of 1 core  - Percentage pattern 4: 70%  - Percentage pattern 5: 5%  - Periprostatic fat invasion:Not identified  - Lymph-vascular invasion:  not identified  - Perineural invasion:Not identified  - Additional Pathologic Findings:  high-grade prostatic intraepithelial neoplasia     C: Prostate, right lateral apex:  - Prostatic adenocarcinoma, Sandhya score 9, 4+5, Prognostic Grade Group 5, continuously involving 100% of 1 of 1 core  - Percentage pattern 4: 60%  - Percentage pattern 5: 10%  - Periprostatic fat invasion:Not identified  - Lymph-vascular invasion:  not identified  - Perineural invasion: identified  - Additional Pathologic Findings:  high-grade prostatic intraepithelial neoplasia      D  Prostate, right base:  - Prostatic adenocarcinoma, Colver score 7, 4+3, Prognostic Grade Group 3, continuously involving 10% of 1 of 2 cores    - Periprostatic fat invasion:Not identified  - Lymph-vascular invasion:  not identified  - Perineural invasion: identified  - Additional Pathologic Findings: high-grade prostatic intraepithelial neoplasia      E  Prostate, right mid:  - Prostatic adenocarcinoma, Sandhya score 9, 4+5, Prognostic Grade Group 5, continuously involving 100% of 1 of 1 core  - Percentage pattern 4: 60%  - Percentage pattern 5: 10%  - Periprostatic fat invasion:Not identified  - Lymph-vascular invasion:  not identified  - Perineural invasion: identified  - Additional Pathologic Findings:  high-grade prostatic intraepithelial neoplasia       F  Prostate, right apex:  - Prostatic adenocarcinoma, [place] score 9, 4+5, Prognostic Grade Group 5, continuously involving 100% of 1 of 1 core  - Percentage pattern 4: 80%  - Percentage pattern 5: 10%  - Periprostatic fat invasion:Not identified  - Lymph-vascular invasion:  not identified  - Perineural invasion: identified  - Additional Pathologic Findings:  high-grade prostatic intraepithelial neoplasia       G  Prostate, left lateral base:  - Prostatic adenocarcinoma, [place] score 8, 4+4, Prognostic Grade Group 4, continuously involving 70% of 1 of 1 core  - Periprostatic fat invasion:Not identified  - Lymph-vascular invasion:  not identified  - Perineural invasion:Not identified  - Additional Pathologic Findings:  high-grade prostatic intraepithelial neoplasia       H  Prostate, left lateral mid:  - Prostatic adenocarcinoma, [place] score 8, 4+4, Prognostic Grade Group 4, continuously involving 70% of 1 of 1 core  - Periprostatic fat invasion:Not identified  - Lymph-vascular invasion:  not identified  - Perineural invasion:Not identified  - Additional Pathologic Findings:  high-grade prostatic intraepithelial neoplasia        I  Prostate, left lateral apex:  - Prostatic adenocarcinoma, Sandhya score 8, 4+4, Prognostic Grade Group 4, continuously involving 100% of 1 of 1 core    - Periprostatic fat invasion:Not identified  - Lymph-vascular invasion:  not identified  - Perineural invasion:Not identified  - Additional Pathologic Findings:  high-grade prostatic intraepithelial neoplasia       J  Prostate, left base:  - Prostatic adenocarcinoma, Sandhya score 8, 4+4, Prognostic Grade Group 4, continuously involving 60% of 1 of 1 core  - Periprostatic fat invasion:Not identified  - Lymph-vascular invasion:  not identified  - Perineural invasion:Not identified  - Additional Pathologic Findings:  high-grade prostatic intraepithelial neoplasia       K  Prostate, left mid:  - Prostatic adenocarcinoma, Sandhya score 8, 4+4, Prognostic Grade Group 4, continuously involving 30% of 1 of 1 core  - Periprostatic fat invasion:Not identified  - Lymph-vascular invasion:  not identified  - Perineural invasion:Not identified  - Additional Pathologic Findings: None identified       L  Prostate, left apex:  - Prostatic adenocarcinoma, [place] score 9, 4+5, Prognostic Grade Group 5, continuously involving 50% of 1 of 1 core    - Percentage pattern 4: 70%  - Percentage pattern 5: 10%  - Periprostatic fat invasion:Not identified  - Lymph-vascular invasion:  not identified  - Perineural invasion: identified  - Additional Pathologic Findings:  high-grade prostatic intraepithelial neoplasia        12/1/2021 -  Cancer Staged    Staging form: Prostate, AJCC 8th Edition  - Clinical stage from 12/1/2021: Stage IIIC (cT2a, cN0, cM0, PSA: 45 5, Grade Group: 5) - Signed by Suni Killian MD on 1/25/2022  Prostate specific antigen (PSA) range: 20 or greater  [place] primary pattern: 4  Sandhya secondary pattern: 5  Histologic grading system: 5 grade system         Historical Information   Past Medical History:   Diagnosis Date    Cancer [facility])     prostate  dx 12/2021    Colon polyp     Hearing aid worn     b/l    Hypertension     Prostate cancer ([address] )     Seasonal allergies     Wears glasses      Past Surgical History:   Procedure Laterality Date    APPENDECTOMY      COLONOSCOPY      EGD      MT LAP,PROSTATECTOMY,RADICAL,W/NERVE SPARE,INCL ROBOTIC N/A 2/17/2022    Procedure: PROSTATECTOMY RADICAL LAPAROSCOPIC  W ROBOTICS, BPLND, LAPROSCOPIC LYSIS OF ADHESIONS;  Surgeon: Josselin Travis MD;  Location: [facility];  Service: Urology    PROSTATE BIOPSY      TONSILLECTOMY       Family History   Problem Relation Age of Onset    Cancer Father     Cancer Mother      Social History   Social History     Substance and Sexual Activity   Alcohol Use Yes    Alcohol/week: 14 0 standard drinks    Types: 14 Standard drinks or equivalent per week    Comment: Beer and liquor daily-average 2-3 drinks/day        Social History     Substance and Sexual Activity   Drug Use Yes    Types: Marijuana    Comment: marijuana   smokes  1 x daily  LD 2022     Social History     Tobacco Use   Smoking Status Former Smoker    Packs/day: 0 50    Years: 45 00    Pack years: 22 50    Types: Pipe    Quit date:     Years since quittin 6   Smokeless Tobacco Never Used     Meds/Allergies     Current Outpatient Medications:     diphenhydrAMINE (BENADRYL) 25 mg tablet, Take 25 mg by mouth every 6 (six) hours as needed for itching, Disp: , Rfl:     fluticasone (FLONASE) 50 mcg/act nasal spray, 1 spray into each nostril, Disp: , Rfl:     lisinopril (ZESTRIL) 20 mg tablet, Take 20 mg by mouth daily, Disp: , Rfl:     montelukast (SINGULAIR) 10 mg tablet, Take 1 tablet by mouth daily, Disp: , Rfl:     acetaminophen (TYLENOL) 325 mg tablet, Take 2 tablets (650 mg total) by mouth every 6 (six) hours as needed for mild pain (Patient not taking: No sig reported), Disp: 30 tablet, Rfl: 0    b complex vitamins capsule, Take 1 capsule by mouth daily (Patient not taking: No sig reported), Disp: , Rfl:     cholecalciferol (VITAMIN D3) 400 units tablet, Take 400 Units by mouth daily (Patient not taking: No sig reported), Disp: , Rfl:     COD LIVER OIL PO, Take by mouth (Patient not taking: No sig reported), Disp: , Rfl:     docusate sodium (COLACE) 100 mg capsule, Take 1 capsule (100 mg total) by mouth 2 (two) times a day (Patient not taking: No sig reported), Disp: 30 capsule, Rfl: 0    Turmeric 500 MG CAPS, Take by mouth (Patient not taking: No sig reported), Disp: , Rfl:   No Known Allergies    Lab Results/Imaging Studies       Chemistry        Component Value Date/Time    K 4 3 05/09/2022 0946     05/09/2022 0946    CO2 28 05/09/2022 0946    BUN 25 05/09/2022 0946    CREATININE 1 25 05/09/2022 0946        Component Value Date/Time    CALCIUM 9 8 05/09/2022 0946    ALKPHOS 78 02/20/2022 0527    AST 18 02/20/2022 0527    ALT 27 02/20/2022 0527          Lab Results   Component Value Date    WBC 13 84 (H) 02/20/2022    HGB 11 1 (L) 02/20/2022    HCT 32 1 (L) 02/20/2022     (H) 02/20/2022     02/22/2022     Imaging Studies  NM PET CT skull base to mid thigh    Result Date: 8/9/2022  Narrative: PYLARIFY PSMA PET/CT SCAN INDICATION: History of prostate cancer status post prostatectomy 2/17/2022  Rising PSA  C61: Malignant neoplasm of prostate MODIFIER: PS COMPARISON: MRI abdomen 2022, CT abdomen pelvis 1/20/2022, bone scan 1/19/2022 CELL TYPE:  Prostate adenocarcinoma TECHNIQUE:   9 6 mCi Pylarify PSMA administered IV  Multiplanar attenuation corrected and non attenuation corrected PET images were acquired 60 minutes post injection  Contiguous, low dose, axial CT sections were obtained from the vertex through the femurs   Intravenous or oral contrast was not utilized  This examination, like all CT scans performed in the [facility], was performed utilizing techniques to minimize radiation dose exposure, including the use of iterative reconstruction and automated exposure control  FINDINGS: VISUALIZED BRAIN:   No acute abnormalities are seen  HEAD/NECK:   There is a physiologic distribution of the radiotracer  No PSMA avid cervical adenopathy is seen  CT images: Unremarkable  CHEST:   Mild focal radiotracer uptake in the right perihilar region, SUV max of 2 7  No obvious findings on CT  CT images:  A 5 mm left lower lobe nodule was previously noted on prior CT abdomen not seen on the current exam likely inflammatory  ABDOMEN:   No PSMA avid soft tissue lesions are seen  No PSMA avid hepatic lesions  CT images: Minimal focal calcification in the liver where there was a 3 cm lesion in the left lobe near the falciform ligament  Small right lower pole renal cyst   Colonic diverticulosis  PELVIS: No PSMA avid soft tissue lesions are seen  No suspicious radiotracer uptake at the prostate bed  Left distal ureteral activity is compatible with physiologic activity  CT images: Tiny fat-containing left inguinal hernia  OSSEOUS STRUCTURES: Mild patchy radiotracer uptake at the distal sacrum/coccyx, SUV max 2 3, new from prior bone scan  No obvious findings on the limited CT  See image 248 series 1200 of the fusion images  No additional suspicious osseous foci  CT images: Multilevel degenerative spurring of the spine  Severe degenerative arthritic changes at the right hip  Moderate degenerative arthritic changes at the left hip  Impression: 1  No suspicious radiotracer uptake at the prostate bed  No PSMA avid lesions in the abdomen and pelvis  2   Mild radiotracer uptake in the right perihilar region, nonspecific, could be reactive  This can be reassessed on follow-up  3   Mild patchy radiotracer uptake at the distal sacrum/coccyx, nonspecific  No obvious findings on limited CT  Correlate for any recent trauma in this region, developing metastasis is not entirely excluded  This could be further evaluated with dedicated MRI or reassessed on follow up PSMA PET  The study was marked in EPIC for significant notification  Workstation performed: [id number]        Review of Systems   Constitutional: Negative  HENT: Positive for hearing loss and rhinorrhea  Eyes: Positive for visual disturbance  Glasses   Respiratory: Negative  Cardiovascular: Negative  Gastrointestinal: Negative  Endocrine: Negative      Musculoskeletal: Positive for gait problem (r/t right hip/femur pain)  Allergic/Immunologic: Positive for environmental allergies  Negative for food allergies  Neurological: Positive for tremors  Psychiatric/Behavioral: Negative for sleep disturbance  OBJECTIVE:   /83 (BP Location: Left arm, Patient Position: Sitting, Cuff Size: Standard)   Pulse 93   Temp 98 8 °F (37 1 °C) (Temporal)   Resp 16   Ht 5' 8" (1 727 m)   Wt 78 2 kg (172 lb 6 4 oz)   SpO2 98%   BMI 26 21 kg/m²   Pain Assessment:  0  Performance Status: ECO - Asymptomatic    Physical Exam  General Appearance:  Alert, cooperative, no distress, appears stated age  HEENT: normocephalic/atraumatic  Cardiovascular:  Extremities warm and well perfused  Lungs: Respirations unlabored, no cyanosis, able to speak in complete sentences without dyspnea  Abdomen: Non-distended  Extremities: No cyanosis or edema  TAZ: Deferred  Skin: No generalized rash or dermatitis  Neurologic: ANOx3, speech and cognition intact  Hard of hearing    Pathology:  See above  Portions of the record may have been created with voice recognition software  Occasional wrong word or "sound a like" substitutions may have occurred due to the inherent limitations of voice recognition software  Read the chart carefully and recognize, using context, where substitutions have occurred

## 2022-08-24 ENCOUNTER — APPOINTMENT (OUTPATIENT)
Dept: RADIATION ONCOLOGY | Facility: CLINIC | Age: 72
End: 2022-08-24
Attending: INTERNAL MEDICINE
Payer: COMMERCIAL

## 2022-08-24 PROCEDURE — 77334 RADIATION TREATMENT AID(S): CPT | Performed by: INTERNAL MEDICINE

## 2022-08-24 PROCEDURE — 77399 UNLISTED PX MED RADJ PHYSICS: CPT | Performed by: INTERNAL MEDICINE

## 2022-08-29 ENCOUNTER — PATIENT OUTREACH (OUTPATIENT)
Dept: HEMATOLOGY ONCOLOGY | Facility: CLINIC | Age: 72
End: 2022-08-29

## 2022-08-29 ENCOUNTER — TELEPHONE (OUTPATIENT)
Dept: UROLOGY | Facility: CLINIC | Age: 72
End: 2022-08-29

## 2022-08-29 NOTE — TELEPHONE ENCOUNTER
NO Prior Authorization needed but valid [facility] referral required (Ref#:  [id number]) and valid from 7/27/22 until 1/23/23

## 2022-08-29 NOTE — PROGRESS NOTES
Message sent to urology clinical pool to get patient set up for ADT per Dr Couch and Dr Annelle Hatchet

## 2022-08-29 NOTE — TELEPHONE ENCOUNTER
----- Message from Gabbie Garcia sent at 8/29/2022 10:51 AM EDT -----  Please scheduled short term ADT ASAP  Thank you  ----- Message -----  From: Purcell Boas, MD  Sent: 8/26/2022   6:58 PM EDT  To: Bety Ramos, MELA, Deanna Hernández MA, #    Its one injection in my office  Easy to arrange  Thank you  FT    ----- Message -----  From: Dulce Schmidt MD  Sent: 8/26/2022   5:05 PM EDT  To: Purcell Boas, MD, Bety Ramos, RN, #    Great  RT planning already underway  Jh Wilkins, can you get him set up for short course (6 mo ) ADT? It can start at anytime, does not have to be coordinated with RT start  Have a great weekend,  Bhartesh      ----- Message -----  From: Purcell Boas, MD  Sent: 8/26/2022   2:54 PM EDT  To: Bety Ramos RN, Deanna Hernández MA, #    Short course ADT/Lupron and XRT advised  Thank you  FT    ----- Message -----  From: Dulce Schmidt MD  Sent: 8/26/2022   1:59 PM EDT  To: Purcell Boas, MD, Deanna Hernández MA, #    Hi Fabby Goldmann,     Just checking in on Mr Tj Mccray, I think my prior message got lost:    What do you think about short term ADT? Given his 5+4 disease, multiple pathologic risk features, PSADT of ~3 months, and overall good health, I think he may benefit from short term ADT  We could also get a Decipher score to help us decide if truly on the fence  Let me know what you think  His treatment planning is underway either way      Thanks, and have a great weekend,  [employer]

## 2022-08-30 NOTE — TELEPHONE ENCOUNTER
Called and spoke with patient to schedule 6 month Lupron nurse visit  Patient requested to schedule appointment on 9/8, as he will already be at the [facility] for radiation   Patient scheduled 9/8/22 @ 11:30 am

## 2022-09-07 ENCOUNTER — APPOINTMENT (OUTPATIENT)
Dept: RADIATION ONCOLOGY | Facility: CLINIC | Age: 72
End: 2022-09-07
Payer: COMMERCIAL

## 2022-09-07 PROCEDURE — 77301 RADIOTHERAPY DOSE PLAN IMRT: CPT | Performed by: RADIOLOGY

## 2022-09-07 PROCEDURE — 77300 RADIATION THERAPY DOSE PLAN: CPT | Performed by: RADIOLOGY

## 2022-09-07 PROCEDURE — 77338 DESIGN MLC DEVICE FOR IMRT: CPT | Performed by: RADIOLOGY

## 2022-09-08 ENCOUNTER — APPOINTMENT (OUTPATIENT)
Dept: RADIATION ONCOLOGY | Facility: CLINIC | Age: 72
End: 2022-09-08
Attending: INTERNAL MEDICINE
Payer: COMMERCIAL

## 2022-09-08 ENCOUNTER — PATIENT OUTREACH (OUTPATIENT)
Dept: HEMATOLOGY ONCOLOGY | Facility: CLINIC | Age: 72
End: 2022-09-08

## 2022-09-08 ENCOUNTER — PROCEDURE VISIT (OUTPATIENT)
Dept: UROLOGY | Facility: CLINIC | Age: 72
End: 2022-09-08
Payer: COMMERCIAL

## 2022-09-08 ENCOUNTER — TELEPHONE (OUTPATIENT)
Dept: UROLOGY | Facility: CLINIC | Age: 72
End: 2022-09-08

## 2022-09-08 VITALS
BODY MASS INDEX: 26.83 KG/M2 | SYSTOLIC BLOOD PRESSURE: 130 MMHG | HEIGHT: 68 IN | RESPIRATION RATE: 20 BRPM | HEART RATE: 68 BPM | DIASTOLIC BLOOD PRESSURE: 80 MMHG | WEIGHT: 177 LBS

## 2022-09-08 DIAGNOSIS — C61 PROSTATE CANCER (HCC): Primary | ICD-10-CM

## 2022-09-08 DIAGNOSIS — C61 PROSTATE CANCER (HCC): ICD-10-CM

## 2022-09-08 PROCEDURE — 96402 CHEMO HORMON ANTINEOPL SQ/IM: CPT

## 2022-09-08 PROCEDURE — 77427 RADIATION TX MANAGEMENT X5: CPT | Performed by: INTERNAL MEDICINE

## 2022-09-08 PROCEDURE — 77385 HB NTSTY MODUL RAD TX DLVR SMPL: CPT | Performed by: RADIOLOGY

## 2022-09-08 PROCEDURE — 77014 CHG CT GUIDANCE PLACEMENT RAD THERAPY FIELDS: CPT | Performed by: RADIOLOGY

## 2022-09-08 NOTE — PROGRESS NOTES
9/8/2022  Gonzalo Kulkarni is a 66 y o  male  [id number]    Diagnosis:  Chief Complaint     Prostate Cancer          Patient presents for Lupron injection managed by Dr Derek Jimenez    Plan:  Follow up with Radiation Oncology as scheduled  Has follow up with Dr Derek Jimenez 11/2022  Medication administration:  Lupron 45 mg given IM right buttocks without issue  Patient tolerated procedure well          Vitals:    09/08/22 1132   BP: 130/80   Pulse: 68   Resp: 20   Weight: 80 3 kg (177 lb)   Height: 5' 8" (1 727 m)         Malu Nickerson RN

## 2022-09-09 ENCOUNTER — APPOINTMENT (OUTPATIENT)
Dept: RADIATION ONCOLOGY | Facility: CLINIC | Age: 72
End: 2022-09-09
Attending: INTERNAL MEDICINE
Payer: COMMERCIAL

## 2022-09-09 PROCEDURE — 77014 CHG CT GUIDANCE PLACEMENT RAD THERAPY FIELDS: CPT | Performed by: RADIOLOGY

## 2022-09-09 PROCEDURE — 77385 HB NTSTY MODUL RAD TX DLVR SMPL: CPT | Performed by: RADIOLOGY

## 2022-09-12 ENCOUNTER — APPOINTMENT (OUTPATIENT)
Dept: RADIATION ONCOLOGY | Facility: CLINIC | Age: 72
End: 2022-09-12
Attending: INTERNAL MEDICINE
Payer: COMMERCIAL

## 2022-09-12 PROCEDURE — 77385 HB NTSTY MODUL RAD TX DLVR SMPL: CPT | Performed by: RADIOLOGY

## 2022-09-12 PROCEDURE — 77014 CHG CT GUIDANCE PLACEMENT RAD THERAPY FIELDS: CPT | Performed by: RADIOLOGY

## 2022-09-13 ENCOUNTER — APPOINTMENT (OUTPATIENT)
Dept: RADIATION ONCOLOGY | Facility: CLINIC | Age: 72
End: 2022-09-13
Payer: COMMERCIAL

## 2022-09-13 ENCOUNTER — APPOINTMENT (OUTPATIENT)
Dept: RADIATION ONCOLOGY | Facility: CLINIC | Age: 72
End: 2022-09-13
Attending: INTERNAL MEDICINE
Payer: COMMERCIAL

## 2022-09-13 PROCEDURE — 77014 CHG CT GUIDANCE PLACEMENT RAD THERAPY FIELDS: CPT | Performed by: RADIOLOGY

## 2022-09-13 PROCEDURE — 77385 HB NTSTY MODUL RAD TX DLVR SMPL: CPT | Performed by: RADIOLOGY

## 2022-09-14 ENCOUNTER — APPOINTMENT (OUTPATIENT)
Dept: RADIATION ONCOLOGY | Facility: CLINIC | Age: 72
End: 2022-09-14
Payer: COMMERCIAL

## 2022-09-14 ENCOUNTER — APPOINTMENT (OUTPATIENT)
Dept: RADIATION ONCOLOGY | Facility: CLINIC | Age: 72
End: 2022-09-14
Attending: INTERNAL MEDICINE
Payer: COMMERCIAL

## 2022-09-14 DIAGNOSIS — C61 PROSTATE CANCER (HCC): Primary | ICD-10-CM

## 2022-09-14 PROCEDURE — 77385 HB NTSTY MODUL RAD TX DLVR SMPL: CPT | Performed by: INTERNAL MEDICINE

## 2022-09-14 PROCEDURE — 77014 CHG CT GUIDANCE PLACEMENT RAD THERAPY FIELDS: CPT | Performed by: INTERNAL MEDICINE

## 2022-09-14 PROCEDURE — 77336 RADIATION PHYSICS CONSULT: CPT | Performed by: INTERNAL MEDICINE

## 2022-09-15 ENCOUNTER — APPOINTMENT (OUTPATIENT)
Dept: LAB | Facility: MEDICAL CENTER | Age: 72
End: 2022-09-15
Payer: COMMERCIAL

## 2022-09-15 ENCOUNTER — APPOINTMENT (OUTPATIENT)
Dept: RADIATION ONCOLOGY | Facility: CLINIC | Age: 72
End: 2022-09-15
Payer: COMMERCIAL

## 2022-09-15 LAB
BASOPHILS # BLD AUTO: 0.04 THOUSANDS/ΜL (ref 0–0.1)
BASOPHILS NFR BLD AUTO: 1 % (ref 0–1)
EOSINOPHIL # BLD AUTO: 0.05 THOUSAND/ΜL (ref 0–0.61)
EOSINOPHIL NFR BLD AUTO: 1 % (ref 0–6)
ERYTHROCYTE [DISTWIDTH] IN BLOOD BY AUTOMATED COUNT: 13.7 % (ref 11.6–15.1)
HCT VFR BLD AUTO: 39.4 % (ref 36.5–49.3)
HGB BLD-MCNC: 13 G/DL (ref 12–17)
IMM GRANULOCYTES # BLD AUTO: 0.03 THOUSAND/UL (ref 0–0.2)
IMM GRANULOCYTES NFR BLD AUTO: 1 % (ref 0–2)
LYMPHOCYTES # BLD AUTO: 0.99 THOUSANDS/ΜL (ref 0.6–4.47)
LYMPHOCYTES NFR BLD AUTO: 19 % (ref 14–44)
MCH RBC QN AUTO: 34.5 PG (ref 26.8–34.3)
MCHC RBC AUTO-ENTMCNC: 33 G/DL (ref 31.4–37.4)
MCV RBC AUTO: 105 FL (ref 82–98)
MONOCYTES # BLD AUTO: 0.57 THOUSAND/ΜL (ref 0.17–1.22)
MONOCYTES NFR BLD AUTO: 11 % (ref 4–12)
NEUTROPHILS # BLD AUTO: 3.58 THOUSANDS/ΜL (ref 1.85–7.62)
NEUTS SEG NFR BLD AUTO: 67 % (ref 43–75)
NRBC BLD AUTO-RTO: 0 /100 WBCS
PLATELET # BLD AUTO: 223 THOUSANDS/UL (ref 149–390)
PMV BLD AUTO: 10.3 FL (ref 8.9–12.7)
RBC # BLD AUTO: 3.77 MILLION/UL (ref 3.88–5.62)
WBC # BLD AUTO: 5.26 THOUSAND/UL (ref 4.31–10.16)

## 2022-09-15 PROCEDURE — 36415 COLL VENOUS BLD VENIPUNCTURE: CPT

## 2022-09-15 PROCEDURE — 85025 COMPLETE CBC W/AUTO DIFF WBC: CPT

## 2022-09-15 PROCEDURE — 77014 CHG CT GUIDANCE PLACEMENT RAD THERAPY FIELDS: CPT | Performed by: RADIOLOGY

## 2022-09-15 PROCEDURE — 77385 HB NTSTY MODUL RAD TX DLVR SMPL: CPT | Performed by: RADIOLOGY

## 2022-09-16 ENCOUNTER — APPOINTMENT (OUTPATIENT)
Dept: RADIATION ONCOLOGY | Facility: CLINIC | Age: 72
End: 2022-09-16
Payer: COMMERCIAL

## 2022-09-16 ENCOUNTER — APPOINTMENT (OUTPATIENT)
Dept: RADIATION ONCOLOGY | Facility: CLINIC | Age: 72
End: 2022-09-16
Attending: INTERNAL MEDICINE
Payer: COMMERCIAL

## 2022-09-16 PROCEDURE — 77014 CHG CT GUIDANCE PLACEMENT RAD THERAPY FIELDS: CPT | Performed by: INTERNAL MEDICINE

## 2022-09-16 PROCEDURE — 77385 HB NTSTY MODUL RAD TX DLVR SMPL: CPT | Performed by: INTERNAL MEDICINE

## 2022-09-19 ENCOUNTER — APPOINTMENT (OUTPATIENT)
Dept: RADIATION ONCOLOGY | Facility: CLINIC | Age: 72
End: 2022-09-19
Attending: INTERNAL MEDICINE
Payer: COMMERCIAL

## 2022-09-19 ENCOUNTER — APPOINTMENT (OUTPATIENT)
Dept: RADIATION ONCOLOGY | Facility: CLINIC | Age: 72
End: 2022-09-19
Payer: COMMERCIAL

## 2022-09-19 PROCEDURE — 77014 CHG CT GUIDANCE PLACEMENT RAD THERAPY FIELDS: CPT | Performed by: RADIOLOGY

## 2022-09-19 PROCEDURE — 77385 HB NTSTY MODUL RAD TX DLVR SMPL: CPT | Performed by: RADIOLOGY

## 2022-09-20 ENCOUNTER — APPOINTMENT (OUTPATIENT)
Dept: RADIATION ONCOLOGY | Facility: CLINIC | Age: 72
End: 2022-09-20
Attending: INTERNAL MEDICINE
Payer: COMMERCIAL

## 2022-09-20 ENCOUNTER — APPOINTMENT (OUTPATIENT)
Dept: RADIATION ONCOLOGY | Facility: CLINIC | Age: 72
End: 2022-09-20
Payer: COMMERCIAL

## 2022-09-20 PROCEDURE — 77014 CHG CT GUIDANCE PLACEMENT RAD THERAPY FIELDS: CPT | Performed by: RADIOLOGY

## 2022-09-20 PROCEDURE — 77385 HB NTSTY MODUL RAD TX DLVR SMPL: CPT | Performed by: RADIOLOGY

## 2022-09-21 ENCOUNTER — APPOINTMENT (OUTPATIENT)
Dept: RADIATION ONCOLOGY | Facility: CLINIC | Age: 72
End: 2022-09-21
Attending: INTERNAL MEDICINE
Payer: COMMERCIAL

## 2022-09-21 ENCOUNTER — APPOINTMENT (OUTPATIENT)
Dept: RADIATION ONCOLOGY | Facility: CLINIC | Age: 72
End: 2022-09-21
Payer: COMMERCIAL

## 2022-09-21 PROCEDURE — 77014 CHG CT GUIDANCE PLACEMENT RAD THERAPY FIELDS: CPT | Performed by: RADIOLOGY

## 2022-09-21 PROCEDURE — 77385 HB NTSTY MODUL RAD TX DLVR SMPL: CPT | Performed by: RADIOLOGY

## 2022-09-21 PROCEDURE — 77336 RADIATION PHYSICS CONSULT: CPT | Performed by: RADIOLOGY

## 2022-09-22 ENCOUNTER — APPOINTMENT (OUTPATIENT)
Dept: RADIATION ONCOLOGY | Facility: CLINIC | Age: 72
End: 2022-09-22
Attending: INTERNAL MEDICINE
Payer: COMMERCIAL

## 2022-09-22 ENCOUNTER — APPOINTMENT (OUTPATIENT)
Dept: RADIATION ONCOLOGY | Facility: CLINIC | Age: 72
End: 2022-09-22
Payer: COMMERCIAL

## 2022-09-22 PROCEDURE — 77427 RADIATION TX MANAGEMENT X5: CPT | Performed by: INTERNAL MEDICINE

## 2022-09-22 PROCEDURE — 77385 HB NTSTY MODUL RAD TX DLVR SMPL: CPT | Performed by: RADIOLOGY

## 2022-09-22 PROCEDURE — 77014 CHG CT GUIDANCE PLACEMENT RAD THERAPY FIELDS: CPT | Performed by: RADIOLOGY

## 2022-09-23 ENCOUNTER — APPOINTMENT (OUTPATIENT)
Dept: RADIATION ONCOLOGY | Facility: CLINIC | Age: 72
End: 2022-09-23
Payer: COMMERCIAL

## 2022-09-23 ENCOUNTER — APPOINTMENT (OUTPATIENT)
Dept: RADIATION ONCOLOGY | Facility: CLINIC | Age: 72
End: 2022-09-23
Attending: INTERNAL MEDICINE
Payer: COMMERCIAL

## 2022-09-23 PROCEDURE — 77385 HB NTSTY MODUL RAD TX DLVR SMPL: CPT | Performed by: RADIOLOGY

## 2022-09-23 PROCEDURE — 77014 CHG CT GUIDANCE PLACEMENT RAD THERAPY FIELDS: CPT | Performed by: RADIOLOGY

## 2022-09-26 ENCOUNTER — APPOINTMENT (OUTPATIENT)
Dept: RADIATION ONCOLOGY | Facility: CLINIC | Age: 72
End: 2022-09-26
Attending: INTERNAL MEDICINE
Payer: COMMERCIAL

## 2022-09-26 ENCOUNTER — APPOINTMENT (OUTPATIENT)
Dept: RADIATION ONCOLOGY | Facility: CLINIC | Age: 72
End: 2022-09-26
Payer: COMMERCIAL

## 2022-09-26 PROCEDURE — 77014 CHG CT GUIDANCE PLACEMENT RAD THERAPY FIELDS: CPT | Performed by: RADIOLOGY

## 2022-09-26 PROCEDURE — 77385 HB NTSTY MODUL RAD TX DLVR SMPL: CPT | Performed by: RADIOLOGY

## 2022-09-27 ENCOUNTER — APPOINTMENT (OUTPATIENT)
Dept: RADIATION ONCOLOGY | Facility: CLINIC | Age: 72
End: 2022-09-27
Payer: COMMERCIAL

## 2022-09-27 ENCOUNTER — APPOINTMENT (OUTPATIENT)
Dept: RADIATION ONCOLOGY | Facility: CLINIC | Age: 72
End: 2022-09-27
Attending: INTERNAL MEDICINE
Payer: COMMERCIAL

## 2022-09-27 PROCEDURE — 77385 HB NTSTY MODUL RAD TX DLVR SMPL: CPT | Performed by: RADIOLOGY

## 2022-09-27 PROCEDURE — 77014 CHG CT GUIDANCE PLACEMENT RAD THERAPY FIELDS: CPT | Performed by: RADIOLOGY

## 2022-09-28 ENCOUNTER — APPOINTMENT (OUTPATIENT)
Dept: RADIATION ONCOLOGY | Facility: CLINIC | Age: 72
End: 2022-09-28
Attending: INTERNAL MEDICINE
Payer: COMMERCIAL

## 2022-09-28 ENCOUNTER — APPOINTMENT (OUTPATIENT)
Dept: RADIATION ONCOLOGY | Facility: CLINIC | Age: 72
End: 2022-09-28
Payer: COMMERCIAL

## 2022-09-28 PROCEDURE — 77336 RADIATION PHYSICS CONSULT: CPT | Performed by: INTERNAL MEDICINE

## 2022-09-28 PROCEDURE — 77014 CHG CT GUIDANCE PLACEMENT RAD THERAPY FIELDS: CPT | Performed by: RADIOLOGY

## 2022-09-28 PROCEDURE — 77385 HB NTSTY MODUL RAD TX DLVR SMPL: CPT | Performed by: RADIOLOGY

## 2022-09-29 ENCOUNTER — APPOINTMENT (OUTPATIENT)
Dept: RADIATION ONCOLOGY | Facility: CLINIC | Age: 72
End: 2022-09-29
Attending: INTERNAL MEDICINE
Payer: COMMERCIAL

## 2022-09-29 ENCOUNTER — APPOINTMENT (OUTPATIENT)
Dept: RADIATION ONCOLOGY | Facility: CLINIC | Age: 72
End: 2022-09-29
Payer: COMMERCIAL

## 2022-09-29 PROCEDURE — 77014 CHG CT GUIDANCE PLACEMENT RAD THERAPY FIELDS: CPT | Performed by: RADIOLOGY

## 2022-09-29 PROCEDURE — 77427 RADIATION TX MANAGEMENT X5: CPT | Performed by: INTERNAL MEDICINE

## 2022-09-29 PROCEDURE — 77385 HB NTSTY MODUL RAD TX DLVR SMPL: CPT | Performed by: RADIOLOGY

## 2022-09-30 ENCOUNTER — APPOINTMENT (OUTPATIENT)
Dept: RADIATION ONCOLOGY | Facility: CLINIC | Age: 72
End: 2022-09-30
Payer: COMMERCIAL

## 2022-09-30 ENCOUNTER — APPOINTMENT (OUTPATIENT)
Dept: RADIATION ONCOLOGY | Facility: CLINIC | Age: 72
End: 2022-09-30
Attending: INTERNAL MEDICINE
Payer: COMMERCIAL

## 2022-09-30 PROCEDURE — 77385 HB NTSTY MODUL RAD TX DLVR SMPL: CPT | Performed by: INTERNAL MEDICINE

## 2022-09-30 PROCEDURE — 77014 CHG CT GUIDANCE PLACEMENT RAD THERAPY FIELDS: CPT | Performed by: INTERNAL MEDICINE

## 2022-10-03 ENCOUNTER — APPOINTMENT (OUTPATIENT)
Dept: RADIATION ONCOLOGY | Facility: CLINIC | Age: 72
End: 2022-10-03

## 2022-10-03 ENCOUNTER — APPOINTMENT (OUTPATIENT)
Dept: RADIATION ONCOLOGY | Facility: CLINIC | Age: 72
End: 2022-10-03
Attending: INTERNAL MEDICINE

## 2022-10-04 ENCOUNTER — APPOINTMENT (OUTPATIENT)
Dept: RADIATION ONCOLOGY | Facility: CLINIC | Age: 72
End: 2022-10-04

## 2022-10-04 ENCOUNTER — APPOINTMENT (OUTPATIENT)
Dept: RADIATION ONCOLOGY | Facility: CLINIC | Age: 72
End: 2022-10-04
Attending: INTERNAL MEDICINE

## 2022-10-05 ENCOUNTER — APPOINTMENT (OUTPATIENT)
Dept: RADIATION ONCOLOGY | Facility: CLINIC | Age: 72
End: 2022-10-05

## 2022-10-05 ENCOUNTER — APPOINTMENT (OUTPATIENT)
Dept: RADIATION ONCOLOGY | Facility: CLINIC | Age: 72
End: 2022-10-05
Attending: RADIOLOGY

## 2022-10-06 ENCOUNTER — APPOINTMENT (OUTPATIENT)
Dept: RADIATION ONCOLOGY | Facility: CLINIC | Age: 72
End: 2022-10-06

## 2022-10-06 ENCOUNTER — APPOINTMENT (OUTPATIENT)
Dept: RADIATION ONCOLOGY | Facility: CLINIC | Age: 72
End: 2022-10-06
Attending: INTERNAL MEDICINE

## 2022-10-07 ENCOUNTER — APPOINTMENT (OUTPATIENT)
Dept: RADIATION ONCOLOGY | Facility: CLINIC | Age: 72
End: 2022-10-07

## 2022-10-07 ENCOUNTER — APPOINTMENT (OUTPATIENT)
Dept: RADIATION ONCOLOGY | Facility: CLINIC | Age: 72
End: 2022-10-07
Attending: INTERNAL MEDICINE

## 2022-10-10 ENCOUNTER — APPOINTMENT (OUTPATIENT)
Dept: LAB | Facility: CLINIC | Age: 72
End: 2022-10-10
Payer: COMMERCIAL

## 2022-10-10 ENCOUNTER — APPOINTMENT (OUTPATIENT)
Dept: RADIATION ONCOLOGY | Facility: CLINIC | Age: 72
End: 2022-10-10

## 2022-10-10 ENCOUNTER — APPOINTMENT (OUTPATIENT)
Dept: RADIATION ONCOLOGY | Facility: CLINIC | Age: 72
End: 2022-10-10
Attending: INTERNAL MEDICINE

## 2022-10-11 ENCOUNTER — APPOINTMENT (OUTPATIENT)
Dept: RADIATION ONCOLOGY | Facility: CLINIC | Age: 72
End: 2022-10-11
Attending: INTERNAL MEDICINE

## 2022-10-11 ENCOUNTER — APPOINTMENT (OUTPATIENT)
Dept: RADIATION ONCOLOGY | Facility: CLINIC | Age: 72
End: 2022-10-11

## 2022-10-12 ENCOUNTER — APPOINTMENT (OUTPATIENT)
Dept: RADIATION ONCOLOGY | Facility: CLINIC | Age: 72
End: 2022-10-12
Attending: INTERNAL MEDICINE

## 2022-10-12 ENCOUNTER — APPOINTMENT (OUTPATIENT)
Dept: RADIATION ONCOLOGY | Facility: CLINIC | Age: 72
End: 2022-10-12

## 2022-10-13 ENCOUNTER — APPOINTMENT (OUTPATIENT)
Dept: RADIATION ONCOLOGY | Facility: CLINIC | Age: 72
End: 2022-10-13
Attending: INTERNAL MEDICINE

## 2022-10-13 ENCOUNTER — APPOINTMENT (OUTPATIENT)
Dept: RADIATION ONCOLOGY | Facility: CLINIC | Age: 72
End: 2022-10-13

## 2022-10-14 ENCOUNTER — APPOINTMENT (OUTPATIENT)
Dept: RADIATION ONCOLOGY | Facility: CLINIC | Age: 72
End: 2022-10-14

## 2022-10-14 ENCOUNTER — APPOINTMENT (OUTPATIENT)
Dept: RADIATION ONCOLOGY | Facility: CLINIC | Age: 72
End: 2022-10-14
Attending: INTERNAL MEDICINE

## 2022-10-17 ENCOUNTER — APPOINTMENT (OUTPATIENT)
Dept: RADIATION ONCOLOGY | Facility: CLINIC | Age: 72
End: 2022-10-17

## 2022-10-17 ENCOUNTER — APPOINTMENT (OUTPATIENT)
Dept: RADIATION ONCOLOGY | Facility: CLINIC | Age: 72
End: 2022-10-17
Attending: INTERNAL MEDICINE

## 2022-10-18 ENCOUNTER — APPOINTMENT (OUTPATIENT)
Dept: RADIATION ONCOLOGY | Facility: CLINIC | Age: 72
End: 2022-10-18

## 2022-10-18 ENCOUNTER — APPOINTMENT (OUTPATIENT)
Dept: RADIATION ONCOLOGY | Facility: CLINIC | Age: 72
End: 2022-10-18
Attending: INTERNAL MEDICINE

## 2022-10-19 ENCOUNTER — APPOINTMENT (OUTPATIENT)
Dept: RADIATION ONCOLOGY | Facility: CLINIC | Age: 72
End: 2022-10-19

## 2022-10-19 ENCOUNTER — APPOINTMENT (OUTPATIENT)
Dept: RADIATION ONCOLOGY | Facility: CLINIC | Age: 72
End: 2022-10-19
Attending: INTERNAL MEDICINE

## 2022-10-20 ENCOUNTER — APPOINTMENT (OUTPATIENT)
Dept: RADIATION ONCOLOGY | Facility: CLINIC | Age: 72
End: 2022-10-20

## 2022-10-20 ENCOUNTER — APPOINTMENT (OUTPATIENT)
Dept: RADIATION ONCOLOGY | Facility: CLINIC | Age: 72
End: 2022-10-20
Attending: INTERNAL MEDICINE

## 2022-10-21 ENCOUNTER — APPOINTMENT (OUTPATIENT)
Dept: RADIATION ONCOLOGY | Facility: CLINIC | Age: 72
End: 2022-10-21

## 2022-10-21 ENCOUNTER — APPOINTMENT (OUTPATIENT)
Dept: RADIATION ONCOLOGY | Facility: CLINIC | Age: 72
End: 2022-10-21
Attending: INTERNAL MEDICINE

## 2022-10-24 ENCOUNTER — APPOINTMENT (OUTPATIENT)
Dept: RADIATION ONCOLOGY | Facility: CLINIC | Age: 72
End: 2022-10-24

## 2022-10-24 ENCOUNTER — APPOINTMENT (OUTPATIENT)
Dept: RADIATION ONCOLOGY | Facility: CLINIC | Age: 72
End: 2022-10-24
Attending: INTERNAL MEDICINE

## 2022-10-25 ENCOUNTER — APPOINTMENT (OUTPATIENT)
Dept: RADIATION ONCOLOGY | Facility: CLINIC | Age: 72
End: 2022-10-25
Attending: INTERNAL MEDICINE

## 2022-10-25 ENCOUNTER — APPOINTMENT (OUTPATIENT)
Dept: RADIATION ONCOLOGY | Facility: CLINIC | Age: 72
End: 2022-10-25

## 2022-10-26 ENCOUNTER — APPOINTMENT (OUTPATIENT)
Dept: RADIATION ONCOLOGY | Facility: CLINIC | Age: 72
End: 2022-10-26
Attending: INTERNAL MEDICINE

## 2022-10-26 ENCOUNTER — APPOINTMENT (OUTPATIENT)
Dept: RADIATION ONCOLOGY | Facility: CLINIC | Age: 72
End: 2022-10-26

## 2022-10-27 ENCOUNTER — APPOINTMENT (OUTPATIENT)
Dept: RADIATION ONCOLOGY | Facility: CLINIC | Age: 72
End: 2022-10-27
Attending: INTERNAL MEDICINE

## 2022-10-27 ENCOUNTER — APPOINTMENT (OUTPATIENT)
Dept: RADIATION ONCOLOGY | Facility: CLINIC | Age: 72
End: 2022-10-27

## 2022-10-28 ENCOUNTER — APPOINTMENT (OUTPATIENT)
Dept: RADIATION ONCOLOGY | Facility: CLINIC | Age: 72
End: 2022-10-28
Attending: INTERNAL MEDICINE

## 2022-10-28 ENCOUNTER — APPOINTMENT (OUTPATIENT)
Dept: RADIATION ONCOLOGY | Facility: CLINIC | Age: 72
End: 2022-10-28

## 2022-10-31 ENCOUNTER — APPOINTMENT (OUTPATIENT)
Dept: RADIATION ONCOLOGY | Facility: CLINIC | Age: 72
End: 2022-10-31

## 2022-10-31 ENCOUNTER — APPOINTMENT (OUTPATIENT)
Dept: RADIATION ONCOLOGY | Facility: CLINIC | Age: 72
End: 2022-10-31
Attending: INTERNAL MEDICINE

## 2022-11-01 ENCOUNTER — APPOINTMENT (OUTPATIENT)
Dept: RADIATION ONCOLOGY | Facility: CLINIC | Age: 72
End: 2022-11-01
Attending: INTERNAL MEDICINE

## 2022-11-01 ENCOUNTER — APPOINTMENT (OUTPATIENT)
Dept: RADIATION ONCOLOGY | Facility: CLINIC | Age: 72
End: 2022-11-01

## 2022-11-02 ENCOUNTER — APPOINTMENT (OUTPATIENT)
Dept: RADIATION ONCOLOGY | Facility: CLINIC | Age: 72
End: 2022-11-02
Attending: INTERNAL MEDICINE

## 2022-11-02 ENCOUNTER — APPOINTMENT (OUTPATIENT)
Dept: RADIATION ONCOLOGY | Facility: CLINIC | Age: 72
End: 2022-11-02

## 2022-11-03 ENCOUNTER — APPOINTMENT (OUTPATIENT)
Dept: RADIATION ONCOLOGY | Facility: CLINIC | Age: 72
End: 2022-11-03

## 2022-11-03 ENCOUNTER — APPOINTMENT (OUTPATIENT)
Dept: RADIATION ONCOLOGY | Facility: CLINIC | Age: 72
End: 2022-11-03
Attending: INTERNAL MEDICINE

## 2022-11-04 ENCOUNTER — APPOINTMENT (OUTPATIENT)
Dept: RADIATION ONCOLOGY | Facility: CLINIC | Age: 72
End: 2022-11-04

## 2022-11-04 ENCOUNTER — APPOINTMENT (OUTPATIENT)
Dept: RADIATION ONCOLOGY | Facility: CLINIC | Age: 72
End: 2022-11-04
Attending: INTERNAL MEDICINE

## 2022-11-07 ENCOUNTER — APPOINTMENT (OUTPATIENT)
Dept: RADIATION ONCOLOGY | Facility: CLINIC | Age: 72
End: 2022-11-07

## 2022-11-08 ENCOUNTER — APPOINTMENT (OUTPATIENT)
Dept: RADIATION ONCOLOGY | Facility: CLINIC | Age: 72
End: 2022-11-08

## 2022-11-09 ENCOUNTER — APPOINTMENT (OUTPATIENT)
Dept: RADIATION ONCOLOGY | Facility: CLINIC | Age: 72
End: 2022-11-09

## 2022-11-25 ENCOUNTER — TELEPHONE (OUTPATIENT)
Dept: UROLOGY | Facility: MEDICAL CENTER | Age: 72
End: 2022-11-25

## 2022-11-25 ENCOUNTER — APPOINTMENT (OUTPATIENT)
Dept: LAB | Facility: CLINIC | Age: 72
End: 2022-11-25

## 2022-11-25 DIAGNOSIS — C61 PROSTATE CANCER (HCC): ICD-10-CM

## 2022-11-25 LAB — PSA SERPL-MCNC: 0.1 NG/ML (ref 0–4)

## 2022-11-29 ENCOUNTER — OFFICE VISIT (OUTPATIENT)
Dept: UROLOGY | Facility: CLINIC | Age: 72
End: 2022-11-29

## 2022-11-29 VITALS
DIASTOLIC BLOOD PRESSURE: 86 MMHG | HEART RATE: 76 BPM | BODY MASS INDEX: 27.71 KG/M2 | HEIGHT: 68 IN | SYSTOLIC BLOOD PRESSURE: 134 MMHG | WEIGHT: 182.8 LBS | OXYGEN SATURATION: 100 %

## 2022-11-29 DIAGNOSIS — C61 PROSTATE CANCER (HCC): Primary | ICD-10-CM

## 2022-11-29 NOTE — LETTER
November 29, 2022     Jann Adams MD  [facility] [address]    Patient: Sherley Abarca   YOB: 1950   Date of Visit: 11/29/2022       Dear Dr Carmelina White: Thank you for referring Sherley Abarca to me for evaluation  Below are my notes for this consultation  If you have questions, please do not hesitate to call me  I look forward to following your patient along with you  Sincerely,        Katherine Alexander MD        CC: MD Katherine Bell MD  11/29/2022  2:31 PM  Sign when Signing Visit  11/29/2022    Sherley Abarca  1950  [id number]        Assessment  [place] 4+5=9/10 prostate cancer, status post robot assisted laparoscopic prostatectomy, history of right seminal vesicle invasion, focally positive surgical margin, status post 6 month Lupron Depot x1, status post external beam radiation therapy      Discussion  We discussed that his PSA is 0 1 after surgery, radiation, and a single dose of androgen deprivation therapy administered in late September 2022  He understands that the Lupron will be in his system through March of 2023 and that his true post surgical, post radiation PSA victorina will be known in the early summer of 2023  In the interim follow-up will be in 3 months with his next PSA level  He will continue to follow with Radiation Oncology as well  History of Present Illness  66 y o  male with a history of [place] 9 prostate cancer  His PSA level at that time was greater than 40  He underwent a robot assisted laparoscopic prostatectomy performed in February 2022  His postoperative course was complicated with an anastomotic leak which ultimately healed  Final pathology revealed T3 B disease with invasion into the right seminal vesicle with a focally positive surgical margin  His postoperative PSA victorina was 0 3 and then quickly went to 0 6    He received a single dose of Lupron in September of 2022 prior to starting post prostatectomy radiation  He returns in follow-up today  His most recent PSA level is noted to be 0 1 from 2022  AUA Symptom Score      Review of Systems  Review of Systems   Constitutional: Negative  HENT: Negative  Eyes: Negative  Respiratory: Negative  Cardiovascular: Negative  Gastrointestinal: Negative  Endocrine: Negative  Genitourinary:        Per HPI   Musculoskeletal: Negative  Skin: Negative  Allergic/Immunologic: Negative  Neurological: Negative  Hematological: Negative  Psychiatric/Behavioral: Negative            Past Medical History  Past Medical History:   Diagnosis Date   • Cancer [facility])     prostate  dx 2021   • Colon polyp    • Hearing aid worn     b/l   • Hypertension    • Prostate cancer ([facility] [address] )    • Seasonal allergies    • Wears glasses        Past Social History  Past Surgical History:   Procedure Laterality Date   • APPENDECTOMY     • COLONOSCOPY     • EGD     • [place] LAP,PROSTATECTOMY,RADICAL,W/NERVE SPARE,INCL ROBOTIC N/A 2022    Procedure: PROSTATECTOMY RADICAL LAPAROSCOPIC  W ROBOTICS, BPLND, LAPROSCOPIC LYSIS OF ADHESIONS;  Surgeon: Devi Faustin MD;  Location: [facility];  Service: Urology   • PROSTATE BIOPSY     • TONSILLECTOMY         Past Family History  Family History   Problem Relation Age of Onset   • Cancer Father    • Cancer Mother        Past Social history  Social History     Socioeconomic History   • Marital status: /Civil Union     Spouse name: Not on file   • Number of children: Not on file   • Years of education: Not on file   • Highest education level: Not on file   Occupational History   • Not on file   Tobacco Use   • Smoking status: Former     Packs/day: 0 50     Years: 45 00     Pack years: 22 50     Types: Pipe, Cigarettes     Quit date:      Years since quittin 9   • Smokeless tobacco: Never   Vaping Use   • Vaping Use: Never used   Substance and Sexual Activity   • Alcohol use: Yes     Alcohol/week: 14 0 standard drinks     Types: 14 Standard drinks or equivalent per week     Comment: Beer and liquor daily-average 2-3 drinks/day  • Drug use: Yes     Types: Marijuana     Comment: marijuana   smokes  1 x daily  LD 1/2022   • Sexual activity: Not Currently   Other Topics Concern   • Not on file   Social History Narrative   • Not on file     Social Determinants of Health     Financial Resource Strain: Not on file   Food Insecurity: Not on file   Transportation Needs: Not on file   Physical Activity: Not on file   Stress: Not on file   Social Connections: Not on file   Intimate Partner Violence: Not on file   Housing Stability: Not on file       Current Medications  Current Outpatient Medications   Medication Sig Dispense Refill   • fluticasone (FLONASE) 50 mcg/act nasal spray 1 spray into each nostril     • lisinopril (ZESTRIL) 20 mg tablet Take 20 mg by mouth daily     • montelukast (SINGULAIR) 10 mg tablet Take 1 tablet by mouth daily     • acetaminophen (TYLENOL) 325 mg tablet Take 2 tablets (650 mg total) by mouth every 6 (six) hours as needed for mild pain (Patient not taking: No sig reported) 30 tablet 0   • b complex vitamins capsule Take 1 capsule by mouth daily (Patient not taking: No sig reported)     • cholecalciferol (VITAMIN D3) 400 units tablet Take 400 Units by mouth daily (Patient not taking: No sig reported)     • COD LIVER OIL PO Take by mouth (Patient not taking: No sig reported)     • diphenhydrAMINE (BENADRYL) 25 mg tablet Take 25 mg by mouth every 6 (six) hours as needed for itching (Patient not taking: Reported on 9/8/2022)     • docusate sodium (COLACE) 100 mg capsule Take 1 capsule (100 mg total) by mouth 2 (two) times a day (Patient not taking: No sig reported) 30 capsule 0   • Turmeric 500 MG CAPS Take by mouth (Patient not taking: No sig reported)       No current facility-administered medications for this visit         Allergies  No Known Allergies    Past Medical History, Social History, Family History, medications and allergies were reviewed  Vitals  Vitals:    11/29/22 1407   BP: 134/86   BP Location: Left arm   Patient Position: Sitting   Cuff Size: Adult   Pulse: 76   SpO2: 100%   Weight: 82 9 kg (182 lb 12 8 oz)   Height: 5' 8" (1 727 m)       Physical Exam  Physical Exam  On examination he is in no acute distress    Gait normal   Affect normal    Results  Lab Results   Component Value Date    PSA 0 1 11/25/2022    PSA 0 6 07/26/2022    PSA 0 3 04/08/2022     Lab Results   Component Value Date    CALCIUM 9 8 05/09/2022    K 4 3 05/09/2022    CO2 28 05/09/2022     05/09/2022    BUN 25 05/09/2022    CREATININE 1 25 05/09/2022     Lab Results   Component Value Date    WBC 7 40 10/10/2022    HGB 13 9 10/10/2022    HCT 42 6 10/10/2022     (H) 10/10/2022     10/10/2022         Office Urine Dip  No results found for this or any previous visit (from the past 1 hour(s)) ]

## 2022-11-29 NOTE — PROGRESS NOTES
11/29/2022    Cheryl [facility]  1950  [id number]        Assessment  [place] 4+5=9/10 prostate cancer, status post robot assisted laparoscopic prostatectomy, history of right seminal vesicle invasion, focally positive surgical margin, status post 6 month Lupron Depot x1, status post external beam radiation therapy      Discussion  We discussed that his PSA is 0 1 after surgery, radiation, and a single dose of androgen deprivation therapy administered in late September 2022  He understands that the Lupron will be in his system through March of 2023 and that his true post surgical, post radiation PSA victorina will be known in the early summer of 2023  In the interim follow-up will be in 3 months with his next PSA level  He will continue to follow with Radiation Oncology as well  History of Present Illness  66 y o  male with a history of [place] 9 prostate cancer  His PSA level at that time was greater than 40  He underwent a robot assisted laparoscopic prostatectomy performed in February 2022  His postoperative course was complicated with an anastomotic leak which ultimately healed  Final pathology revealed T3 B disease with invasion into the right seminal vesicle with a focally positive surgical margin  His postoperative PSA victorina was 0 3 and then quickly went to 0 6  He received a single dose of Lupron in September of 2022 prior to starting post prostatectomy radiation  He returns in follow-up today  His most recent PSA level is noted to be 0 1 from November 2022  AUA Symptom Score      Review of Systems  Review of Systems   Constitutional: Negative  HENT: Negative  Eyes: Negative  Respiratory: Negative  Cardiovascular: Negative  Gastrointestinal: Negative  Endocrine: Negative  Genitourinary:        Per HPI   Musculoskeletal: Negative  Skin: Negative  Allergic/Immunologic: Negative  Neurological: Negative  Hematological: Negative      Psychiatric/Behavioral: Negative  Past Medical History  Past Medical History:   Diagnosis Date   • Cancer [facility])     prostate  dx 2021   • Colon polyp    • Hearing aid worn     b/l   • Hypertension    • Prostate cancer ([address] )    • Seasonal allergies    • Wears glasses        Past Social History  Past Surgical History:   Procedure Laterality Date   • APPENDECTOMY     • COLONOSCOPY     • EGD     • [place] LAP,PROSTATECTOMY,RADICAL,W/NERVE SPARE,INCL ROBOTIC N/A 2022    Procedure: PROSTATECTOMY RADICAL LAPAROSCOPIC  W ROBOTICS, BPLND, LAPROSCOPIC LYSIS OF ADHESIONS;  Surgeon: Marbin Oliver MD;  Location: [facility] OR;  Service: Urology   • PROSTATE BIOPSY     • TONSILLECTOMY         Past Family History  Family History   Problem Relation Age of Onset   • Cancer Father    • Cancer Mother        Past Social history  Social History     Socioeconomic History   • Marital status: /Civil Union     Spouse name: Not on file   • Number of children: Not on file   • Years of education: Not on file   • Highest education level: Not on file   Occupational History   • Not on file   Tobacco Use   • Smoking status: Former     Packs/day: 0 50     Years: 45 00     Pack years: 22 50     Types: Pipe, Cigarettes     Quit date:      Years since quittin 9   • Smokeless tobacco: Never   Vaping Use   • Vaping Use: Never used   Substance and Sexual Activity   • Alcohol use: Yes     Alcohol/week: 14 0 standard drinks     Types: 14 Standard drinks or equivalent per week     Comment: Beer and liquor daily-average 2-3 drinks/day      • Drug use: Yes     Types: Marijuana     Comment: marijuana   smokes  1 x daily  LD 2022   • Sexual activity: Not Currently   Other Topics Concern   • Not on file   Social History Narrative   • Not on file     Social Determinants of Health     Financial Resource Strain: Not on file   Food Insecurity: Not on file   Transportation Needs: Not on file   Physical Activity: Not on file   Stress: Not on file   Social Connections: Not on file   Intimate Partner Violence: Not on file   Housing Stability: Not on file       Current Medications  Current Outpatient Medications   Medication Sig Dispense Refill   • fluticasone (FLONASE) 50 mcg/act nasal spray 1 spray into each nostril     • lisinopril (ZESTRIL) 20 mg tablet Take 20 mg by mouth daily     • montelukast (SINGULAIR) 10 mg tablet Take 1 tablet by mouth daily     • acetaminophen (TYLENOL) 325 mg tablet Take 2 tablets (650 mg total) by mouth every 6 (six) hours as needed for mild pain (Patient not taking: No sig reported) 30 tablet 0   • b complex vitamins capsule Take 1 capsule by mouth daily (Patient not taking: No sig reported)     • cholecalciferol (VITAMIN D3) 400 units tablet Take 400 Units by mouth daily (Patient not taking: No sig reported)     • COD LIVER OIL PO Take by mouth (Patient not taking: No sig reported)     • diphenhydrAMINE (BENADRYL) 25 mg tablet Take 25 mg by mouth every 6 (six) hours as needed for itching (Patient not taking: Reported on 9/8/2022)     • docusate sodium (COLACE) 100 mg capsule Take 1 capsule (100 mg total) by mouth 2 (two) times a day (Patient not taking: No sig reported) 30 capsule 0   • Turmeric 500 MG CAPS Take by mouth (Patient not taking: No sig reported)       No current facility-administered medications for this visit  Allergies  No Known Allergies    Past Medical History, Social History, Family History, medications and allergies were reviewed  Vitals  Vitals:    11/29/22 1407   BP: 134/86   BP Location: Left arm   Patient Position: Sitting   Cuff Size: Adult   Pulse: 76   SpO2: 100%   Weight: 82 9 kg (182 lb 12 8 oz)   Height: 5' 8" (1 727 m)       Physical Exam  Physical Exam  On examination he is in no acute distress    Gait normal   Affect normal    Results  Lab Results   Component Value Date    PSA 0 1 11/25/2022    PSA 0 6 07/26/2022    PSA 0 3 04/08/2022     Lab Results   Component Value Date    CALCIUM 9 8 05/09/2022    K 4 3 05/09/2022    CO2 28 05/09/2022     05/09/2022    BUN 25 05/09/2022    CREATININE 1 25 05/09/2022     Lab Results   Component Value Date    WBC 7 40 10/10/2022    HGB 13 9 10/10/2022    HCT 42 6 10/10/2022     (H) 10/10/2022     10/10/2022         Office Urine Dip  No results found for this or any previous visit (from the past 1 hour(s)) ]

## 2022-12-14 ENCOUNTER — APPOINTMENT (OUTPATIENT)
Dept: RADIATION ONCOLOGY | Facility: CLINIC | Age: 72
End: 2022-12-14
Payer: COMMERCIAL

## 2022-12-14 ENCOUNTER — TELEMEDICINE (OUTPATIENT)
Dept: RADIATION ONCOLOGY | Facility: HOSPITAL | Age: 72
End: 2022-12-14

## 2022-12-14 DIAGNOSIS — C61 PROSTATE CANCER (HCC): Primary | ICD-10-CM

## 2022-12-14 NOTE — PROGRESS NOTES
Virtual Brief Visit - Radiation Oncology   Tyler Flowers 1950 66 y o  male [id number]    Verification of patient location: Patient is located in the following state in which I hold an active license [place]  Encounter Provider: Teresa Reyna MD  Provider Located at   [address] [place] [address]    After connecting through Telephone, the patient was identified by name and date of birth  Patient was informed that this is a telemedicine visit and that the visit is being conducted through Telephone  My office door was closed  No one else was in the room  He acknowledged consent and understanding of privacy and security of the platform  The patient has agreed to participate and understands he can discontinue the visit at any time  Patient is aware this is a billable service  Cancer Staging   Prostate cancer [facility])  Staging form: Prostate, AJCC 8th Edition  - Clinical stage from 12/1/2021: Stage IIIC (cT2a, cN0, cM0, PSA: 45 5, Grade Group: 5) - Signed by Teresa Reyna MD on 1/25/2022  Prostate specific antigen (PSA) range: 20 or greater  [place] primary pattern: 4  [place] secondary pattern: 5  Histologic grading system: 5 grade system    Assessment/Plan:  Tyler Flowers is a 66 y o  male with 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 ([id number], Sandhya 4+5, PSA 45 46) s/p RALP on 2/17/22 with Dr Soni Crowe, pathology notable for pT3bN0 disease (Sandhya 5+4, +EPE, +SVI (right), +LVI, and multifocal positive margins, with 0/18 LN involved)  His post-RP PSA at 2 months was 0 3 and at 5 months was 0 6  PSMA PET on 8/8/22 showed no suspicious uptake at the prostate bed or abdomen/pelvis  He was referred for adjuvant radiation therapy for persistent PSA  He received RT to whole pelvis/prostate bed in 39 fractions, initiated on 9/8/22 and completed on 11/3/22  He returns today for telemedicine one month end of treatment follow-up  He presents for 1 month f/u   He is doing quite well  He has recovered from usual expected toxicity of RT and no longer has diarrhea or problems with urination  He has seen urology in the interim  Continue clinical and PSA surveillance  2 24 22  Urology   RTC in 6 months  No orders of the defined types were placed in this encounter  History of Present Illness   Interval History:    11/29/22 - Urology - rFanki Manning MD  Patient received single dose of androgen deprivation therapy in late September 2022  Lupron will remain in system until March 2023 and true post surgical/radiation PSA will be known in early summer 2023  F/U in 3 months with PSA  Continue to follow with radiation oncology     He is doing quite well  He has recovered from usual expected toxicity of RT and no longer has diarrhea or problems with urination  Historical Information   Oncology History   Prostate cancer ([facility] )   12/1/2021 Initial Diagnosis    Prostate cancer ([facility] [address] )     12/1/2021 Biopsy    A  Prostate, right lateral base:  - Prostatic adenocarcinoma, Sandhya score 7, 4+3, Prognostic Grade Group 3, continuously involving 10% of 1 of 1 core  - Periprostatic fat invasion:Not identified  - Lymph-vascular invasion:  not identified  - Perineural invasion: identified  - Additional Pathologic Findings: none identified     B  Prostate, right lateral mid:  - Prostatic adenocarcinoma, [place] score 9, 4+5, Prognostic Grade Group 5, continuously involving 100% of 1 of 1 core  - Percentage pattern 4: 70%  - Percentage pattern 5: 5%  - Periprostatic fat invasion:Not identified  - Lymph-vascular invasion:  not identified  - Perineural invasion:Not identified  - Additional Pathologic Findings:  high-grade prostatic intraepithelial neoplasia     C: Prostate, right lateral apex:  - Prostatic adenocarcinoma, [place] score 9, 4+5, Prognostic Grade Group 5, continuously involving 100% of 1 of 1 core    - Percentage pattern 4: 60%  - Percentage pattern 5: 10%  - Periprostatic fat invasion:Not identified  - Lymph-vascular invasion:  not identified  - Perineural invasion: identified  - Additional Pathologic Findings:  high-grade prostatic intraepithelial neoplasia      D  Prostate, right base:  - Prostatic adenocarcinoma, Sandhya score 7, 4+3, Prognostic Grade Group 3, continuously involving 10% of 1 of 2 cores  - Periprostatic fat invasion:Not identified  - Lymph-vascular invasion:  not identified  - Perineural invasion: identified  - Additional Pathologic Findings: high-grade prostatic intraepithelial neoplasia      E  Prostate, right mid:  - Prostatic adenocarcinoma, [place] score 9, 4+5, Prognostic Grade Group 5, continuously involving 100% of 1 of 1 core  - Percentage pattern 4: 60%  - Percentage pattern 5: 10%  - Periprostatic fat invasion:Not identified  - Lymph-vascular invasion:  not identified  - Perineural invasion: identified  - Additional Pathologic Findings:  high-grade prostatic intraepithelial neoplasia       F  Prostate, right apex:  - Prostatic adenocarcinoma, [place] score 9, 4+5, Prognostic Grade Group 5, continuously involving 100% of 1 of 1 core  - Percentage pattern 4: 80%  - Percentage pattern 5: 10%  - Periprostatic fat invasion:Not identified  - Lymph-vascular invasion:  not identified  - Perineural invasion: identified  - Additional Pathologic Findings:  high-grade prostatic intraepithelial neoplasia       G  Prostate, left lateral base:  - Prostatic adenocarcinoma, Sandhya score 8, 4+4, Prognostic Grade Group 4, continuously involving 70% of 1 of 1 core  - Periprostatic fat invasion:Not identified  - Lymph-vascular invasion:  not identified  - Perineural invasion:Not identified  - Additional Pathologic Findings:  high-grade prostatic intraepithelial neoplasia       H  Prostate, left lateral mid:  - Prostatic adenocarcinoma, Sandhya score 8, 4+4, Prognostic Grade Group 4, continuously involving 70% of 1 of 1 core    - Periprostatic fat invasion:Not identified  - Lymph-vascular invasion:  not identified  - Perineural invasion:Not identified  - Additional Pathologic Findings:  high-grade prostatic intraepithelial neoplasia        I  Prostate, left lateral apex:  - Prostatic adenocarcinoma, [place] score 8, 4+4, Prognostic Grade Group 4, continuously involving 100% of 1 of 1 core  - Periprostatic fat invasion:Not identified  - Lymph-vascular invasion:  not identified  - Perineural invasion:Not identified  - Additional Pathologic Findings:  high-grade prostatic intraepithelial neoplasia       J  Prostate, left base:  - Prostatic adenocarcinoma, Sandhya score 8, 4+4, Prognostic Grade Group 4, continuously involving 60% of 1 of 1 core  - Periprostatic fat invasion:Not identified  - Lymph-vascular invasion:  not identified  - Perineural invasion:Not identified  - Additional Pathologic Findings:  high-grade prostatic intraepithelial neoplasia       K  Prostate, left mid:  - Prostatic adenocarcinoma, Sandhya score 8, 4+4, Prognostic Grade Group 4, continuously involving 30% of 1 of 1 core  - Periprostatic fat invasion:Not identified  - Lymph-vascular invasion:  not identified  - Perineural invasion:Not identified  - Additional Pathologic Findings: None identified       L  Prostate, left apex:  - Prostatic adenocarcinoma, Sandhya score 9, 4+5, Prognostic Grade Group 5, continuously involving 50% of 1 of 1 core    - Percentage pattern 4: 70%  - Percentage pattern 5: 10%  - Periprostatic fat invasion:Not identified  - Lymph-vascular invasion:  not identified  - Perineural invasion: identified  - Additional Pathologic Findings:  high-grade prostatic intraepithelial neoplasia        12/1/2021 -  Cancer Staged    Staging form: Prostate, AJCC 8th Edition  - Clinical stage from 12/1/2021: Stage IIIC (cT2a, cN0, cM0, PSA: 45 5, Grade Group: 5) - Signed by Kristin Echevarria MD on 1/25/2022  Prostate specific antigen (PSA) range: 20 or greater  [place] primary pattern: 4  [place] secondary pattern: 5  Histologic grading system: 5 grade system       2022 - 11/3/2022 Radiation    The patient [facility] for [facility] treatment  This is the current list of radiation treatment:  Radiation Treatments     No radiation treatments to show  (Treatments may have been administered in another system )               Past Medical History:   Diagnosis Date   • Cancer [facility])     prostate  dx 2021   • Colon polyp    • Hearing aid worn     b/l   • Hypertension    • Prostate cancer ([address] )    • Seasonal allergies    • Wears glasses      Past Surgical History:   Procedure Laterality Date   • APPENDECTOMY     • COLONOSCOPY     • EGD     • MN LAP,PROSTATECTOMY,RADICAL,W/NERVE SPARE,INCL ROBOTIC N/A 2022    Procedure: PROSTATECTOMY RADICAL LAPAROSCOPIC  W ROBOTICS, BPLND, LAPROSCOPIC LYSIS OF ADHESIONS;  Surgeon: Austin Marques MD;  Location: [facility];  Service: Urology   • PROSTATE BIOPSY     • TONSILLECTOMY       Social History   Social History     Substance and Sexual Activity   Alcohol Use Yes   • Alcohol/week: 14 0 standard drinks   • Types: 14 Standard drinks or equivalent per week    Comment: Beer and liquor daily-average 2-3 drinks/day        Social History     Substance and Sexual Activity   Drug Use Yes   • Types: Marijuana    Comment: marijuana   smokes  1 x daily  LD 2022     Social History     Tobacco Use   Smoking Status Former   • Packs/day: 0 50   • Years: 45 00   • Pack years: 22 50   • Types: Pipe, Cigarettes   • Quit date:    • Years since quittin 9   Smokeless Tobacco Never     Meds/Allergies     Current Outpatient Medications:   •  acetaminophen (TYLENOL) 325 mg tablet, Take 2 tablets (650 mg total) by mouth every 6 (six) hours as needed for mild pain (Patient not taking: No sig reported), Disp: 30 tablet, Rfl: 0  •  b complex vitamins capsule, Take 1 capsule by mouth daily (Patient not taking: No sig reported), Disp: , Rfl:   •  cholecalciferol (VITAMIN D3) 400 units tablet, Take 400 Units by mouth daily (Patient not taking: No sig reported), Disp: , Rfl:   •  COD LIVER OIL PO, Take by mouth (Patient not taking: No sig reported), Disp: , Rfl:   •  diphenhydrAMINE (BENADRYL) 25 mg tablet, Take 25 mg by mouth every 6 (six) hours as needed for itching (Patient not taking: Reported on 9/8/2022), Disp: , Rfl:   •  docusate sodium (COLACE) 100 mg capsule, Take 1 capsule (100 mg total) by mouth 2 (two) times a day (Patient not taking: No sig reported), Disp: 30 capsule, Rfl: 0  •  fluticasone (FLONASE) 50 mcg/act nasal spray, 1 spray into each nostril, Disp: , Rfl:   •  lisinopril (ZESTRIL) 20 mg tablet, Take 20 mg by mouth daily, Disp: , Rfl:   •  montelukast (SINGULAIR) 10 mg tablet, Take 1 tablet by mouth daily, Disp: , Rfl:   •  Turmeric 500 MG CAPS, Take by mouth (Patient not taking: No sig reported), Disp: , Rfl:   No Known Allergies      OBJECTIVE:   RESULTS  Lab Results:   Recent Results (from the past 672 hour(s))   PSA Total, Diagnostic    Collection Time: 11/25/22 10:50 AM   Result Value Ref Range    PSA, Diagnostic 0 1 0 0 - 4 0 ng/mL     Imaging Studies:No results found  Suni Killian MD  12/14/2022,3:38 PM    VIRTUAL VISIT DISCLAIMER  Patient verbally agrees to participate in [employer]  Pt is aware that [employer] could be limited without vital signs or the ability to perform a full hands-on physical exam  Patient understands he or the provider may request at any time to terminate the video visit and request the patient to seek care or treatment in person  Portions of the record may have been created with voice recognition software  Occasional wrong word or "sound a like" substitutions may have occurred due to the inherent limitations of voice recognition software  Read the chart carefully and recognize, using context, where substitutions have occurred

## 2023-02-27 ENCOUNTER — APPOINTMENT (OUTPATIENT)
Dept: LAB | Facility: CLINIC | Age: 73
End: 2023-02-27

## 2023-02-27 DIAGNOSIS — R97.20 ELEVATED PSA: ICD-10-CM

## 2023-02-27 DIAGNOSIS — C61 PROSTATE CANCER (HCC): ICD-10-CM

## 2023-02-27 LAB — PSA SERPL-MCNC: <0.1 NG/ML (ref 0–4)

## 2023-03-02 ENCOUNTER — OFFICE VISIT (OUTPATIENT)
Dept: UROLOGY | Facility: CLINIC | Age: 73
End: 2023-03-02

## 2023-03-02 VITALS
DIASTOLIC BLOOD PRESSURE: 90 MMHG | SYSTOLIC BLOOD PRESSURE: 136 MMHG | OXYGEN SATURATION: 95 % | HEIGHT: 68 IN | WEIGHT: 186 LBS | HEART RATE: 81 BPM | BODY MASS INDEX: 28.19 KG/M2

## 2023-03-02 DIAGNOSIS — C61 PROSTATE CANCER (HCC): Primary | ICD-10-CM

## 2023-03-02 NOTE — PROGRESS NOTES
3/2/2023      Chief Complaint   Patient presents with   • Follow-up     3 month follow up / PSA screen   • Prostate Cancer     PSA < 0 1         Assessment and Plan    67 y o  male managed by Dr Funmilayo Coburn    1  [id number] Sandhya 5+4=9 prostate cancer  - RALP-bPLND Feb 2022; +SVI, +focal margin  - Adjuvant XRT Sep 2022  - Lupron 45mg Sep 2022    He is now status post prostatectomy with adjuvant XRT and a single dose of Lupron androgen deprivation therapy  His current PSA is undetectable  Expect the effect of Lupron to wear off within the next month  We will check PSA 3 months from now and to assess his true posttreatment PSA victorina  He has no complaints today  Lab Results   Component Value Date    PSA <0 1 02/27/2023    PSA 0 1 11/25/2022    PSA 0 6 07/26/2022         History of Present Illness  Keke Holt is a 67 y o  male here for evaluation of 3-month follow-up prostate cancer  Diagnosed with [place] 9 prostate cancer with a PSA of 45 at the time of diagnosis  Underwent robot-assisted laparoscopic prostatectomy February 2022 by Dr Funmilayo Coburn  Postoperative course complicated with anastomotic leak which ultimately healed  Final pathology pT3b with invasion to the right seminal vesicle and a focal positive surgical margin  Postoperative PSA was 0 3 quickly up to 0 6  PSMA PET showed local prostate bed uptake without enrique or distant disease  He received a single dose of Lupron 45 mg September 2022 prior to starting his adjuvant whole pelvic radiation  He returns today in follow-up with an undetectable PSA  Diarrhea and urinary urgency has essentially resolved with time  Functional status and endurance is excellent  No incontinence pads  Review of Systems   Constitutional: Negative  Respiratory: Negative  Cardiovascular: Negative  Genitourinary: Negative for decreased urine volume, difficulty urinating, dysuria, flank pain, frequency, hematuria, scrotal swelling, testicular pain and urgency  Musculoskeletal: Negative  Vitals  Vitals:    03/02/23 1428   BP: 136/90   BP Location: Left arm   Patient Position: Sitting   Pulse: 81   SpO2: 95%   Weight: 84 4 kg (186 lb)   Height: 5' 8" (1 727 m)       Physical Exam      Past History  Past Medical History:   Diagnosis Date   • [facility] ([facility] )     prostate  dx 12/2021   • Colon polyp    • Hearing aid worn     b/l   • Hypertension    • Prostate cancer ([facility] )    • Seasonal allergies    • Wears glasses      Social History     Socioeconomic History   • Marital status: /Civil Union     Spouse name: Not on file   • Number of children: Not on file   • Years of education: Not on file   • Highest education level: Not on file   Occupational History   • Not on file   Tobacco Use   • Smoking status: Former     Packs/day: 0 50     Years: 45 00     Pack years: 22 50     Types: Pipe, Cigarettes     Quit date: 2013     Years since quitting: 10 1   • Smokeless tobacco: Never   Vaping Use   • Vaping Use: Never used   Substance and Sexual Activity   • Alcohol use: Yes     Alcohol/week: 14 0 standard drinks     Types: 14 Standard drinks or equivalent per week     Comment: Beer and liquor daily-average 2-3 drinks/day      • Drug use: Yes     Types: Marijuana     Comment: marijuana   smokes  1 x daily  LD 1/2022   • Sexual activity: Not Currently   Other Topics Concern   • Not on file   Social History Narrative   • Not on file     Social Determinants of Health     Financial Resource Strain: Not on file   Food Insecurity: Not on file   Transportation Needs: Not on file   Physical Activity: Not on file   Stress: Not on file   Social Connections: Not on file   Intimate Partner Violence: Not on file   Housing Stability: Not on file     Social History     Tobacco Use   Smoking Status Former   • Packs/day: 0 50   • Years: 45 00   • Pack years: 22 50   • Types: Pipe, Cigarettes   • Quit date: 2013   • Years since quitting: 10 1   Smokeless Tobacco Never     Family History   Problem Relation Age of Onset   • Cancer Father    • Cancer Mother        The following portions of the patient's history were reviewed and updated as appropriate: allergies, current medications, past medical history, past social history, past surgical history and problem list     Results  No results found for this or any previous visit (from the past 1 hour(s)) ]  Lab Results   Component Value Date    PSA <0 1 02/27/2023    PSA 0 1 11/25/2022    PSA 0 6 07/26/2022    PSA 0 3 04/08/2022     Lab Results   Component Value Date    CALCIUM 9 8 05/09/2022    K 4 3 05/09/2022    CO2 28 05/09/2022     05/09/2022    BUN 25 05/09/2022    CREATININE 1 25 05/09/2022     Lab Results   Component Value Date    WBC 7 40 10/10/2022    HGB 13 9 10/10/2022    HCT 42 6 10/10/2022     (H) 10/10/2022     10/10/2022

## 2023-05-30 ENCOUNTER — APPOINTMENT (OUTPATIENT)
Dept: LAB | Facility: CLINIC | Age: 73
End: 2023-05-30

## 2023-05-30 DIAGNOSIS — C61 PROSTATE CANCER (HCC): ICD-10-CM

## 2023-05-30 LAB — PSA SERPL-MCNC: 0.03 NG/ML (ref 0–4)

## 2023-06-02 ENCOUNTER — OFFICE VISIT (OUTPATIENT)
Dept: UROLOGY | Facility: CLINIC | Age: 73
End: 2023-06-02
Payer: COMMERCIAL

## 2023-06-02 VITALS
OXYGEN SATURATION: 98 % | DIASTOLIC BLOOD PRESSURE: 70 MMHG | WEIGHT: 177 LBS | SYSTOLIC BLOOD PRESSURE: 120 MMHG | HEART RATE: 84 BPM | HEIGHT: 68 IN | BODY MASS INDEX: 26.83 KG/M2

## 2023-06-02 DIAGNOSIS — C61 PROSTATE CANCER (HCC): Primary | ICD-10-CM

## 2023-06-02 PROCEDURE — 99213 OFFICE O/P EST LOW 20 MIN: CPT | Performed by: PHYSICIAN ASSISTANT

## 2023-06-02 NOTE — PROGRESS NOTES
"6/2/2023      Chief Complaint   Patient presents with   • Follow-up     3 month follow up PSA done on 2/27/23 0 1,   HCC         Assessment and Plan    67 y o  male managed by Dr Maya Heller    1  pT3b Sandhya 5+4=9 prostate cancer    Lab Results   Component Value Date    PSA 0 03 05/30/2023    PSA <0 1 02/27/2023    PSA 0 1 11/25/2022       Jose David Alba is doing very well  Good urinary control  No hematuria  Urinary and fecal urgency has resolved with time since completing radiation in the fall  His PSA is nice and low 0 03  No additional treatment planned for him  Will continue PSA monitoring every six months  Return in the fall  History of Present Illness  Reshma Maguire is a 67 y o  male here for evaluation of prostate cancer followup  RALP feb 2022 (+SVI, +focal margin) Dr Erwin Liriano and XRT Sep 2022  Here today no complaints        Review of Systems   Constitutional: Negative  Respiratory: Negative  Cardiovascular: Negative  Gastrointestinal: Negative for abdominal distention, anal bleeding, blood in stool and constipation  Genitourinary: Negative for decreased urine volume, difficulty urinating, dysuria, flank pain, frequency, hematuria and urgency  Musculoskeletal: Negative  Vitals  Vitals:    06/02/23 1317   BP: 120/70   BP Location: Left arm   Patient Position: Sitting   Cuff Size: Adult   Pulse: 84   SpO2: 98%   Weight: 80 3 kg (177 lb)   Height: 5' 8\" (1 727 m)       Physical Exam  Vitals and nursing note reviewed  Constitutional:       General: He is not in acute distress  Appearance: He is well-developed  He is not diaphoretic  HENT:      Head: Normocephalic and atraumatic  Pulmonary:      Effort: Pulmonary effort is normal    Musculoskeletal:      Right lower leg: No edema  Left lower leg: No edema  Skin:     General: Skin is warm and dry  Neurological:      Mental Status: He is alert and oriented to person, place, and time        Gait: Gait " normal    Psychiatric:         Speech: Speech normal          Behavior: Behavior normal            Past History  Past Medical History:   Diagnosis Date   • Cancer ([facility] )     prostate  dx 12/2021   • Colon polyp    • Hearing aid worn     b/l   • Hypertension    • Prostate cancer ([facility] )    • Seasonal allergies    • Wears glasses      Social History     Socioeconomic History   • Marital status: /Civil Union     Spouse name: None   • Number of children: None   • Years of education: None   • Highest education level: None   Occupational History   • None   Tobacco Use   • Smoking status: Former     Packs/day: 0 50     Years: 45 00     Total pack years: 22 50     Types: Pipe, Cigarettes     Quit date: 2013     Years since quitting: 10 4   • Smokeless tobacco: Never   Vaping Use   • Vaping Use: Never used   Substance and Sexual Activity   • Alcohol use: Yes     Alcohol/week: 14 0 standard drinks of alcohol     Types: 14 Standard drinks or equivalent per week     Comment: Beer and liquor daily-average 2-3 drinks/day      • Drug use: Yes     Types: Marijuana     Comment: marijuana   smokes  1 x daily  LD 1/2022   • Sexual activity: Not Currently   Other Topics Concern   • None   Social History Narrative   • None     Social Determinants of Health     Financial Resource Strain: Not on file   Food Insecurity: Not on file   Transportation Needs: Not on file   Physical Activity: Not on file   Stress: Not on file   Social Connections: Not on file   Intimate Partner Violence: Not on file   Housing Stability: Not on file     Social History     Tobacco Use   Smoking Status Former   • Packs/day: 0 50   • Years: 45 00   • Total pack years: 22 50   • Types: Pipe, Cigarettes   • Quit date: 2013   • Years since quitting: 10 4   Smokeless Tobacco Never     Family History   Problem Relation Age of Onset   • Cancer Father    • Cancer Mother        The following portions of the patient's history were reviewed and updated as appropriate: allergies, current medications, past medical history, past social history, past surgical history and problem list     Results  No results found for this or any previous visit (from the past 1 hour(s)) ]  Lab Results   Component Value Date    PSA 0 03 05/30/2023    PSA <0 1 02/27/2023    PSA 0 1 11/25/2022    PSA 0 6 07/26/2022     Lab Results   Component Value Date    BUN 25 05/09/2022    CALCIUM 9 8 05/09/2022     05/09/2022    CO2 28 05/09/2022    CREATININE 1 25 05/09/2022    K 4 3 05/09/2022     Lab Results   Component Value Date    HCT 42 6 10/10/2022    HGB 13 9 10/10/2022     (H) 10/10/2022     10/10/2022    WBC 7 40 10/10/2022

## 2023-06-13 ENCOUNTER — CLINICAL SUPPORT (OUTPATIENT)
Dept: RADIATION ONCOLOGY | Facility: CLINIC | Age: 73
End: 2023-06-13
Attending: INTERNAL MEDICINE
Payer: COMMERCIAL

## 2023-06-13 VITALS
BODY MASS INDEX: 26.58 KG/M2 | WEIGHT: 174.82 LBS | DIASTOLIC BLOOD PRESSURE: 93 MMHG | HEART RATE: 96 BPM | SYSTOLIC BLOOD PRESSURE: 159 MMHG | TEMPERATURE: 98.4 F | OXYGEN SATURATION: 96 %

## 2023-06-13 DIAGNOSIS — C61 PROSTATE CANCER (HCC): Primary | ICD-10-CM

## 2023-06-13 PROCEDURE — 99214 OFFICE O/P EST MOD 30 MIN: CPT | Performed by: INTERNAL MEDICINE

## 2023-06-13 PROCEDURE — 99211 OFF/OP EST MAY X REQ PHY/QHP: CPT | Performed by: INTERNAL MEDICINE

## 2023-06-13 NOTE — PROGRESS NOTES
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 [place] 9  (4+5) ) s/p RALP on 22  Pathology notable for pT3bN0 disease ([place] 5+4, +EPE, +SVI (right), +LVI, and multifocal positive margins, with 0/18 LN involved)  His post-RP PSA at 2 months was 0 3 and at 5 months was 0 6    He was referred for adjuvant radiation therapy for persistent PSA  He received RT to whole pelvis/prostate bed completed on 11/3/22  He was last seen via telemedicine on 22 and returns today for f/u visit  Lab Results   Component Value Date    PSA 0 03 2023    PSA <0 1 2023    PSA 0 1 2022      3/2/23  Urology  Received single dose of Lupron, current PSA is undetectable  Will check PSA in 3 months to assess true posttreatment PSA    23  Urology  PSA is low  No additional treatment planned  Will f/u in 6 months    Upcomin23  Urology            Follow up visit     Oncology History   Prostate cancer [facility])   2021 Initial Diagnosis    Prostate cancer ([facility] [address] )     2021 Biopsy    A  Prostate, right lateral base:  - Prostatic adenocarcinoma, Sandhya score 7, 4+3, Prognostic Grade Group 3, continuously involving 10% of 1 of 1 core  - Periprostatic fat invasion:Not identified  - Lymph-vascular invasion:  not identified  - Perineural invasion: identified  - Additional Pathologic Findings: none identified     B  Prostate, right lateral mid:  - Prostatic adenocarcinoma, [place]hy[place] score 9, 4+5, Prognostic Grade Group 5, continuously involving 100% of 1 of 1 core    - Percentage pattern 4: 70%  - Percentage pattern 5: 5%  - Periprostatic fat invasion:Not identified  - Lymph-vascular invasion:  not identified  - Perineural invasion:Not identified  - Additional Pathologic Findings:  high-grade prostatic intraepithelial neoplasia     C: Prostate, right lateral apex:  - Prostatic adenocarcinoma, [place] score 9, 4+5, Prognostic Grade Group 5, continuously involving 100% of 1 of 1 core  - Percentage pattern 4: 60%  - Percentage pattern 5: 10%  - Periprostatic fat invasion:Not identified  - Lymph-vascular invasion:  not identified  - Perineural invasion: identified  - Additional Pathologic Findings:  high-grade prostatic intraepithelial neoplasia      D  Prostate, right base:  - Prostatic adenocarcinoma, [place] score 7, 4+3, Prognostic Grade Group 3, continuously involving 10% of 1 of 2 cores  - Periprostatic fat invasion:Not identified  - Lymph-vascular invasion:  not identified  - Perineural invasion: identified  - Additional Pathologic Findings: high-grade prostatic intraepithelial neoplasia      E  Prostate, right mid:  - Prostatic adenocarcinoma, [place]hy[place] score 9, 4+5, Prognostic Grade Group 5, continuously involving 100% of 1 of 1 core  - Percentage pattern 4: 60%  - Percentage pattern 5: 10%  - Periprostatic fat invasion:Not identified  - Lymph-vascular invasion:  not identified  - Perineural invasion: identified  - Additional Pathologic Findings:  high-grade prostatic intraepithelial neoplasia       F  Prostate, right apex:  - Prostatic adenocarcinoma, [place]hy[place] score 9, 4+5, Prognostic Grade Group 5, continuously involving 100% of 1 of 1 core  - Percentage pattern 4: 80%  - Percentage pattern 5: 10%  - Periprostatic fat invasion:Not identified  - Lymph-vascular invasion:  not identified  - Perineural invasion: identified  - Additional Pathologic Findings:  high-grade prostatic intraepithelial neoplasia       G  Prostate, left lateral base:  - Prostatic adenocarcinoma, Sandhya score 8, 4+4, Prognostic Grade Group 4, continuously involving 70% of 1 of 1 core    - Periprostatic fat invasion:Not identified  - Lymph-vascular invasion:  not identified  - Perineural invasion:Not identified  - Additional Pathologic Findings:  high-grade prostatic intraepithelial neoplasia       H  Prostate, left lateral mid:  - Prostatic adenocarcinoma, [place]hy[place] score 8, 4+4, Prognostic Grade Group 4, continuously involving 70% of 1 of 1 core  - Periprostatic fat invasion:Not identified  - Lymph-vascular invasion:  not identified  - Perineural invasion:Not identified  - Additional Pathologic Findings:  high-grade prostatic intraepithelial neoplasia        I  Prostate, left lateral apex:  - Prostatic adenocarcinoma, [place]hy[place] score 8, 4+4, Prognostic Grade Group 4, continuously involving 100% of 1 of 1 core  - Periprostatic fat invasion:Not identified  - Lymph-vascular invasion:  not identified  - Perineural invasion:Not identified  - Additional Pathologic Findings:  high-grade prostatic intraepithelial neoplasia       J  Prostate, left base:  - Prostatic adenocarcinoma, [place] score 8, 4+4, Prognostic Grade Group 4, continuously involving 60% of 1 of 1 core  - Periprostatic fat invasion:Not identified  - Lymph-vascular invasion:  not identified  - Perineural invasion:Not identified  - Additional Pathologic Findings:  high-grade prostatic intraepithelial neoplasia       K  Prostate, left mid:  - Prostatic adenocarcinoma, Sandhy[place] score 8, 4+4, Prognostic Grade Group 4, continuously involving 30% of 1 of 1 core  - Periprostatic fat invasion:Not identified  - Lymph-vascular invasion:  not identified  - Perineural invasion:Not identified  - Additional Pathologic Findings: None identified       L  Prostate, left apex:  - Prostatic adenocarcinoma, [place] score 9, 4+5, Prognostic Grade Group 5, continuously involving 50% of 1 of 1 core    - Percentage pattern 4: 70%  - Percentage pattern 5: 10%  - Periprostatic fat invasion:Not identified  - Lymph-vascular invasion:  not identified  - Perineural invasion: identified  - Additional Pathologic Findings:  high-grade prostatic intraepithelial neoplasia        12/1/2021 -  Cancer Staged    Staging form: Prostate, AJCC 8th Edition  - Clinical stage from 12/1/2021: Stage IIIC (cT2a, cN0, cM0, PSA: 45 5, Grade Group: 5) - Signed by Sidney Fermin MD on "1/25/2022  Prostate specific antigen (PSA) range: 20 or greater  [place] primary pattern: 4  Sandhya secondary pattern: 5  Histologic grading system: 5 grade system       9/8/2022 - 11/3/2022 Radiation    The patient saw @[employer]@ for radiation treatment  This is the current list of radiation treatment:  Radiation Treatments     No radiation treatments to show  (Treatments may have been administered in another system )                 Review of Systems:  Review of Systems   Constitutional: Negative  HENT: Positive for hearing loss and rhinorrhea  Eyes: Positive for visual disturbance  Glasses   Respiratory: Positive for shortness of breath (with strenous activity)  Cardiovascular: Negative  Gastrointestinal: Negative  Endocrine: Negative  Genitourinary: Negative for dysuria and hematuria  Occasionally \"dribbles urine\"   Musculoskeletal: Positive for gait problem (r/t right hip/femur pain)  Allergic/Immunologic: Positive for environmental allergies  Negative for food allergies  Neurological: Positive for tremors  Psychiatric/Behavioral: Negative for sleep disturbance  Clinical Trial: no    IPSS Questionnaire (AUA-7): Over the past month…    1)  How often have you had a sensation of not emptying your bladder completely after you finish urinating? 0 - Not at all   2)  How often have you had to urinate again less than two hours after you finished urinating? 1 - Less than 1 time in 5   3)  How often have you found you stopped and started again several times when you urinated? 0 - Not at all   4) How difficult have you found it to postpone urination? 1 - Less than 1 time in 5   5) How often have you had a weak urinary stream?  0 - Not at all   6) How often have you had to push or strain to begin urination? 0 - Not at all   7) How many times did you most typically get up to urinate from the time you went to bed until the time you got up in the morning?   1 - 1 time   Total " Score:  3       Teaching completed    Health Maintenance   Topic Date Due   • Hepatitis C Screening  Never done   • Pneumococcal Vaccine: 65+ Years (1 - PCV) Never done   • BMI: Followup Plan  Never done   • Annual Physical  Never done   • DTaP,Tdap,and Td Vaccines (1 - Tdap) Never done   • Colorectal Cancer Screening  Never done   • Lung Cancer Screening  Never done   • Fall Risk  Never done   • Depression Screening  08/19/2023   • Influenza Vaccine (Season Ended) 09/01/2023   • BMI: Adult  06/02/2024   • COVID-19 Vaccine  Completed   • HIB Vaccine  Aged Out   • IPV Vaccine  Aged Out   • Hepatitis A Vaccine  Aged Out   • Meningococcal ACWY Vaccine  Aged Out   • HPV Vaccine  Aged Out     Patient Active Problem List   Diagnosis   • Prostate cancer ([facility] [address] )   • Hypertensive kidney disease with stage 3a chronic kidney disease ([facility] [address] )   • Essential hypertension     Past Medical History:   Diagnosis Date   • Cancer ([facility] [address] )     prostate  dx 12/2021   • Colon polyp    • Hearing aid worn     b/l   • Hypertension    • Prostate cancer ([facility] [address] )    • Seasonal allergies    • Wears glasses      Past Surgical History:   Procedure Laterality Date   • APPENDECTOMY     • COLONOSCOPY     • EGD     • IN LAPS SURG [id number] RPBIC RAD W/NRV SPARING ROBOT N/A 2/17/2022    Procedure: PROSTATECTOMY RADICAL LAPAROSCOPIC  W ROBOTICS, BPLND, LAPROSCOPIC LYSIS OF ADHESIONS;  Surgeon: Phyllis Fermin MD;  Location: [facility];  Service: Urology   • PROSTATE BIOPSY     • TONSILLECTOMY       Family History   Problem Relation Age of Onset   • Cancer Father    • Cancer Mother      Social History     Socioeconomic History   • Marital status: /Civil Union     Spouse name: Not on file   • Number of children: Not on file   • Years of education: Not on file   • Highest education level: Not on file   Occupational History   • Not on file   Tobacco Use   • Smoking status: Former     Packs/day: 0 50     Years: 45 00     Total pack years: 22 50     Types: Pipe, Cigarettes     Quit date: 2013     Years since quitting: 10 4   • Smokeless tobacco: Never   Vaping Use   • Vaping Use: Never used   Substance and Sexual Activity   • Alcohol use: Yes     Alcohol/week: 14 0 standard drinks of alcohol     Types: 14 Standard drinks or equivalent per week     Comment: Beer and liquor daily-average 2-3 drinks/day      • Drug use: Yes     Types: Marijuana     Comment: marijuana   smokes  1 x daily  LD 1/2022   • Sexual activity: Not Currently   Other Topics Concern   • Not on file   Social History Narrative   • Not on file     Social Determinants of Health     Financial Resource Strain: Not on file   Food Insecurity: Not on file   Transportation Needs: Not on file   Physical Activity: Not on file   Stress: Not on file   Social Connections: Not on file   Intimate Partner Violence: Not on file   Housing Stability: Not on file       Current Outpatient Medications:   •  acetaminophen (TYLENOL) 325 mg tablet, Take 2 tablets (650 mg total) by mouth every 6 (six) hours as needed for mild pain (Patient not taking: Reported on 3/7/2022), Disp: 30 tablet, Rfl: 0  •  b complex vitamins capsule, Take 1 capsule by mouth daily (Patient not taking: Reported on 4/12/2022), Disp: , Rfl:   •  cholecalciferol (VITAMIN D3) 400 units tablet, Take 400 Units by mouth daily (Patient not taking: Reported on 4/12/2022), Disp: , Rfl:   •  COD LIVER OIL PO, Take by mouth (Patient not taking: Reported on 3/7/2022), Disp: , Rfl:   •  diphenhydrAMINE (BENADRYL) 25 mg tablet, Take 25 mg by mouth every 6 (six) hours as needed for itching (Patient not taking: Reported on 9/8/2022), Disp: , Rfl:   •  docusate sodium (COLACE) 100 mg capsule, Take 1 capsule (100 mg total) by mouth 2 (two) times a day (Patient not taking: Reported on 3/7/2022), Disp: 30 capsule, Rfl: 0  •  fluticasone (FLONASE) 50 mcg/act nasal spray, 1 spray into each nostril, Disp: , Rfl:   •  lisinopril (ZESTRIL) 20 mg tablet, Take 20 mg by mouth daily, Disp: , Rfl:   •  montelukast (SINGULAIR) 10 mg tablet, Take 1 tablet by mouth daily, Disp: , Rfl:   •  Turmeric 500 MG CAPS, Take by mouth (Patient not taking: Reported on 3/7/2022), Disp: , Rfl:   No Known Allergies  There were no vitals filed for this visit

## 2023-06-13 NOTE — PROGRESS NOTES
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 ([id number], [place] 4+5, PSA 45 46) s/p RALP on 2/17/22 with Dr Telly Ferguson, pathology notable for pT3bN0 disease ([place] 5+4, +EPE, +SVI (right), +LVI, and multifocal positive margins, with 0/18 LN involved)  His post-RP PSA at 2 months was 0 3 and at 5 months was 0 6  PSMA PET on 8/8/22 showed no suspicious uptake at the prostate bed or abdomen/pelvis  He was referred for adjuvant radiation therapy for persistent PSA  He received RT to whole pelvis/prostate bed in 39 fractions, initiated on 9/8/22 and completed on 11/3/22  He returns for routine f/u now 7 months after completion of RT  He has no developing late toxicity of radiation therapy  He has completed ADT  His PSA is low at 0 03 and will continue to be monitored   -Continue clinical and radiographic surveillance  -He has an upcoming urology follow-up in 6 months  RTC in 1 year  Babita Barreto MD  Department [place] [id number]    No orders of the defined types were placed in this encounter  History of Present Illness   Interval History:  John Giordano 1950 is a 67 y o  male with 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 [place] 9  (4+5) ) s/p RALP on 2/17/22  Pathology notable for pT3bN0 disease ([place] 5+4, +EPE, +SVI (right), +LVI, and multifocal positive margins, with 0/18 LN involved)  His post-RP PSA at 2 months was 0 3 and at 5 months was 0 6      He was referred for adjuvant radiation therapy for persistent PSA  He received RT to whole pelvis/prostate bed completed on 11/3/22  He was last seen via telemedicine on 22 and returns today for f/u visit            Lab Results   Component Value Date     PSA 0 03 2023     PSA <0 1 2023     PSA 0 1 2022      3/2/23  Urology  Received single dose of Lupron, current PSA is undetectable  Will check PSA in 3 months to assess true posttreatment PSA     23  Urology  PSA is low  No additional treatment planned  Will f/u in 6 months     Upcomin23  Urology    Today he feels well overall  He has mild dribbling requiring pads when he is out of the house  He has mild frequency  He has mild nocturia 1 time per night  He has no changes to his bowel habits  He otherwise reports feeling well  He continues close follow-up with [facility] PCP at the [facility] Information   Oncology History   Prostate cancer ([facility] [address] )   2021 Initial Diagnosis    Prostate cancer ([facility] [address] )     2021 Biopsy    A  Prostate, right lateral base:  - Prostatic adenocarcinoma, [place] score 7, 4+3, Prognostic Grade Group 3, continuously involving 10% of 1 of 1 core  - Periprostatic fat invasion:Not identified  - Lymph-vascular invasion:  not identified  - Perineural invasion: identified  - Additional Pathologic Findings: none identified     B  Prostate, right lateral mid:  - Prostatic adenocarcinoma, [place] score 9, 4+5, Prognostic Grade Group 5, continuously involving 100% of 1 of 1 core  - Percentage pattern 4: 70%  - Percentage pattern 5: 5%  - Periprostatic fat invasion:Not identified  - Lymph-vascular invasion:  not identified  - Perineural invasion:Not identified  - Additional Pathologic Findings:  high-grade prostatic intraepithelial neoplasia     C: Prostate, right lateral apex:  - Prostatic adenocarcinoma, [place] score 9, 4+5, Prognostic Grade Group 5, continuously involving 100% of 1 of 1 core    - Percentage pattern 4: 60%  - Percentage pattern 5: 10%  - Periprostatic fat invasion:Not identified  - Lymph-vascular invasion:  not identified  - Perineural invasion: identified  - Additional Pathologic Findings:  high-grade prostatic intraepithelial neoplasia      D  Prostate, right base:  - Prostatic adenocarcinoma, [place] score 7, 4+3, Prognostic Grade Group 3, continuously involving 10% of 1 of 2 cores  - Periprostatic fat invasion:Not identified  - Lymph-vascular invasion:  not identified  - Perineural invasion: identified  - Additional Pathologic Findings: high-grade prostatic intraepithelial neoplasia      E  Prostate, right mid:  - Prostatic adenocarcinoma, [place] score 9, 4+5, Prognostic Grade Group 5, continuously involving 100% of 1 of 1 core  - Percentage pattern 4: 60%  - Percentage pattern 5: 10%  - Periprostatic fat invasion:Not identified  - Lymph-vascular invasion:  not identified  - Perineural invasion: identified  - Additional Pathologic Findings:  high-grade prostatic intraepithelial neoplasia       F  Prostate, right apex:  - Prostatic adenocarcinoma, [place] score 9, 4+5, Prognostic Grade Group 5, continuously involving 100% of 1 of 1 core  - Percentage pattern 4: 80%  - Percentage pattern 5: 10%  - Periprostatic fat invasion:Not identified  - Lymph-vascular invasion:  not identified  - Perineural invasion: identified  - Additional Pathologic Findings:  high-grade prostatic intraepithelial neoplasia       G  Prostate, left lateral base:  - Prostatic adenocarcinoma, [place]hy[place] score 8, 4+4, Prognostic Grade Group 4, continuously involving 70% of 1 of 1 core    - Periprostatic fat invasion:Not identified  - Lymph-vascular invasion:  not identified  - Perineural invasion:Not identified  - Additional Pathologic Findings:  high-grade prostatic intraepithelial neoplasia       H  Prostate, left lateral mid:  - Prostatic adenocarcinoma, [place] score 8, 4+4, Prognostic Grade Group 4, continuously involving 70% of 1 of 1 core  - Periprostatic fat invasion:Not identified  - Lymph-vascular invasion:  not identified  - Perineural invasion:Not identified  - Additional Pathologic Findings:  high-grade prostatic intraepithelial neoplasia        I  Prostate, left lateral apex:  - Prostatic adenocarcinoma, [place] score 8, 4+4, Prognostic Grade Group 4, continuously involving 100% of 1 of 1 core  - Periprostatic fat invasion:Not identified  - Lymph-vascular invasion:  not identified  - Perineural invasion:Not identified  - Additional Pathologic Findings:  high-grade prostatic intraepithelial neoplasia       J  Prostate, left base:  - Prostatic adenocarcinoma, [place] score 8, 4+4, Prognostic Grade Group 4, continuously involving 60% of 1 of 1 core  - Periprostatic fat invasion:Not identified  - Lymph-vascular invasion:  not identified  - Perineural invasion:Not identified  - Additional Pathologic Findings:  high-grade prostatic intraepithelial neoplasia       K  Prostate, left mid:  - Prostatic adenocarcinoma, [place] score 8, 4+4, Prognostic Grade Group 4, continuously involving 30% of 1 of 1 core  - Periprostatic fat invasion:Not identified  - Lymph-vascular invasion:  not identified  - Perineural invasion:Not identified  - Additional Pathologic Findings: None identified       L  Prostate, left apex:  - Prostatic adenocarcinoma, [place] score 9, 4+5, Prognostic Grade Group 5, continuously involving 50% of 1 of 1 core    - Percentage pattern 4: 70%  - Percentage pattern 5: 10%  - Periprostatic fat invasion:Not identified  - Lymph-vascular invasion:  not identified  - Perineural invasion: identified  - Additional Pathologic Findings:  high-grade prostatic intraepithelial neoplasia        12/1/2021 -  Cancer Staged    Staging form: Prostate, AJCC 8th Edition  - Clinical stage from 12/1/2021: Stage IIIC (cT2a, cN0, cM0, PSA: 45 5, Grade Group: 5) - Signed by Eneida Marquez MD on 1/25/2022  Prostate specific antigen (PSA) range: 20 or greater  [place] primary pattern: 4  [place] secondary pattern: 5  Histologic grading system: 5 grade system       9/8/2022 - 11/3/2022 Radiation    The patient saw @[facility]@ for radiation treatment  This is the current list of radiation treatment:  Radiation Treatments     No radiation treatments to show  (Treatments may have been administered in another system )               Past Medical History:   Diagnosis Date   • Cancer [facility])     prostate  dx 12/2021   • Colon polyp    • Hearing aid worn     b/l   • Hypertension    • Prostate cancer ([address] )    • Seasonal allergies    • Wears glasses      Past Surgical History:   Procedure Laterality Date   • APPENDECTOMY     • COLONOSCOPY     • EGD     • UT LAPS SURG [id number] RPBIC RAD W/NRV SPARING ROBOT N/A 2/17/2022    Procedure: PROSTATECTOMY RADICAL LAPAROSCOPIC  W ROBOTICS, BPLND, LAPROSCOPIC LYSIS OF ADHESIONS;  Surgeon: Julienne Guallpa MD;  Location: [facility] OR;  Service: Urology   • PROSTATE BIOPSY     • TONSILLECTOMY       Social History   Social History     Substance and Sexual Activity   Alcohol Use Yes   • Alcohol/week: 14 0 standard drinks of alcohol   • Types: 14 Standard drinks or equivalent per week    Comment: Beer and liquor daily-average 2-3 drinks/day        Social History     Substance and Sexual Activity   Drug Use Yes   • Types: Marijuana    Comment: marijuana   smokes  1 x daily  LD 1/2022     Social History     Tobacco Use   Smoking Status Former   • Packs/day: 0 50   • Years: 45 00   • Total pack years: 22 50   • Types: Pipe, Cigarettes   • Quit date: 2013   • Years since quitting: 10 4   Smokeless Tobacco Never       Meds/Allergies     Current Outpatient Medications:   •  cholecalciferol (VITAMIN D3) 400 units tablet, Take 400 Units by mouth daily, Disp: , Rfl:   •  COD LIVER OIL PO, Take by mouth, Disp: , Rfl:   •  diphenhydrAMINE (BENADRYL) 25 mg tablet, Take 25 mg by mouth every 6 (six) hours as needed for itching, Disp: , "Rfl:   •  fluticasone (FLONASE) 50 mcg/act nasal spray, 1 spray into each nostril, Disp: , Rfl:   •  lisinopril (ZESTRIL) 20 mg tablet, Take 20 mg by mouth daily, Disp: , Rfl:   •  montelukast (SINGULAIR) 10 mg tablet, Take 1 tablet by mouth daily, Disp: , Rfl:   •  acetaminophen (TYLENOL) 325 mg tablet, Take 2 tablets (650 mg total) by mouth every 6 (six) hours as needed for mild pain (Patient not taking: Reported on 3/7/2022), Disp: 30 tablet, Rfl: 0  •  b complex vitamins capsule, Take 1 capsule by mouth daily (Patient not taking: Reported on 2022), Disp: , Rfl:   •  docusate sodium (COLACE) 100 mg capsule, Take 1 capsule (100 mg total) by mouth 2 (two) times a day (Patient not taking: Reported on 3/7/2022), Disp: 30 capsule, Rfl: 0  •  Turmeric 500 MG CAPS, Take by mouth (Patient not taking: Reported on 3/7/2022), Disp: , Rfl:   No Known Allergies    Review of Systems:  Refer to nursing note    OBJECTIVE:   /93 (Patient Position: Sitting, Cuff Size: Standard)   Pulse 96   Temp 98 4 °F (36 9 °C)   Wt 79 3 kg (174 lb 13 2 oz)   SpO2 96%   BMI 26 58 kg/m²   Pain Assessment:  0  Performance Status: ECO - Asymptomatic    Physical Exam:   General Appearance:  Alert, cooperative, no distress, appears stated age  Lungs: Respirations unlabored, no cyanosis, able to speak in complete sentences without dyspnea  TAZ: Deferred given low PSA  Extremities: No cyanosis or edema  Skin: No generalized rash or dermatitis  Neurologic: ANOx3, speech and cognition intact  Portions of the record may have been created with voice recognition software  Occasional wrong word or \"sound a like\" substitutions may have occurred due to the inherent limitations of voice recognition software  Read the chart carefully and recognize, using context, where substitutions have occurred    "

## 2023-11-28 ENCOUNTER — APPOINTMENT (OUTPATIENT)
Dept: LAB | Facility: CLINIC | Age: 73
End: 2023-11-28
Payer: COMMERCIAL

## 2023-11-28 DIAGNOSIS — C61 PROSTATE CANCER (HCC): ICD-10-CM

## 2023-11-28 LAB — PSA SERPL-MCNC: 0.03 NG/ML (ref 0–4)

## 2023-11-28 PROCEDURE — 36415 COLL VENOUS BLD VENIPUNCTURE: CPT

## 2023-11-28 PROCEDURE — 84153 ASSAY OF PSA TOTAL: CPT

## 2023-12-05 ENCOUNTER — OFFICE VISIT (OUTPATIENT)
Dept: UROLOGY | Facility: CLINIC | Age: 73
End: 2023-12-05
Payer: COMMERCIAL

## 2023-12-05 VITALS
OXYGEN SATURATION: 99 % | SYSTOLIC BLOOD PRESSURE: 150 MMHG | WEIGHT: 180 LBS | HEART RATE: 101 BPM | HEIGHT: 68 IN | BODY MASS INDEX: 27.28 KG/M2 | DIASTOLIC BLOOD PRESSURE: 88 MMHG

## 2023-12-05 DIAGNOSIS — C61 PROSTATE CANCER (HCC): Primary | ICD-10-CM

## 2023-12-05 PROCEDURE — 99214 OFFICE O/P EST MOD 30 MIN: CPT | Performed by: PHYSICIAN ASSISTANT

## 2023-12-05 RX ORDER — LANOLIN ALCOHOL/MO/W.PET/CERES
1000 CREAM (GRAM) TOPICAL DAILY
COMMUNITY

## 2023-12-05 NOTE — PROGRESS NOTES
12/5/2023      Chief Complaint   Patient presents with    Follow-up     6 month/ PSA 0.03 PT states it depends how he is drinking he urinates more often but states he has always been like that     Prostate Cancer         Assessment and Plan    67 y.o. male managed by Dr Rad Sena    1. pT3b Sandhya 5+ 4 = prostate cancer    Radical prostatectomy February 2022  Lupron 6 month depot September 2022  XRT September 2022    Peggy Stark is doing well. [facility] and here (Dec 2024) with PSA each visit. Lab Results   Component Value Date    PSA 0.03 11/28/2023    PSA 0.03 05/30/2023    PSA <0.1 02/27/2023             History of Present Illness  Son Wayne is a 67 y.o. male here for evaluation of 6-month follow-up high risk prostate cancer. He underwent radical prostatectomy in February 2022 for Sandhya 5+4 equal 9 prostate cancer had a positive focal margin and seminal vesicle invasion. First postoperative PSA was 0.3 then up to 0.6 . He received adjuvant Lupron 6-month Depo and radiation therapy which she completed later that year in September 2022. PSAs are less than 0.1. He has had no hematuria rectal bleeding or voiding concerns. His previous urinary and fecal urgency has resolved over the year. Review of Systems   Constitutional: Negative. Respiratory: Negative. Cardiovascular: Negative. Genitourinary:  Negative for decreased urine volume, difficulty urinating, dysuria, flank pain, frequency, hematuria and urgency. Musculoskeletal: Negative. Vitals  Vitals:    12/05/23 1329   BP: 150/88   BP Location: Left arm   Patient Position: Sitting   Cuff Size: Standard   Pulse: 101   SpO2: 99%   Weight: 81.6 kg (180 lb)   Height: 5' 8" (1.727 m)       Physical Exam  Vitals and nursing note reviewed. Constitutional:       General: He is not in acute distress. Appearance: Normal appearance. He is well-developed. He is not diaphoretic. HENT:      Head: Normocephalic and atraumatic. Pulmonary:      Effort: Pulmonary effort is normal.      Comments: No cough or audible wheeze  Abdominal:      General: There is no distension. Tenderness: There is no abdominal tenderness. There is no right CVA tenderness or left CVA tenderness. Musculoskeletal:      Right lower leg: No edema. Left lower leg: No edema. Skin:     General: Skin is warm and dry. Neurological:      Mental Status: He is alert and oriented to person, place, and time. Gait: Gait normal.   Psychiatric:         Speech: Speech normal.         Behavior: Behavior normal.           Past History  Past Medical History:   Diagnosis Date    Cancer ([address])     prostate  dx 12/2021    Colon polyp     Hearing aid worn     b/l    Hypertension     Prostate cancer ([address])     Seasonal allergies     Wears glasses      Social History     Socioeconomic History    Marital status: /Civil Union     Spouse name: None    Number of children: None    Years of education: None    Highest education level: None   Occupational History    None   Tobacco Use    Smoking status: Former     Packs/day: 0.50     Years: 45.00     Total pack years: 22.50     Types: Pipe, Cigarettes     Quit date: 2013     Years since quitting: 10.9    Smokeless tobacco: Never   Vaping Use    Vaping Use: Never used   Substance and Sexual Activity    Alcohol use: Yes     Alcohol/week: 14.0 standard drinks of alcohol     Types: 14 Standard drinks or equivalent per week     Comment: Beer and liquor daily-average 2-3 drinks/day.      Drug use: Yes     Types: Marijuana     Comment: marijuana   smokes  1 x daily  LD 1/2022    Sexual activity: Not Currently   Other Topics Concern    None   Social History Narrative    None     Social Determinants of Health     Financial Resource Strain: Not on file   Food Insecurity: Not on file   Transportation Needs: Not on file   Physical Activity: Not on file   Stress: Not on file   Social Connections: Not on file   Intimate Partner Violence: Not on file   Housing Stability: Not on file     Social History     Tobacco Use   Smoking Status Former    Packs/day: 0.50    Years: 45.00    Total pack years: 22.50    Types: Pipe, Cigarettes    Quit date: 2013    Years since quitting: 10.9   Smokeless Tobacco Never     Family History   Problem Relation Age of Onset    Cancer Father     Cancer Mother        The following portions of the patient's history were reviewed and updated as appropriate: allergies, current medications, past medical history, past social history, past surgical history and problem list.    Results  No results found for this or any previous visit (from the past 1 hour(s)).]  Lab Results   Component Value Date    PSA 0.03 11/28/2023    PSA 0.03 05/30/2023    PSA <0.1 02/27/2023    PSA 0.1 11/25/2022     Lab Results   Component Value Date    CALCIUM 9.8 05/09/2022    K 4.3 05/09/2022    CO2 28 05/09/2022     05/09/2022    BUN 25 05/09/2022    CREATININE 1.25 05/09/2022     Lab Results   Component Value Date    WBC 7.40 10/10/2022    HGB 13.9 10/10/2022    HCT 42.6 10/10/2022     (H) 10/10/2022     10/10/2022

## 2024-06-10 ENCOUNTER — RADIATION ONCOLOGY FOLLOW-UP (OUTPATIENT)
Dept: RADIATION ONCOLOGY | Facility: CLINIC | Age: 74
End: 2024-06-10
Attending: INTERNAL MEDICINE
Payer: COMMERCIAL

## 2024-06-10 VITALS
HEART RATE: 80 BPM | SYSTOLIC BLOOD PRESSURE: 170 MMHG | WEIGHT: 178 LBS | OXYGEN SATURATION: 99 % | TEMPERATURE: 98.2 F | DIASTOLIC BLOOD PRESSURE: 92 MMHG | BODY MASS INDEX: 27.06 KG/M2

## 2024-06-10 DIAGNOSIS — C61 PROSTATE CANCER (HCC): Primary | ICD-10-CM

## 2024-06-10 PROCEDURE — 99213 OFFICE O/P EST LOW 20 MIN: CPT | Performed by: INTERNAL MEDICINE

## 2024-06-10 PROCEDURE — 99211 OFF/OP EST MAY X REQ PHY/QHP: CPT | Performed by: INTERNAL MEDICINE

## 2024-06-10 NOTE — PROGRESS NOTES
Follow-up - Radiation Oncology   Rom Landon 1950 73 y.o. male [id number]    Cancer Staging   Prostate cancer (HCC)  Staging form: Prostate, AJCC 8th Edition  - Clinical stage from 12/1/2021: Stage IIIC (cT2a, cN0, cM0, PSA: 45.5, Grade Group: 5) - Signed by Liss Grewal MD on 1/25/2022  Prostate specific antigen (PSA) range: 20 or greater  Sandhya primary pattern: 4  Sandhya secondary pattern: 5  Histologic grading system: 5 grade system    Assessment/Plan:  Rom Landon is a 71 y.o. male with 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 ([id number], [place] 4+5, PSA 45.46) s/p RALP on 2/17/22 with Dr. Couch, pathology notable for pT3bN0 disease ([place] 5+4, +EPE, +SVI (right), +LVI, and multifocal positive margins, with 0/18 LN involved). His post-RP PSA at 2 months was 0.3 and at 5 months was 0.6. He was referred for adjuvant radiation therapy for persistent PSA. He received RT to whole pelvis/prostate bed completing on 11/3/22.    He returns for routine f/u now 1 year and 7 months after completion of RT. He has no developing late toxicity of radiation therapy. He has completed ADT. His last PSA was low at 0.03 and had orders to obtain one this month but this was not done. Existing lab slip was printed again.  -Continue clinical and radiographic surveillance  -He has an upcoming urology follow-up in 6 months  RTC in 1 year. He questions about the need for continued f/u in [facility]. I explained that this is his choice, but emphasized the importance of continued PSA surveillance. He understood. His PSA labslips were provided to him.    Liss Grewal MD  Department of Radiation Oncology  [facility]    No orders of the defined types were placed in this encounter.     Total Time Spent  20 minutes spent reviewing EMR in preparation for visit, with the patient, coordination with other providers, and documentation.    History of Present Illness   Interval History:  Rom Landon 1950 is a  73 y.o. 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   Pathology notable for pT3bN0 disease ([place] 5+4, +EPE, +SVI (right), +LVI, and multifocal positive margins, with 0/18 LN involved). His post-RP PSA at 2 months was 0.3 and at 5 months was 0.6.  He was referred for adjuvant radiation therapy for persistent PSA.  He received RT to whole pelvis/prostate bed completed on 11/3/22. He was last  on 2023 and returns today for f/u visit.      PSA TREND    PSA   Latest Ref Rng 0.00 - 4.00 ng/mL   2022 0.3    2022 0.6    2022 0.1    2023 <0.1    2023 0.03    2023 0.03      2023  [facility]  Doing well.  Will alternate visits with radiation oncology every 6 months     Upcomin/10/24  Urology    Today he feels well overall.  He previously reported mild dribbling requiring pads when he is out of the house.  He has mild urinary symptoms. His IPSS score is 5.    Historical Information   Oncology History   Prostate cancer ([facility])   2021 Initial Diagnosis    Prostate cancer ([facility])     2021 Biopsy    A. Prostate, right lateral base:  - Prostatic adenocarcinoma, Sandhya score 7, 4+3, Prognostic Grade Group 3, continuously involving 10% of 1 of 1 core.  - Periprostatic fat invasion:Not identified  - Lymph-vascular invasion:  not identified  - Perineural invasion: identified  - Additional Pathologic Findings: none identified     B. Prostate, right lateral mid:  - Prostatic adenocarcinoma, [place]oca score 9, 4+5, Prognostic Grade Group 5, continuously involving 100% of 1 of 1 core.  - Percentage pattern 4: 70%  - Percentage pattern 5: 5%  - Periprostatic fat invasion:Not identified  - Lymph-vascular invasion:  not identified  - Perineural invasion:Not identified  - Additional Pathologic Findings:  high-grade prostatic intraepithelial neoplasia     C: Prostate, right lateral apex:  - Prostatic adenocarcinoma, Sandhya score 9,  4+5, Prognostic Grade Group 5, continuously involving 100% of 1 of 1 core.  - Percentage pattern 4: 60%  - Percentage pattern 5: 10%  - Periprostatic fat invasion:Not identified  - Lymph-vascular invasion:  not identified  - Perineural invasion: identified  - Additional Pathologic Findings:  high-grade prostatic intraepithelial neoplasia      D. Prostate, right base:  - Prostatic adenocarcinoma, Gray score 7, 4+3, Prognostic Grade Group 3, continuously involving 10% of 1 of 2 cores.  - Periprostatic fat invasion:Not identified  - Lymph-vascular invasion:  not identified  - Perineural invasion: identified  - Additional Pathologic Findings: high-grade prostatic intraepithelial neoplasia      E. Prostate, right mid:  - Prostatic adenocarcinoma, Sandhya score 9, 4+5, Prognostic Grade Group 5, continuously involving 100% of 1 of 1 core.  - Percentage pattern 4: 60%  - Percentage pattern 5: 10%  - Periprostatic fat invasion:Not identified  - Lymph-vascular invasion:  not identified  - Perineural invasion: identified  - Additional Pathologic Findings:  high-grade prostatic intraepithelial neoplasia       F. Prostate, right apex:  - Prostatic adenocarcinoma, Gray score 9, 4+5, Prognostic Grade Group 5, continuously involving 100% of 1 of 1 core.  - Percentage pattern 4: 80%  - Percentage pattern 5: 10%  - Periprostatic fat invasion:Not identified  - Lymph-vascular invasion:  not identified  - Perineural invasion: identified  - Additional Pathologic Findings:  high-grade prostatic intraepithelial neoplasia       G. Prostate, left lateral base:  - Prostatic adenocarcinoma, Gray score 8, 4+4, Prognostic Grade Group 4, continuously involving 70% of 1 of 1 core.  - Periprostatic fat invasion:Not identified  - Lymph-vascular invasion:  not identified  - Perineural invasion:Not identified  - Additional Pathologic Findings:  high-grade prostatic intraepithelial neoplasia       H. Prostate, left lateral mid:  - Prostatic  adenocarcinoma, Sandhya score 8, 4+4, Prognostic Grade Group 4, continuously involving 70% of 1 of 1 core.  - Periprostatic fat invasion:Not identified  - Lymph-vascular invasion:  not identified  - Perineural invasion:Not identified  - Additional Pathologic Findings:  high-grade prostatic intraepithelial neoplasia        I. Prostate, left lateral apex:  - Prostatic adenocarcinoma, [place] score 8, 4+4, Prognostic Grade Group 4, continuously involving 100% of 1 of 1 core.  - Periprostatic fat invasion:Not identified  - Lymph-vascular invasion:  not identified  - Perineural invasion:Not identified  - Additional Pathologic Findings:  high-grade prostatic intraepithelial neoplasia       J. Prostate, left base:  - Prostatic adenocarcinoma, Sandhya score 8, 4+4, Prognostic Grade Group 4, continuously involving 60% of 1 of 1 core.  - Periprostatic fat invasion:Not identified  - Lymph-vascular invasion:  not identified  - Perineural invasion:Not identified  - Additional Pathologic Findings:  high-grade prostatic intraepithelial neoplasia       K. Prostate, left mid:  - Prostatic adenocarcinoma, [place] score 8, 4+4, Prognostic Grade Group 4, continuously involving 30% of 1 of 1 core.  - Periprostatic fat invasion:Not identified  - Lymph-vascular invasion:  not identified  - Perineural invasion:Not identified  - Additional Pathologic Findings: None identified       L. Prostate, left apex:  - Prostatic adenocarcinoma, Sandhya score 9, 4+5, Prognostic Grade Group 5, continuously involving 50% of 1 of 1 core.  - Percentage pattern 4: 70%  - Percentage pattern 5: 10%  - Periprostatic fat invasion:Not identified  - Lymph-vascular invasion:  not identified  - Perineural invasion: identified  - Additional Pathologic Findings:  high-grade prostatic intraepithelial neoplasia        12/1/2021 -  Cancer Staged    Staging form: Prostate, AJCC 8th Edition  - Clinical stage from 12/1/2021: Stage IIIC (cT2a, cN0, cM0, PSA: 45.5, Grade  Group: 5) - Signed by Liss Grewal MD on 2022  Prostate specific antigen (PSA) range: 20 or greater  Sandhya primary pattern: 4  Sandhya secondary pattern: 5  Histologic grading system: 5 grade system       2022 - 11/3/2022 Radiation    The patient saw @[facility]@ for radiation treatment. This is the current list of radiation treatment:  Radiation Treatments     No radiation treatments to show. (Treatments may have been administered in another system.)               Past Medical History:   Diagnosis Date   • Cancer (HCC)     prostate  dx 2021   • Colon polyp    • Hearing aid worn     b/l   • Hypertension    • Prostate cancer (HCC)    • Seasonal allergies    • Wears glasses      Past Surgical History:   Procedure Laterality Date   • APPENDECTOMY     • COLONOSCOPY     • EGD     • [place] LAPS SURG [id number] RPBIC RAD W/NRV SPARING ROBOT N/A 2022    Procedure: PROSTATECTOMY RADICAL LAPAROSCOPIC  W ROBOTICS, BPLND, LAPROSCOPIC LYSIS OF ADHESIONS;  Surgeon: Xavier Couch MD;  Location: [facility];  Service: Urology   • PROSTATE BIOPSY     • TONSILLECTOMY       Social History   Social History     Substance and Sexual Activity   Alcohol Use Yes   • Alcohol/week: 14.0 standard drinks of alcohol   • Types: 14 Standard drinks or equivalent per week    Comment: Beer and liquor daily-average 2-3 drinks/day.      Social History     Substance and Sexual Activity   Drug Use Yes   • Types: Marijuana    Comment: marijuana   smokes  1 x daily  LD 2022     Social History     Tobacco Use   Smoking Status Former   • Current packs/day: 0.00   • Average packs/day: 0.5 packs/day for 45.0 years (22.5 ttl pk-yrs)   • Types: Pipe, Cigarettes   • Start date:    • Quit date:    • Years since quittin.4   Smokeless Tobacco Never       Meds/Allergies     Current Outpatient Medications:   •  lisinopril (ZESTRIL) 20 mg tablet, Take 20 mg by mouth daily, Disp: , Rfl:   •  vitamin B-12 (VITAMIN B-12) 1,000 mcg tablet,  Take 1,000 mcg by mouth daily Pt states takes 1000 mcg 1 time daily, Disp: , Rfl:   •  acetaminophen (TYLENOL) 325 mg tablet, Take 2 tablets (650 mg total) by mouth every 6 (six) hours as needed for mild pain (Patient not taking: Reported on 3/7/2022), Disp: 30 tablet, Rfl: 0  •  b complex vitamins capsule, Take 1 capsule by mouth daily (Patient not taking: Reported on 2022), Disp: , Rfl:   •  cholecalciferol (VITAMIN D3) 400 units tablet, Take 400 Units by mouth daily (Patient not taking: Reported on 2023), Disp: , Rfl:   •  COD LIVER OIL PO, Take by mouth (Patient not taking: Reported on 2023), Disp: , Rfl:   •  diphenhydrAMINE (BENADRYL) 25 mg tablet, Take 25 mg by mouth every 6 (six) hours as needed for itching, Disp: , Rfl:   •  docusate sodium (COLACE) 100 mg capsule, Take 1 capsule (100 mg total) by mouth 2 (two) times a day (Patient not taking: Reported on 3/7/2022), Disp: 30 capsule, Rfl: 0  •  fluticasone (FLONASE) 50 mcg/act nasal spray, 1 spray into each nostril (Patient not taking: Reported on 6/10/2024), Disp: , Rfl:   •  montelukast (SINGULAIR) 10 mg tablet, Take 1 tablet by mouth daily (Patient not taking: Reported on 2023), Disp: , Rfl:   •  Turmeric 500 MG CAPS, Take by mouth (Patient not taking: Reported on 3/7/2022), Disp: , Rfl:   Allergies   Allergen Reactions   • Seasonal Ic [Octacosanol] Sneezing     Pt states gets runny nose it just came on about 2 yrs ago it started        Review of Systems:  Refer to nursing note    OBJECTIVE:   /92   Pulse 80   Temp 98.2 °F (36.8 °C)   Wt 80.7 kg (178 lb)   SpO2 99%   BMI 27.06 kg/m²   Pain Assessment:  0  Performance Status: ECO - Asymptomatic    Physical Exam:   General Appearance:  Alert, cooperative, no distress, appears stated age  Lungs: Respirations unlabored, no cyanosis, able to speak in complete sentences without dyspnea.  TAZ: Deferred given low PSA  Extremities: No cyanosis or edema  Skin: No generalized rash  "or dermatitis  Neurologic: ANOx3, speech and cognition intact.    Portions of the record may have been created with voice recognition software.  Occasional wrong word or \"sound a like\" substitutions may have occurred due to the inherent limitations of voice recognition software.  Read the chart carefully and recognize, using context, where substitutions have occurred.  "

## 2024-06-10 NOTE — PROGRESS NOTES
Rom Landon 1950 is a 73 y.o. 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   Pathology notable for pT3bN0 disease (Amanda 5+4, +EPE, +SVI (right), +LVI, and multifocal positive margins, with 0/18 LN involved). His post-RP PSA at 2 months was 0.3 and at 5 months was 0.6. .  He was referred for adjuvant radiation therapy for persistent PSA.  He received RT to whole pelvis/prostate bed completed on 11/3/22. He was last  on 2023 and returns today for f/u visit.     PSA TREND   PSA   Latest Ref Rng 0.00 - 4.00 ng/mL   2022 0.3    2022 0.6    2022 0.1    2023 <0.1    2023 0.03    2023 0.03      2023  [facility]  Doing well.  Will alternate visits with radiation oncology every 6 months    Upcomin/10/24  Urology    Follow up visit     Oncology History   Prostate cancer (HCC)   2021 Initial Diagnosis    Prostate cancer (HCC)     2021 Biopsy    A. Prostate, right lateral base:  - Prostatic adenocarcinoma, Sandhya score 7, 4+3, Prognostic Grade Group 3, continuously involving 10% of 1 of 1 core.  - Periprostatic fat invasion:Not identified  - Lymph-vascular invasion:  not identified  - Perineural invasion: identified  - Additional Pathologic Findings: none identified     B. Prostate, right lateral mid:  - Prostatic adenocarcinoma, Sandhya score 9, 4+5, Prognostic Grade Group 5, continuously involving 100% of 1 of 1 core.  - Percentage pattern 4: 70%  - Percentage pattern 5: 5%  - Periprostatic fat invasion:Not identified  - Lymph-vascular invasion:  not identified  - Perineural invasion:Not identified  - Additional Pathologic Findings:  high-grade prostatic intraepithelial neoplasia     C: Prostate, right lateral apex:  - Prostatic adenocarcinoma, Sandhya score 9, 4+5, Prognostic Grade Group 5, continuously involving 100% of 1 of 1 core.  - Percentage pattern 4: 60%  - Percentage pattern 5: 10%  -  Periprostatic fat invasion:Not identified  - Lymph-vascular invasion:  not identified  - Perineural invasion: identified  - Additional Pathologic Findings:  high-grade prostatic intraepithelial neoplasia      D. Prostate, right base:  - Prostatic adenocarcinoma, [place] score 7, 4+3, Prognostic Grade Group 3, continuously involving 10% of 1 of 2 cores.  - Periprostatic fat invasion:Not identified  - Lymph-vascular invasion:  not identified  - Perineural invasion: identified  - Additional Pathologic Findings: high-grade prostatic intraepithelial neoplasia      E. Prostate, right mid:  - Prostatic adenocarcinoma, [place]oun score 9, 4+5, Prognostic Grade Group 5, continuously involving 100% of 1 of 1 core.  - Percentage pattern 4: 60%  - Percentage pattern 5: 10%  - Periprostatic fat invasion:Not identified  - Lymph-vascular invasion:  not identified  - Perineural invasion: identified  - Additional Pathologic Findings:  high-grade prostatic intraepithelial neoplasia       F. Prostate, right apex:  - Prostatic adenocarcinoma, Cal[place]oun score 9, 4+5, Prognostic Grade Group 5, continuously involving 100% of 1 of 1 core.  - Percentage pattern 4: 80%  - Percentage pattern 5: 10%  - Periprostatic fat invasion:Not identified  - Lymph-vascular invasion:  not identified  - Perineural invasion: identified  - Additional Pathologic Findings:  high-grade prostatic intraepithelial neoplasia       G. Prostate, left lateral base:  - Prostatic adenocarcinoma, Calhoun score 8, 4+4, Prognostic Grade Group 4, continuously involving 70% of 1 of 1 core.  - Periprostatic fat invasion:Not identified  - Lymph-vascular invasion:  not identified  - Perineural invasion:Not identified  - Additional Pathologic Findings:  high-grade prostatic intraepithelial neoplasia       H. Prostate, left lateral mid:  - Prostatic adenocarcinoma, Calhoun score 8, 4+4, Prognostic Grade Group 4, continuously involving 70% of 1 of 1 core.  - Periprostatic fat  invasion:Not identified  - Lymph-vascular invasion:  not identified  - Perineural invasion:Not identified  - Additional Pathologic Findings:  high-grade prostatic intraepithelial neoplasia        I. Prostate, left lateral apex:  - Prostatic adenocarcinoma, Sandhya score 8, 4+4, Prognostic Grade Group 4, continuously involving 100% of 1 of 1 core.  - Periprostatic fat invasion:Not identified  - Lymph-vascular invasion:  not identified  - Perineural invasion:Not identified  - Additional Pathologic Findings:  high-grade prostatic intraepithelial neoplasia       J. Prostate, left base:  - Prostatic adenocarcinoma, [place] score 8, 4+4, Prognostic Grade Group 4, continuously involving 60% of 1 of 1 core.  - Periprostatic fat invasion:Not identified  - Lymph-vascular invasion:  not identified  - Perineural invasion:Not identified  - Additional Pathologic Findings:  high-grade prostatic intraepithelial neoplasia       K. Prostate, left mid:  - Prostatic adenocarcinoma, Sandhya score 8, 4+4, Prognostic Grade Group 4, continuously involving 30% of 1 of 1 core.  - Periprostatic fat invasion:Not identified  - Lymph-vascular invasion:  not identified  - Perineural invasion:Not identified  - Additional Pathologic Findings: None identified       L. Prostate, left apex:  - Prostatic adenocarcinoma, [place] score 9, 4+5, Prognostic Grade Group 5, continuously involving 50% of 1 of 1 core.  - Percentage pattern 4: 70%  - Percentage pattern 5: 10%  - Periprostatic fat invasion:Not identified  - Lymph-vascular invasion:  not identified  - Perineural invasion: identified  - Additional Pathologic Findings:  high-grade prostatic intraepithelial neoplasia        12/1/2021 -  Cancer Staged    Staging form: Prostate, AJCC 8th Edition  - Clinical stage from 12/1/2021: Stage IIIC (cT2a, cN0, cM0, PSA: 45.5, Grade Group: 5) - Signed by Liss Grewal MD on 1/25/2022  Prostate specific antigen (PSA) range: 20 or greater  [place] primary  pattern: 4  [place] secondary pattern: 5  Histologic grading system: 5 grade system       9/8/2022 - 11/3/2022 Radiation    The patient saw [unfilled] for radiation treatment. This is the current list of radiation treatment:  Radiation Treatments       No radiation treatments to show. (Treatments may have been administered in another system.)                   Review of Systems:  Review of Systems   Constitutional: Negative.    HENT:  Positive for hearing loss and rhinorrhea.    Eyes: Negative.         Floater in right eye   Respiratory:  Positive for shortness of breath (with activity).    Cardiovascular: Negative.    Gastrointestinal: Negative.    Endocrine: Negative.    Genitourinary:  Negative for dysuria, hematuria and urgency.        Nocturia 2-3x   Musculoskeletal:  Positive for gait problem.   Skin: Negative.    Allergic/Immunologic: Positive for environmental allergies.   Hematological: Negative.    Psychiatric/Behavioral:  Positive for sleep disturbance.        Clinical Trial: no    IPSS Questionnaire (AUA-7):  Over the past month…    1)  How often have you had a sensation of not emptying your bladder completely after you finish urinating?  0 - Not at all   2)  How often have you had to urinate again less than two hours after you finished urinating? 3 - About half the time   3)  How often have you found you stopped and started again several times when you urinated?  0 - Not at all   4) How difficult have you found it to postpone urination?  0 - Not at all   5) How often have you had a weak urinary stream?  0 - Not at all   6) How often have you had to push or strain to begin urination?  0 - Not at all   7) How many times did you most typically get up to urinate from the time you went to bed until the time you got up in the morning?  2 - 2 times   Total Score:  5       Teaching completed    Health Maintenance   Topic Date Due    Hepatitis C Screening  Never done    Pneumococcal Vaccine: 65+ Years (1 of 2 -  PCV) Never done    BMI: Followup Plan  Never done    Annual Physical  Never done    Zoster Vaccine (1 of 2) Never done    DTaP,Tdap,and Td Vaccines (1 - Tdap) Never done    Colorectal Cancer Screening  Never done    Lung Cancer Screening  Never done    RSV Vaccine Age 60+ Years (1 - 1-dose 60+ series) Never done    Fall Risk  Never done    COVID-19 Vaccine (5 - 2023-24 season) 09/01/2023    Depression Screening  06/13/2024    BMI: Adult  12/05/2024    Influenza Vaccine  Completed    RSV Vaccine age 0-20 Months  Aged Out    HIB Vaccine  Aged Out    IPV Vaccine  Aged Out    Hepatitis A Vaccine  Aged Out    Meningococcal ACWY Vaccine  Aged Out    HPV Vaccine  Aged Out     Patient Active Problem List   Diagnosis    Prostate cancer (HCC)    Hypertensive kidney disease with stage 3a chronic kidney disease (HCC)    Essential hypertension     Past Medical History:   Diagnosis Date    Cancer (HCC)     prostate  dx 12/2021    Colon polyp     Hearing aid worn     b/l    Hypertension     Prostate cancer (HCC)     Seasonal allergies     Wears glasses      Past Surgical History:   Procedure Laterality Date    APPENDECTOMY      COLONOSCOPY      EGD      CO LAPS SURG [id number] RPBIC RAD W/NRV SPARING ROBOT N/A 2/17/2022    Procedure: PROSTATECTOMY RADICAL LAPAROSCOPIC  W ROBOTICS, BPLND, LAPROSCOPIC LYSIS OF ADHESIONS;  Surgeon: Xavier Couch MD;  Location: [facility];  Service: Urology    PROSTATE BIOPSY      TONSILLECTOMY       Family History   Problem Relation Age of Onset    Cancer Father     Cancer Mother      Social History     Socioeconomic History    Marital status: /Civil Union     Spouse name: Not on file    Number of children: Not on file    Years of education: Not on file    Highest education level: Not on file   Occupational History    Not on file   Tobacco Use    Smoking status: Former     Current packs/day: 0.00     Average packs/day: 0.5 packs/day for 45.0 years (22.5 ttl pk-yrs)     Types: Pipe,  Cigarettes     Start date:      Quit date: 2013     Years since quittin.4    Smokeless tobacco: Never   Vaping Use    Vaping status: Never Used   Substance and Sexual Activity    Alcohol use: Yes     Alcohol/week: 14.0 standard drinks of alcohol     Types: 14 Standard drinks or equivalent per week     Comment: Beer and liquor daily-average 2-3 drinks/day.     Drug use: Yes     Types: Marijuana     Comment: marijuana   smokes  1 x daily  LD 2022    Sexual activity: Not Currently   Other Topics Concern    Not on file   Social History Narrative    Not on file     Social Determinants of Health     Financial Resource Strain: Not on file   Food Insecurity: Not on file   Transportation Needs: Not on file   Physical Activity: Not on file   Stress: Not on file   Social Connections: Not on file   Intimate Partner Violence: Not on file   Housing Stability: Not on file       Current Outpatient Medications:     acetaminophen (TYLENOL) 325 mg tablet, Take 2 tablets (650 mg total) by mouth every 6 (six) hours as needed for mild pain (Patient not taking: Reported on 3/7/2022), Disp: 30 tablet, Rfl: 0    b complex vitamins capsule, Take 1 capsule by mouth daily (Patient not taking: Reported on 2022), Disp: , Rfl:     cholecalciferol (VITAMIN D3) 400 units tablet, Take 400 Units by mouth daily (Patient not taking: Reported on 2023), Disp: , Rfl:     COD LIVER OIL PO, Take by mouth (Patient not taking: Reported on 2023), Disp: , Rfl:     diphenhydrAMINE (BENADRYL) 25 mg tablet, Take 25 mg by mouth every 6 (six) hours as needed for itching, Disp: , Rfl:     docusate sodium (COLACE) 100 mg capsule, Take 1 capsule (100 mg total) by mouth 2 (two) times a day (Patient not taking: Reported on 3/7/2022), Disp: 30 capsule, Rfl: 0    fluticasone (FLONASE) 50 mcg/act nasal spray, 1 spray into each nostril, Disp: , Rfl:     lisinopril (ZESTRIL) 20 mg tablet, Take 20 mg by mouth daily, Disp: , Rfl:     montelukast  (SINGULAIR) 10 mg tablet, Take 1 tablet by mouth daily (Patient not taking: Reported on 12/5/2023), Disp: , Rfl:     Turmeric 500 MG CAPS, Take by mouth (Patient not taking: Reported on 3/7/2022), Disp: , Rfl:     vitamin B-12 (VITAMIN B-12) 1,000 mcg tablet, Take 1,000 mcg by mouth daily Pt states takes 1000 mcg 1 time daily, Disp: , Rfl:   Allergies   Allergen Reactions    Seasonal Ic [Octacosanol] Sneezing     Pt states gets runny nose it just came on about 2 yrs ago it started      There were no vitals filed for this visit.

## 2024-06-13 ENCOUNTER — APPOINTMENT (OUTPATIENT)
Dept: LAB | Facility: CLINIC | Age: 74
End: 2024-06-13
Payer: COMMERCIAL

## 2024-06-13 DIAGNOSIS — C61 PROSTATE CANCER (HCC): ICD-10-CM

## 2024-06-13 LAB — PSA SERPL-MCNC: 0.11 NG/ML (ref 0–4)

## 2024-06-13 PROCEDURE — 36415 COLL VENOUS BLD VENIPUNCTURE: CPT

## 2024-06-13 PROCEDURE — 84153 ASSAY OF PSA TOTAL: CPT

## 2024-12-11 ENCOUNTER — TELEPHONE (OUTPATIENT)
Dept: UROLOGY | Facility: CLINIC | Age: 74
End: 2024-12-11

## 2024-12-11 NOTE — TELEPHONE ENCOUNTER
[facility] to get patient scheduled for his yearly psa check.  I called patient to get him scheduled for appointment.  Patient refused to make an appointment, stated that there is no reason for him to come down to see us that is [facility] .

## (undated) DEVICE — TROCAR PORT ACCESS 12 X120MM W/BLDLS OPTICAL TIP AIRSEAL

## (undated) DEVICE — GLOVE INDICATOR PI UNDERGLOVE SZ 8 BLUE

## (undated) DEVICE — LUBRICANT INST ELECTROLUBE ANTISTK WO PAD

## (undated) DEVICE — ADHESIVE SKIN HIGH VISCOSITY EXOFIN 1ML

## (undated) DEVICE — SUT ETHILON 3-0 FS-1 18 IN 663G

## (undated) DEVICE — JP PERF DRN SIL FLT 10MM FULL: Brand: CARDINAL HEALTH

## (undated) DEVICE — COLUMN DRAPE

## (undated) DEVICE — AIRSEAL TUBE SMOKE EVAC LUMENX3 FILTERED

## (undated) DEVICE — TISSUE RETRIEVAL SYSTEM: Brand: INZII RETRIEVAL SYSTEM

## (undated) DEVICE — STERILE SURGICAL LUBRICANT,  TUBE: Brand: SURGILUBE

## (undated) DEVICE — SUT VLOC 90 3-0 90 CV-23 L9 IN VLOCM1944

## (undated) DEVICE — SURGICEL 4 X 8

## (undated) DEVICE — ASTOUND STANDARD SURGICAL GOWN, XL: Brand: CONVERTORS

## (undated) DEVICE — URIMETER 2500ML

## (undated) DEVICE — CATH FOLEY COUNCIL 20FR 5ML 2 WAY LUBRICATH

## (undated) DEVICE — 3000CC GUARDIAN II: Brand: GUARDIAN

## (undated) DEVICE — GLOVE SRG BIOGEL ECLIPSE 7.5

## (undated) DEVICE — SUT VICRYL 0 UR-6 27 IN J603H

## (undated) DEVICE — JACKSON-PRATT 100CC BULB RESERVOIR: Brand: CARDINAL HEALTH

## (undated) DEVICE — LARGE NEEDLE DRIVER: Brand: ENDOWRIST

## (undated) DEVICE — INTENDED FOR TISSUE SEPARATION, AND OTHER PROCEDURES THAT REQUIRE A SHARP SURGICAL BLADE TO PUNCTURE OR CUT.: Brand: BARD-PARKER SAFETY BLADES SIZE 11, STERILE

## (undated) DEVICE — CHLORAPREP HI-LITE 26ML ORANGE

## (undated) DEVICE — INTENDED FOR TISSUE SEPARATION, AND OTHER PROCEDURES THAT REQUIRE A SHARP SURGICAL BLADE TO PUNCTURE OR CUT.: Brand: BARD-PARKER SAFETY BLADES SIZE 15, STERILE

## (undated) DEVICE — ARM DRAPE

## (undated) DEVICE — DRAPE EQUIPMENT RF WAND

## (undated) DEVICE — CATH FOLEY 18FR 5ML 2 WAY SILICONE ELASTIMER

## (undated) DEVICE — SUT VLOC 90 3-0 CV-23 9 IN VLOCM0844

## (undated) DEVICE — DRAIN SPONGES,6 PLY: Brand: EXCILON

## (undated) DEVICE — FENESTRATED BIPOLAR FORCEPS: Brand: ENDOWRIST

## (undated) DEVICE — SUT PDS II 0 CT-1 36 IN Z346H

## (undated) DEVICE — KIT, BETHLEHEM ROBOTIC PROST: Brand: CARDINAL HEALTH

## (undated) DEVICE — ENDOPATH PNEUMONEEDLE INSUFFLATION NEEDLES WITH LUER LOCK CONNECTORS 120MM: Brand: ENDOPATH

## (undated) DEVICE — TIP COVER ACCESSORY

## (undated) DEVICE — ENDOPATH XCEL UNIVERSAL TROCAR STABLILITY SLEEVES: Brand: ENDOPATH XCEL

## (undated) DEVICE — CLAMP TOWEL TUBING DISPOSABLE

## (undated) DEVICE — STRL COTTON TIP APPLCTR 6IN PK: Brand: CARDINAL HEALTH

## (undated) DEVICE — SUT MONOCRYL 4-0 PS-2 27 IN Y426H

## (undated) DEVICE — ANTIBACTERIAL UNDYED BRAIDED (POLYGLACTIN 910), SYNTHETIC ABSORBABLE SUTURE: Brand: COATED VICRYL

## (undated) DEVICE — UNDYED BRAIDED (POLYGLACTIN 910), SYNTHETIC ABSORBABLE SUTURE: Brand: COATED VICRYL